# Patient Record
Sex: FEMALE | Employment: OTHER
[De-identification: names, ages, dates, MRNs, and addresses within clinical notes are randomized per-mention and may not be internally consistent; named-entity substitution may affect disease eponyms.]

---

## 2017-01-01 ENCOUNTER — HOME CARE VISIT (OUTPATIENT)
Dept: SCHEDULING | Facility: HOME HEALTH | Age: 77
End: 2017-01-01
Payer: MEDICARE

## 2017-01-01 ENCOUNTER — HOSPITAL ENCOUNTER (EMERGENCY)
Age: 77
Discharge: HOME | DRG: 584 | End: 2017-01-16
Attending: EMERGENCY MEDICINE
Payer: MEDICARE

## 2017-01-01 ENCOUNTER — APPOINTMENT (OUTPATIENT)
Dept: CT IMAGING | Age: 77
DRG: 584 | End: 2017-01-01
Attending: EMERGENCY MEDICINE
Payer: MEDICARE

## 2017-01-01 ENCOUNTER — HOME CARE VISIT (OUTPATIENT)
Dept: HOSPICE | Facility: HOSPICE | Age: 77
End: 2017-01-01
Payer: MEDICARE

## 2017-01-01 ENCOUNTER — APPOINTMENT (OUTPATIENT)
Dept: CT IMAGING | Age: 77
DRG: 584 | End: 2017-01-01
Attending: INTERNAL MEDICINE
Payer: MEDICARE

## 2017-01-01 ENCOUNTER — APPOINTMENT (OUTPATIENT)
Dept: CT IMAGING | Age: 77
DRG: 584 | End: 2017-01-01
Attending: ORTHOPAEDIC SURGERY
Payer: MEDICARE

## 2017-01-01 ENCOUNTER — HOSPICE ADMISSION (OUTPATIENT)
Dept: HOSPICE | Facility: HOSPICE | Age: 77
End: 2017-01-01
Payer: MEDICARE

## 2017-01-01 ENCOUNTER — HOSPITAL ENCOUNTER (INPATIENT)
Age: 77
LOS: 15 days | Discharge: HOSPICE | DRG: 584 | End: 2017-02-01
Attending: EMERGENCY MEDICINE | Admitting: INTERNAL MEDICINE
Payer: MEDICARE

## 2017-01-01 ENCOUNTER — APPOINTMENT (OUTPATIENT)
Dept: NUCLEAR MEDICINE | Age: 77
DRG: 584 | End: 2017-01-01
Attending: ORTHOPAEDIC SURGERY
Payer: MEDICARE

## 2017-01-01 ENCOUNTER — HOSPITAL ENCOUNTER (OUTPATIENT)
Dept: LAB | Age: 77
Discharge: HOME | End: 2017-02-02
Payer: MEDICARE

## 2017-01-01 ENCOUNTER — APPOINTMENT (OUTPATIENT)
Dept: GENERAL RADIOLOGY | Age: 77
DRG: 584 | End: 2017-01-01
Attending: FAMILY MEDICINE
Payer: MEDICARE

## 2017-01-01 ENCOUNTER — APPOINTMENT (OUTPATIENT)
Dept: GENERAL RADIOLOGY | Age: 77
DRG: 871 | End: 2017-01-01
Attending: EMERGENCY MEDICINE
Payer: MEDICARE

## 2017-01-01 ENCOUNTER — HOSPITAL ENCOUNTER (INPATIENT)
Age: 77
LOS: 1 days | DRG: 871 | End: 2017-05-29
Attending: EMERGENCY MEDICINE | Admitting: INTERNAL MEDICINE
Payer: MEDICARE

## 2017-01-01 ENCOUNTER — APPOINTMENT (OUTPATIENT)
Dept: MRI IMAGING | Age: 77
DRG: 584 | End: 2017-01-01
Attending: EMERGENCY MEDICINE
Payer: MEDICARE

## 2017-01-01 ENCOUNTER — HOSPITAL ENCOUNTER (OUTPATIENT)
Dept: LAB | Age: 77
Discharge: HOME | End: 2017-02-02

## 2017-01-01 VITALS
DIASTOLIC BLOOD PRESSURE: 81 MMHG | RESPIRATION RATE: 21 BRPM | TEMPERATURE: 99 F | HEART RATE: 103 BPM | OXYGEN SATURATION: 86 % | SYSTOLIC BLOOD PRESSURE: 148 MMHG

## 2017-01-01 VITALS
OXYGEN SATURATION: 94 % | DIASTOLIC BLOOD PRESSURE: 74 MMHG | RESPIRATION RATE: 27 BRPM | SYSTOLIC BLOOD PRESSURE: 142 MMHG | TEMPERATURE: 98.9 F | HEART RATE: 87 BPM

## 2017-01-01 VITALS
SYSTOLIC BLOOD PRESSURE: 131 MMHG | HEART RATE: 95 BPM | DIASTOLIC BLOOD PRESSURE: 81 MMHG | OXYGEN SATURATION: 96 % | TEMPERATURE: 98.1 F | RESPIRATION RATE: 18 BRPM

## 2017-01-01 VITALS
DIASTOLIC BLOOD PRESSURE: 80 MMHG | OXYGEN SATURATION: 97 % | RESPIRATION RATE: 20 BRPM | SYSTOLIC BLOOD PRESSURE: 145 MMHG | HEART RATE: 96 BPM | TEMPERATURE: 98.1 F

## 2017-01-01 VITALS
OXYGEN SATURATION: 94 % | BODY MASS INDEX: 30.23 KG/M2 | HEIGHT: 63 IN | RESPIRATION RATE: 16 BRPM | SYSTOLIC BLOOD PRESSURE: 90 MMHG | TEMPERATURE: 101.4 F | WEIGHT: 170.64 LBS | DIASTOLIC BLOOD PRESSURE: 42 MMHG | HEART RATE: 120 BPM

## 2017-01-01 VITALS
DIASTOLIC BLOOD PRESSURE: 92 MMHG | OXYGEN SATURATION: 97 % | RESPIRATION RATE: 20 BRPM | HEART RATE: 99 BPM | SYSTOLIC BLOOD PRESSURE: 134 MMHG | TEMPERATURE: 97.3 F

## 2017-01-01 VITALS
SYSTOLIC BLOOD PRESSURE: 118 MMHG | DIASTOLIC BLOOD PRESSURE: 70 MMHG | TEMPERATURE: 98.6 F | RESPIRATION RATE: 18 BRPM | OXYGEN SATURATION: 95 % | HEART RATE: 75 BPM

## 2017-01-01 VITALS
OXYGEN SATURATION: 96 % | DIASTOLIC BLOOD PRESSURE: 84 MMHG | HEART RATE: 98 BPM | RESPIRATION RATE: 28 BRPM | SYSTOLIC BLOOD PRESSURE: 115 MMHG | TEMPERATURE: 97 F

## 2017-01-01 VITALS
TEMPERATURE: 96.1 F | RESPIRATION RATE: 24 BRPM | HEART RATE: 82 BPM | DIASTOLIC BLOOD PRESSURE: 60 MMHG | SYSTOLIC BLOOD PRESSURE: 121 MMHG | OXYGEN SATURATION: 96 %

## 2017-01-01 VITALS
RESPIRATION RATE: 22 BRPM | OXYGEN SATURATION: 96 % | TEMPERATURE: 98.8 F | DIASTOLIC BLOOD PRESSURE: 83 MMHG | HEART RATE: 93 BPM | SYSTOLIC BLOOD PRESSURE: 146 MMHG

## 2017-01-01 VITALS
HEART RATE: 99 BPM | OXYGEN SATURATION: 94 % | TEMPERATURE: 99.3 F | DIASTOLIC BLOOD PRESSURE: 76 MMHG | RESPIRATION RATE: 27 BRPM | SYSTOLIC BLOOD PRESSURE: 146 MMHG

## 2017-01-01 VITALS
RESPIRATION RATE: 20 BRPM | DIASTOLIC BLOOD PRESSURE: 86 MMHG | OXYGEN SATURATION: 98 % | TEMPERATURE: 98 F | SYSTOLIC BLOOD PRESSURE: 139 MMHG | HEART RATE: 88 BPM

## 2017-01-01 VITALS
HEART RATE: 86 BPM | OXYGEN SATURATION: 96 % | DIASTOLIC BLOOD PRESSURE: 85 MMHG | TEMPERATURE: 98.2 F | SYSTOLIC BLOOD PRESSURE: 143 MMHG | RESPIRATION RATE: 20 BRPM

## 2017-01-01 VITALS
DIASTOLIC BLOOD PRESSURE: 83 MMHG | OXYGEN SATURATION: 97 % | TEMPERATURE: 97.8 F | SYSTOLIC BLOOD PRESSURE: 132 MMHG | HEART RATE: 77 BPM | RESPIRATION RATE: 20 BRPM

## 2017-01-01 VITALS
OXYGEN SATURATION: 97 % | TEMPERATURE: 98.3 F | RESPIRATION RATE: 21 BRPM | SYSTOLIC BLOOD PRESSURE: 129 MMHG | HEART RATE: 87 BPM | DIASTOLIC BLOOD PRESSURE: 74 MMHG

## 2017-01-01 VITALS
TEMPERATURE: 98.2 F | OXYGEN SATURATION: 97 % | HEART RATE: 100 BPM | DIASTOLIC BLOOD PRESSURE: 60 MMHG | SYSTOLIC BLOOD PRESSURE: 148 MMHG | RESPIRATION RATE: 24 BRPM

## 2017-01-01 VITALS
TEMPERATURE: 98.3 F | HEART RATE: 90 BPM | OXYGEN SATURATION: 98 % | RESPIRATION RATE: 20 BRPM | DIASTOLIC BLOOD PRESSURE: 77 MMHG | SYSTOLIC BLOOD PRESSURE: 140 MMHG

## 2017-01-01 VITALS
BODY MASS INDEX: 34.15 KG/M2 | SYSTOLIC BLOOD PRESSURE: 137 MMHG | TEMPERATURE: 98.1 F | WEIGHT: 200 LBS | HEART RATE: 102 BPM | RESPIRATION RATE: 20 BRPM | OXYGEN SATURATION: 96 % | DIASTOLIC BLOOD PRESSURE: 69 MMHG | HEIGHT: 64 IN

## 2017-01-01 VITALS
RESPIRATION RATE: 20 BRPM | TEMPERATURE: 98.3 F | DIASTOLIC BLOOD PRESSURE: 72 MMHG | SYSTOLIC BLOOD PRESSURE: 124 MMHG | OXYGEN SATURATION: 97 % | HEART RATE: 89 BPM

## 2017-01-01 VITALS
OXYGEN SATURATION: 97 % | HEART RATE: 93 BPM | SYSTOLIC BLOOD PRESSURE: 143 MMHG | TEMPERATURE: 98.2 F | DIASTOLIC BLOOD PRESSURE: 81 MMHG | RESPIRATION RATE: 23 BRPM

## 2017-01-01 VITALS
RESPIRATION RATE: 27 BRPM | DIASTOLIC BLOOD PRESSURE: 68 MMHG | TEMPERATURE: 98.6 F | HEART RATE: 97 BPM | OXYGEN SATURATION: 96 % | SYSTOLIC BLOOD PRESSURE: 140 MMHG

## 2017-01-01 VITALS
TEMPERATURE: 98 F | BODY MASS INDEX: 33.95 KG/M2 | HEIGHT: 63 IN | SYSTOLIC BLOOD PRESSURE: 146 MMHG | DIASTOLIC BLOOD PRESSURE: 67 MMHG | WEIGHT: 191.58 LBS | OXYGEN SATURATION: 97 % | HEART RATE: 82 BPM | RESPIRATION RATE: 16 BRPM

## 2017-01-01 VITALS
TEMPERATURE: 98.3 F | DIASTOLIC BLOOD PRESSURE: 78 MMHG | OXYGEN SATURATION: 96 % | HEART RATE: 90 BPM | RESPIRATION RATE: 23 BRPM | SYSTOLIC BLOOD PRESSURE: 125 MMHG

## 2017-01-01 VITALS
TEMPERATURE: 98.6 F | RESPIRATION RATE: 26 BRPM | HEART RATE: 89 BPM | SYSTOLIC BLOOD PRESSURE: 122 MMHG | OXYGEN SATURATION: 96 % | DIASTOLIC BLOOD PRESSURE: 78 MMHG

## 2017-01-01 VITALS
SYSTOLIC BLOOD PRESSURE: 143 MMHG | RESPIRATION RATE: 24 BRPM | HEART RATE: 98 BPM | OXYGEN SATURATION: 97 % | DIASTOLIC BLOOD PRESSURE: 87 MMHG | TEMPERATURE: 98.5 F

## 2017-01-01 VITALS
TEMPERATURE: 98.1 F | SYSTOLIC BLOOD PRESSURE: 132 MMHG | OXYGEN SATURATION: 98 % | DIASTOLIC BLOOD PRESSURE: 84 MMHG | RESPIRATION RATE: 20 BRPM | HEART RATE: 94 BPM

## 2017-01-01 VITALS
TEMPERATURE: 96.6 F | OXYGEN SATURATION: 96 % | RESPIRATION RATE: 20 BRPM | DIASTOLIC BLOOD PRESSURE: 83 MMHG | SYSTOLIC BLOOD PRESSURE: 153 MMHG | HEART RATE: 91 BPM

## 2017-01-01 VITALS
HEART RATE: 108 BPM | RESPIRATION RATE: 26 BRPM | SYSTOLIC BLOOD PRESSURE: 143 MMHG | DIASTOLIC BLOOD PRESSURE: 62 MMHG | TEMPERATURE: 98.5 F

## 2017-01-01 VITALS
DIASTOLIC BLOOD PRESSURE: 89 MMHG | TEMPERATURE: 98.8 F | OXYGEN SATURATION: 96 % | RESPIRATION RATE: 26 BRPM | HEART RATE: 83 BPM | SYSTOLIC BLOOD PRESSURE: 139 MMHG

## 2017-01-01 VITALS
DIASTOLIC BLOOD PRESSURE: 77 MMHG | SYSTOLIC BLOOD PRESSURE: 138 MMHG | OXYGEN SATURATION: 96 % | HEART RATE: 91 BPM | TEMPERATURE: 98.7 F | RESPIRATION RATE: 27 BRPM

## 2017-01-01 VITALS
SYSTOLIC BLOOD PRESSURE: 139 MMHG | DIASTOLIC BLOOD PRESSURE: 71 MMHG | OXYGEN SATURATION: 96 % | HEART RATE: 89 BPM | TEMPERATURE: 98.7 F | RESPIRATION RATE: 23 BRPM

## 2017-01-01 VITALS
RESPIRATION RATE: 23 BRPM | OXYGEN SATURATION: 97 % | DIASTOLIC BLOOD PRESSURE: 72 MMHG | TEMPERATURE: 98.5 F | SYSTOLIC BLOOD PRESSURE: 124 MMHG | HEART RATE: 103 BPM

## 2017-01-01 VITALS
OXYGEN SATURATION: 97 % | HEART RATE: 97 BPM | TEMPERATURE: 97.4 F | SYSTOLIC BLOOD PRESSURE: 150 MMHG | RESPIRATION RATE: 26 BRPM | DIASTOLIC BLOOD PRESSURE: 86 MMHG

## 2017-01-01 VITALS
RESPIRATION RATE: 23 BRPM | OXYGEN SATURATION: 97 % | TEMPERATURE: 97.9 F | HEART RATE: 79 BPM | DIASTOLIC BLOOD PRESSURE: 80 MMHG | SYSTOLIC BLOOD PRESSURE: 153 MMHG

## 2017-01-01 DIAGNOSIS — S16.1XXA NECK STRAIN, INITIAL ENCOUNTER: Primary | ICD-10-CM

## 2017-01-01 DIAGNOSIS — I26.99 OTHER ACUTE PULMONARY EMBOLISM WITHOUT ACUTE COR PULMONALE (HCC): ICD-10-CM

## 2017-01-01 DIAGNOSIS — R51.9 HEADACHE, UNSPECIFIED HEADACHE TYPE: ICD-10-CM

## 2017-01-01 DIAGNOSIS — R53.81 DEBILITY: ICD-10-CM

## 2017-01-01 DIAGNOSIS — E86.0 DEHYDRATION: ICD-10-CM

## 2017-01-01 DIAGNOSIS — C79.51 BONE METASTASIS (HCC): ICD-10-CM

## 2017-01-01 DIAGNOSIS — C50.412 MALIGNANT NEOPLASM OF UPPER-OUTER QUADRANT OF LEFT FEMALE BREAST (HCC): ICD-10-CM

## 2017-01-01 DIAGNOSIS — G95.29 SPINAL CORD COMPRESSION DUE TO MALIGNANT NEOPLASM METASTATIC TO SPINE (HCC): ICD-10-CM

## 2017-01-01 DIAGNOSIS — R22.1 NECK MASS: ICD-10-CM

## 2017-01-01 DIAGNOSIS — C78.00 BREAST CANCER METASTASIZED TO LUNG, RIGHT (HCC): ICD-10-CM

## 2017-01-01 DIAGNOSIS — C79.51 SPINAL CORD COMPRESSION DUE TO MALIGNANT NEOPLASM METASTATIC TO SPINE (HCC): ICD-10-CM

## 2017-01-01 DIAGNOSIS — A41.9 SEPSIS AFFECTING SKIN: Primary | ICD-10-CM

## 2017-01-01 DIAGNOSIS — M54.2 NECK PAIN: Primary | ICD-10-CM

## 2017-01-01 DIAGNOSIS — C50.911 BREAST CANCER METASTASIZED TO LUNG, RIGHT (HCC): ICD-10-CM

## 2017-01-01 LAB
ABO + RH BLD: NORMAL
ABO + RH BLD: NORMAL
ALBUMIN SERPL BCP-MCNC: 1.9 G/DL (ref 3.4–5)
ALBUMIN SERPL BCP-MCNC: 1.9 G/DL (ref 3.4–5)
ALBUMIN SERPL BCP-MCNC: 2.1 G/DL (ref 3.4–5)
ALBUMIN SERPL BCP-MCNC: 2.9 G/DL (ref 3.4–5)
ALBUMIN/GLOB SERPL: 0.4 {RATIO} (ref 0.8–1.7)
ALBUMIN/GLOB SERPL: 0.5 {RATIO} (ref 0.8–1.7)
ALP SERPL-CCNC: 173 U/L (ref 45–117)
ALP SERPL-CCNC: 47 U/L (ref 45–117)
ALP SERPL-CCNC: 53 U/L (ref 45–117)
ALP SERPL-CCNC: 66 U/L (ref 45–117)
ALT SERPL-CCNC: 22 U/L (ref 13–56)
ALT SERPL-CCNC: 36 U/L (ref 13–56)
ALT SERPL-CCNC: 38 U/L (ref 13–56)
ALT SERPL-CCNC: 73 U/L (ref 13–56)
ANION GAP BLD CALC-SCNC: 5 MMOL/L (ref 3–18)
ANION GAP BLD CALC-SCNC: 6 MMOL/L (ref 3–18)
ANION GAP BLD CALC-SCNC: 7 MMOL/L (ref 3–18)
ANION GAP BLD CALC-SCNC: 8 MMOL/L (ref 3–18)
ANION GAP BLD CALC-SCNC: 9 MMOL/L (ref 3–18)
APPEARANCE UR: ABNORMAL
APPEARANCE UR: CLEAR
APTT PPP: 110.6 SEC (ref 23–36.4)
APTT PPP: 150.4 SEC (ref 23–36.4)
APTT PPP: 25.3 SEC (ref 23–36.4)
APTT PPP: 26.2 SEC (ref 23–36.4)
APTT PPP: 26.4 SEC (ref 23–36.4)
APTT PPP: 26.4 SEC (ref 23–36.4)
APTT PPP: 26.6 SEC (ref 23–36.4)
APTT PPP: 31.1 SEC (ref 23–36.4)
APTT PPP: 32.3 SEC (ref 23–36.4)
APTT PPP: 35.9 SEC (ref 23–36.4)
APTT PPP: 42 SEC (ref 23–36.4)
APTT PPP: 48.1 SEC (ref 23–36.4)
APTT PPP: 48.8 SEC (ref 23–36.4)
APTT PPP: 72.2 SEC (ref 23–36.4)
APTT PPP: 73.9 SEC (ref 23–36.4)
APTT PPP: 74.5 SEC (ref 23–36.4)
APTT PPP: 75 SEC (ref 23–36.4)
APTT PPP: 76.2 SEC (ref 23–36.4)
APTT PPP: 95.2 SEC (ref 23–36.4)
APTT PPP: >180 SEC (ref 23–36.4)
ARTERIAL PATENCY WRIST A: ABNORMAL
ARTERIAL PATENCY WRIST A: YES
AST SERPL W P-5'-P-CCNC: 108 U/L (ref 15–37)
AST SERPL W P-5'-P-CCNC: 35 U/L (ref 15–37)
AST SERPL W P-5'-P-CCNC: 45 U/L (ref 15–37)
AST SERPL W P-5'-P-CCNC: 58 U/L (ref 15–37)
ATRIAL RATE: 103 BPM
ATRIAL RATE: 113 BPM
ATRIAL RATE: 131 BPM
BACTERIA SPEC CULT: NORMAL
BACTERIA URNS QL MICRO: ABNORMAL /HPF
BACTERIA URNS QL MICRO: ABNORMAL /HPF
BASE EXCESS BLD CALC-SCNC: 11 MMOL/L
BASE EXCESS BLD CALC-SCNC: 5 MMOL/L
BASOPHILS # BLD AUTO: 0 K/UL (ref 0–0.06)
BASOPHILS # BLD AUTO: 0 K/UL (ref 0–0.1)
BASOPHILS # BLD: 0 % (ref 0–2)
BASOPHILS # BLD: 0 % (ref 0–3)
BASOPHILS # BLD: 0 % (ref 0–3)
BDY SITE: ABNORMAL
BDY SITE: ABNORMAL
BILIRUB DIRECT SERPL-MCNC: <0.1 MG/DL (ref 0–0.2)
BILIRUB SERPL-MCNC: 0.1 MG/DL (ref 0.2–1)
BILIRUB SERPL-MCNC: 0.5 MG/DL (ref 0.2–1)
BILIRUB SERPL-MCNC: 0.6 MG/DL (ref 0.2–1)
BILIRUB SERPL-MCNC: 1.2 MG/DL (ref 0.2–1)
BILIRUB UR QL: ABNORMAL
BILIRUB UR QL: NEGATIVE
BLD PROD TYP BPU: NORMAL
BLOOD GROUP ANTIBODIES SERPL: NORMAL
BLOOD GROUP ANTIBODIES SERPL: NORMAL
BODY TEMPERATURE: 37
BPU ID: NORMAL
BUN SERPL-MCNC: 13 MG/DL (ref 7–18)
BUN SERPL-MCNC: 16 MG/DL (ref 7–18)
BUN SERPL-MCNC: 16 MG/DL (ref 7–18)
BUN SERPL-MCNC: 20 MG/DL (ref 7–18)
BUN SERPL-MCNC: 21 MG/DL (ref 7–18)
BUN SERPL-MCNC: 21 MG/DL (ref 7–18)
BUN SERPL-MCNC: 22 MG/DL (ref 7–18)
BUN SERPL-MCNC: 23 MG/DL (ref 7–18)
BUN SERPL-MCNC: 24 MG/DL (ref 7–18)
BUN SERPL-MCNC: 26 MG/DL (ref 7–18)
BUN SERPL-MCNC: 71 MG/DL (ref 7–18)
BUN/CREAT SERPL: 19 (ref 12–20)
BUN/CREAT SERPL: 20 (ref 12–20)
BUN/CREAT SERPL: 24 (ref 12–20)
BUN/CREAT SERPL: 26 (ref 12–20)
BUN/CREAT SERPL: 26 (ref 12–20)
BUN/CREAT SERPL: 27 (ref 12–20)
BUN/CREAT SERPL: 28 (ref 12–20)
BUN/CREAT SERPL: 31 (ref 12–20)
BUN/CREAT SERPL: 32 (ref 12–20)
BUN/CREAT SERPL: 32 (ref 12–20)
BUN/CREAT SERPL: 49 (ref 12–20)
CALCIUM SERPL-MCNC: 10.4 MG/DL (ref 8.5–10.1)
CALCIUM SERPL-MCNC: 10.4 MG/DL (ref 8.5–10.1)
CALCIUM SERPL-MCNC: 8.6 MG/DL (ref 8.5–10.1)
CALCIUM SERPL-MCNC: 8.8 MG/DL (ref 8.5–10.1)
CALCIUM SERPL-MCNC: 8.9 MG/DL (ref 8.5–10.1)
CALCIUM SERPL-MCNC: 9 MG/DL (ref 8.5–10.1)
CALCIUM SERPL-MCNC: 9 MG/DL (ref 8.5–10.1)
CALCIUM SERPL-MCNC: 9.2 MG/DL (ref 8.5–10.1)
CALCIUM SERPL-MCNC: 9.3 MG/DL (ref 8.5–10.1)
CALCIUM SERPL-MCNC: 9.4 MG/DL (ref 8.5–10.1)
CALCIUM SERPL-MCNC: 9.5 MG/DL (ref 8.5–10.1)
CALCULATED P AXIS, ECG09: 28 DEGREES
CALCULATED P AXIS, ECG09: 50 DEGREES
CALCULATED P AXIS, ECG09: 61 DEGREES
CALCULATED R AXIS, ECG10: -27 DEGREES
CALCULATED R AXIS, ECG10: -41 DEGREES
CALCULATED R AXIS, ECG10: -42 DEGREES
CALCULATED T AXIS, ECG11: 115 DEGREES
CALCULATED T AXIS, ECG11: 118 DEGREES
CALCULATED T AXIS, ECG11: 138 DEGREES
CALLED TO:,BCALL1: NORMAL
CANCER AG27-29 SERPL-ACNC: 49.4 U/ML (ref 0–38.6)
CEA SERPL-MCNC: 19.5 NG/ML
CHLORIDE SERPL-SCNC: 100 MMOL/L (ref 100–108)
CHLORIDE SERPL-SCNC: 100 MMOL/L (ref 100–108)
CHLORIDE SERPL-SCNC: 101 MMOL/L (ref 100–108)
CHLORIDE SERPL-SCNC: 102 MMOL/L (ref 100–108)
CHLORIDE SERPL-SCNC: 103 MMOL/L (ref 100–108)
CHLORIDE SERPL-SCNC: 104 MMOL/L (ref 100–108)
CHLORIDE SERPL-SCNC: 97 MMOL/L (ref 100–108)
CHLORIDE SERPL-SCNC: 99 MMOL/L (ref 100–108)
CHLORIDE SERPL-SCNC: 99 MMOL/L (ref 100–108)
CK MB CFR SERPL CALC: 2 % (ref 0–4)
CK MB CFR SERPL CALC: 3.2 % (ref 0–4)
CK MB CFR SERPL CALC: 3.4 % (ref 0–4)
CK MB CFR SERPL CALC: 3.9 % (ref 0–4)
CK MB CFR SERPL CALC: 4 % (ref 0–4)
CK MB SERPL-MCNC: 1 NG/ML (ref 0.5–3.6)
CK MB SERPL-MCNC: 1.1 NG/ML (ref 0.5–3.6)
CK MB SERPL-MCNC: 1.3 NG/ML (ref 0.5–3.6)
CK MB SERPL-MCNC: 1.9 NG/ML (ref 0.5–3.6)
CK MB SERPL-MCNC: 2.9 NG/ML (ref 0.5–3.6)
CK SERPL-CCNC: 143 U/L (ref 26–192)
CK SERPL-CCNC: 28 U/L (ref 26–192)
CK SERPL-CCNC: 29 U/L (ref 26–192)
CK SERPL-CCNC: 41 U/L (ref 26–192)
CK SERPL-CCNC: 48 U/L (ref 26–192)
CO2 SERPL-SCNC: 27 MMOL/L (ref 21–32)
CO2 SERPL-SCNC: 27 MMOL/L (ref 21–32)
CO2 SERPL-SCNC: 28 MMOL/L (ref 21–32)
CO2 SERPL-SCNC: 30 MMOL/L (ref 21–32)
CO2 SERPL-SCNC: 30 MMOL/L (ref 21–32)
CO2 SERPL-SCNC: 31 MMOL/L (ref 21–32)
CO2 SERPL-SCNC: 31 MMOL/L (ref 21–32)
CO2 SERPL-SCNC: 32 MMOL/L (ref 21–32)
CO2 SERPL-SCNC: 32 MMOL/L (ref 21–32)
COLOR UR: ABNORMAL
COLOR UR: YELLOW
CREAT SERPL-MCNC: 0.66 MG/DL (ref 0.6–1.3)
CREAT SERPL-MCNC: 0.67 MG/DL (ref 0.6–1.3)
CREAT SERPL-MCNC: 0.68 MG/DL (ref 0.6–1.3)
CREAT SERPL-MCNC: 0.72 MG/DL (ref 0.6–1.3)
CREAT SERPL-MCNC: 0.73 MG/DL (ref 0.6–1.3)
CREAT SERPL-MCNC: 0.74 MG/DL (ref 0.6–1.3)
CREAT SERPL-MCNC: 0.79 MG/DL (ref 0.6–1.3)
CREAT SERPL-MCNC: 0.8 MG/DL (ref 0.6–1.3)
CREAT SERPL-MCNC: 0.84 MG/DL (ref 0.6–1.3)
CREAT SERPL-MCNC: 0.95 MG/DL (ref 0.6–1.3)
CREAT SERPL-MCNC: 1.44 MG/DL (ref 0.6–1.3)
CROSSMATCH RESULT,%XM: NORMAL
CROSSMATCH RESULT,%XM: NORMAL
DATE LAST DOSE: ABNORMAL
DATE LAST DOSE: ABNORMAL
DIAGNOSIS, 93000: NORMAL
DIFFERENTIAL METHOD BLD: ABNORMAL
EOSINOPHIL # BLD: 0 K/UL (ref 0–0.4)
EOSINOPHIL NFR BLD: 0 % (ref 0–5)
EPITH CASTS URNS QL MICRO: ABNORMAL /LPF (ref 0–5)
EPITH CASTS URNS QL MICRO: NEGATIVE /LPF (ref 0–5)
ERYTHROCYTE [DISTWIDTH] IN BLOOD BY AUTOMATED COUNT: 13.4 % (ref 11.6–14.5)
ERYTHROCYTE [DISTWIDTH] IN BLOOD BY AUTOMATED COUNT: 13.4 % (ref 11.6–14.5)
ERYTHROCYTE [DISTWIDTH] IN BLOOD BY AUTOMATED COUNT: 13.5 % (ref 11.6–14.5)
ERYTHROCYTE [DISTWIDTH] IN BLOOD BY AUTOMATED COUNT: 13.6 % (ref 11.6–14.5)
ERYTHROCYTE [DISTWIDTH] IN BLOOD BY AUTOMATED COUNT: 13.7 % (ref 11.6–14.5)
ERYTHROCYTE [DISTWIDTH] IN BLOOD BY AUTOMATED COUNT: 13.7 % (ref 11.6–14.5)
ERYTHROCYTE [DISTWIDTH] IN BLOOD BY AUTOMATED COUNT: 13.8 % (ref 11.6–14.5)
ERYTHROCYTE [DISTWIDTH] IN BLOOD BY AUTOMATED COUNT: 15 % (ref 11.6–14.5)
ERYTHROCYTE [DISTWIDTH] IN BLOOD BY AUTOMATED COUNT: 15.7 % (ref 11.6–14.5)
ERYTHROCYTE [DISTWIDTH] IN BLOOD BY AUTOMATED COUNT: 16.2 % (ref 11.6–14.5)
ERYTHROCYTE [DISTWIDTH] IN BLOOD BY AUTOMATED COUNT: 17.6 % (ref 11.6–14.5)
GAS FLOW.O2 O2 DELIVERY SYS: ABNORMAL L/MIN
GAS FLOW.O2 O2 DELIVERY SYS: ABNORMAL L/MIN
GAS FLOW.O2 SETTING OXYMISER: 3 L/M
GLOBULIN SER CALC-MCNC: 3.5 G/DL (ref 2–4)
GLOBULIN SER CALC-MCNC: 3.9 G/DL (ref 2–4)
GLOBULIN SER CALC-MCNC: 4.4 G/DL (ref 2–4)
GLOBULIN SER CALC-MCNC: 5.8 G/DL (ref 2–4)
GLUCOSE BLD STRIP.AUTO-MCNC: 103 MG/DL (ref 70–110)
GLUCOSE BLD STRIP.AUTO-MCNC: 119 MG/DL (ref 70–110)
GLUCOSE BLD STRIP.AUTO-MCNC: 119 MG/DL (ref 70–110)
GLUCOSE BLD STRIP.AUTO-MCNC: 120 MG/DL (ref 70–110)
GLUCOSE BLD STRIP.AUTO-MCNC: 120 MG/DL (ref 70–110)
GLUCOSE BLD STRIP.AUTO-MCNC: 122 MG/DL (ref 70–110)
GLUCOSE BLD STRIP.AUTO-MCNC: 123 MG/DL (ref 70–110)
GLUCOSE BLD STRIP.AUTO-MCNC: 125 MG/DL (ref 70–110)
GLUCOSE BLD STRIP.AUTO-MCNC: 128 MG/DL (ref 70–110)
GLUCOSE BLD STRIP.AUTO-MCNC: 130 MG/DL (ref 70–110)
GLUCOSE BLD STRIP.AUTO-MCNC: 133 MG/DL (ref 70–110)
GLUCOSE BLD STRIP.AUTO-MCNC: 135 MG/DL (ref 70–110)
GLUCOSE BLD STRIP.AUTO-MCNC: 136 MG/DL (ref 70–110)
GLUCOSE BLD STRIP.AUTO-MCNC: 143 MG/DL (ref 70–110)
GLUCOSE BLD STRIP.AUTO-MCNC: 145 MG/DL (ref 70–110)
GLUCOSE BLD STRIP.AUTO-MCNC: 147 MG/DL (ref 70–110)
GLUCOSE BLD STRIP.AUTO-MCNC: 147 MG/DL (ref 70–110)
GLUCOSE BLD STRIP.AUTO-MCNC: 150 MG/DL (ref 70–110)
GLUCOSE BLD STRIP.AUTO-MCNC: 154 MG/DL (ref 70–110)
GLUCOSE BLD STRIP.AUTO-MCNC: 157 MG/DL (ref 70–110)
GLUCOSE BLD STRIP.AUTO-MCNC: 158 MG/DL (ref 70–110)
GLUCOSE BLD STRIP.AUTO-MCNC: 163 MG/DL (ref 70–110)
GLUCOSE BLD STRIP.AUTO-MCNC: 167 MG/DL (ref 70–110)
GLUCOSE BLD STRIP.AUTO-MCNC: 168 MG/DL (ref 70–110)
GLUCOSE BLD STRIP.AUTO-MCNC: 169 MG/DL (ref 70–110)
GLUCOSE BLD STRIP.AUTO-MCNC: 172 MG/DL (ref 70–110)
GLUCOSE BLD STRIP.AUTO-MCNC: 175 MG/DL (ref 70–110)
GLUCOSE BLD STRIP.AUTO-MCNC: 176 MG/DL (ref 70–110)
GLUCOSE BLD STRIP.AUTO-MCNC: 194 MG/DL (ref 70–110)
GLUCOSE BLD STRIP.AUTO-MCNC: 196 MG/DL (ref 70–110)
GLUCOSE BLD STRIP.AUTO-MCNC: 197 MG/DL (ref 70–110)
GLUCOSE BLD STRIP.AUTO-MCNC: 201 MG/DL (ref 70–110)
GLUCOSE BLD STRIP.AUTO-MCNC: 99 MG/DL (ref 70–110)
GLUCOSE SERPL-MCNC: 108 MG/DL (ref 74–99)
GLUCOSE SERPL-MCNC: 114 MG/DL (ref 74–99)
GLUCOSE SERPL-MCNC: 116 MG/DL (ref 74–99)
GLUCOSE SERPL-MCNC: 119 MG/DL (ref 74–99)
GLUCOSE SERPL-MCNC: 135 MG/DL (ref 74–99)
GLUCOSE SERPL-MCNC: 139 MG/DL (ref 74–99)
GLUCOSE SERPL-MCNC: 142 MG/DL (ref 74–99)
GLUCOSE SERPL-MCNC: 144 MG/DL (ref 74–99)
GLUCOSE SERPL-MCNC: 151 MG/DL (ref 74–99)
GLUCOSE SERPL-MCNC: 169 MG/DL (ref 74–99)
GLUCOSE SERPL-MCNC: 87 MG/DL (ref 74–99)
GLUCOSE UR STRIP.AUTO-MCNC: NEGATIVE MG/DL
GLUCOSE UR STRIP.AUTO-MCNC: NEGATIVE MG/DL
HCO3 BLD-SCNC: 30.3 MMOL/L (ref 22–26)
HCO3 BLD-SCNC: 34.1 MMOL/L (ref 22–26)
HCT VFR BLD AUTO: 26.2 % (ref 35–45)
HCT VFR BLD AUTO: 26.4 % (ref 35–45)
HCT VFR BLD AUTO: 26.9 % (ref 35–45)
HCT VFR BLD AUTO: 27.2 % (ref 35–45)
HCT VFR BLD AUTO: 27.3 % (ref 35–45)
HCT VFR BLD AUTO: 27.5 % (ref 35–45)
HCT VFR BLD AUTO: 28.3 % (ref 35–45)
HCT VFR BLD AUTO: 29.6 % (ref 35–45)
HCT VFR BLD AUTO: 29.9 % (ref 35–45)
HCT VFR BLD AUTO: 30 % (ref 35–45)
HCT VFR BLD AUTO: 30.2 % (ref 35–45)
HCT VFR BLD AUTO: 30.7 % (ref 35–45)
HCT VFR BLD AUTO: 31.1 % (ref 35–45)
HCT VFR BLD AUTO: 31.7 % (ref 35–45)
HCT VFR BLD AUTO: 31.9 % (ref 35–45)
HCT VFR BLD AUTO: 32.2 % (ref 35–45)
HCT VFR BLD AUTO: 32.3 % (ref 35–45)
HCT VFR BLD AUTO: 32.6 % (ref 35–45)
HCT VFR BLD AUTO: 32.6 % (ref 35–45)
HCT VFR BLD AUTO: 32.9 % (ref 35–45)
HCT VFR BLD AUTO: 33.4 % (ref 35–45)
HCT VFR BLD AUTO: 34.6 % (ref 35–45)
HCT VFR BLD AUTO: 35.8 % (ref 35–45)
HCT VFR BLD AUTO: 44.4 % (ref 35–45)
HGB BLD-MCNC: 10.2 G/DL (ref 12–16)
HGB BLD-MCNC: 10.3 G/DL (ref 12–16)
HGB BLD-MCNC: 10.4 G/DL (ref 12–16)
HGB BLD-MCNC: 10.6 G/DL (ref 12–16)
HGB BLD-MCNC: 10.7 G/DL (ref 12–16)
HGB BLD-MCNC: 10.7 G/DL (ref 12–16)
HGB BLD-MCNC: 10.8 G/DL (ref 12–16)
HGB BLD-MCNC: 10.8 G/DL (ref 12–16)
HGB BLD-MCNC: 11.1 G/DL (ref 12–16)
HGB BLD-MCNC: 11.1 G/DL (ref 12–16)
HGB BLD-MCNC: 11.6 G/DL (ref 12–16)
HGB BLD-MCNC: 11.9 G/DL (ref 12–16)
HGB BLD-MCNC: 14 G/DL (ref 12–16)
HGB BLD-MCNC: 8.6 G/DL (ref 12–16)
HGB BLD-MCNC: 8.7 G/DL (ref 12–16)
HGB BLD-MCNC: 8.8 G/DL (ref 12–16)
HGB BLD-MCNC: 8.9 G/DL (ref 12–16)
HGB BLD-MCNC: 8.9 G/DL (ref 12–16)
HGB BLD-MCNC: 9 G/DL (ref 12–16)
HGB BLD-MCNC: 9.3 G/DL (ref 12–16)
HGB BLD-MCNC: 9.7 G/DL (ref 12–16)
HGB BLD-MCNC: 9.8 G/DL (ref 12–16)
HGB BLD-MCNC: 9.9 G/DL (ref 12–16)
HGB BLD-MCNC: 9.9 G/DL (ref 12–16)
HGB UR QL STRIP: ABNORMAL
HGB UR QL STRIP: NEGATIVE
HYALINE CASTS URNS QL MICRO: ABNORMAL /LPF (ref 0–2)
INR PPP: 1 (ref 0.8–1.2)
INR PPP: 1.1 (ref 0.8–1.2)
INR PPP: 1.2 (ref 0.8–1.2)
INR PPP: 1.4 (ref 0.8–1.2)
INR PPP: 1.9 (ref 0.8–1.2)
INR PPP: 2.3 (ref 0.8–1.2)
INR PPP: 2.4 (ref 0.8–1.2)
INR PPP: 4.1 (ref 0.8–1.2)
KETONES UR QL STRIP.AUTO: NEGATIVE MG/DL
KETONES UR QL STRIP.AUTO: NEGATIVE MG/DL
LACTATE SERPL-SCNC: 2.3 MMOL/L (ref 0.4–2)
LACTATE SERPL-SCNC: 3.2 MMOL/L (ref 0.4–2)
LEUKOCYTE ESTERASE UR QL STRIP.AUTO: ABNORMAL
LEUKOCYTE ESTERASE UR QL STRIP.AUTO: NEGATIVE
LYMPHOCYTES # BLD AUTO: 10 % (ref 21–52)
LYMPHOCYTES # BLD AUTO: 14 % (ref 20–51)
LYMPHOCYTES # BLD AUTO: 14 % (ref 21–52)
LYMPHOCYTES # BLD AUTO: 3 % (ref 20–51)
LYMPHOCYTES # BLD AUTO: 6 % (ref 21–52)
LYMPHOCYTES # BLD AUTO: 8 % (ref 21–52)
LYMPHOCYTES # BLD AUTO: 9 % (ref 21–52)
LYMPHOCYTES # BLD: 0.5 K/UL (ref 0.8–3.5)
LYMPHOCYTES # BLD: 0.7 K/UL (ref 0.9–3.6)
LYMPHOCYTES # BLD: 0.8 K/UL (ref 0.9–3.6)
LYMPHOCYTES # BLD: 0.9 K/UL (ref 0.9–3.6)
LYMPHOCYTES # BLD: 1.1 K/UL (ref 0.9–3.6)
LYMPHOCYTES # BLD: 1.1 K/UL (ref 0.9–3.6)
LYMPHOCYTES # BLD: 1.6 K/UL (ref 0.9–3.6)
LYMPHOCYTES # BLD: 1.8 K/UL (ref 0.8–3.5)
MAGNESIUM SERPL-MCNC: 1.9 MG/DL (ref 1.8–2.4)
MAGNESIUM SERPL-MCNC: 1.9 MG/DL (ref 1.8–2.4)
MAGNESIUM SERPL-MCNC: 2 MG/DL (ref 1.8–2.4)
MAGNESIUM SERPL-MCNC: 2.1 MG/DL (ref 1.8–2.4)
MAGNESIUM SERPL-MCNC: 2.2 MG/DL (ref 1.8–2.4)
MCH RBC QN AUTO: 26.7 PG (ref 24–34)
MCH RBC QN AUTO: 26.8 PG (ref 24–34)
MCH RBC QN AUTO: 26.8 PG (ref 24–34)
MCH RBC QN AUTO: 26.9 PG (ref 24–34)
MCH RBC QN AUTO: 26.9 PG (ref 24–34)
MCH RBC QN AUTO: 27.1 PG (ref 24–34)
MCH RBC QN AUTO: 27.1 PG (ref 24–34)
MCH RBC QN AUTO: 27.5 PG (ref 24–34)
MCH RBC QN AUTO: 27.7 PG (ref 24–34)
MCH RBC QN AUTO: 27.9 PG (ref 24–34)
MCH RBC QN AUTO: 29.8 PG (ref 24–34)
MCHC RBC AUTO-ENTMCNC: 31.5 G/DL (ref 31–37)
MCHC RBC AUTO-ENTMCNC: 32.6 G/DL (ref 31–37)
MCHC RBC AUTO-ENTMCNC: 32.7 G/DL (ref 31–37)
MCHC RBC AUTO-ENTMCNC: 32.8 G/DL (ref 31–37)
MCHC RBC AUTO-ENTMCNC: 32.9 G/DL (ref 31–37)
MCHC RBC AUTO-ENTMCNC: 33 G/DL (ref 31–37)
MCHC RBC AUTO-ENTMCNC: 33.1 G/DL (ref 31–37)
MCHC RBC AUTO-ENTMCNC: 33.2 G/DL (ref 31–37)
MCHC RBC AUTO-ENTMCNC: 33.7 G/DL (ref 31–37)
MCV RBC AUTO: 81.4 FL (ref 74–97)
MCV RBC AUTO: 81.5 FL (ref 74–97)
MCV RBC AUTO: 81.8 FL (ref 74–97)
MCV RBC AUTO: 82 FL (ref 74–97)
MCV RBC AUTO: 82 FL (ref 74–97)
MCV RBC AUTO: 82.2 FL (ref 74–97)
MCV RBC AUTO: 82.5 FL (ref 74–97)
MCV RBC AUTO: 83 FL (ref 74–97)
MCV RBC AUTO: 83 FL (ref 74–97)
MCV RBC AUTO: 83.9 FL (ref 74–97)
MCV RBC AUTO: 94.5 FL (ref 74–97)
METAMYELOCYTES NFR BLD MANUAL: 5 %
MONOCYTES # BLD: 0.4 K/UL (ref 0–1)
MONOCYTES # BLD: 0.6 K/UL (ref 0.05–1.2)
MONOCYTES # BLD: 0.8 K/UL (ref 0.05–1.2)
MONOCYTES # BLD: 0.9 K/UL (ref 0.05–1.2)
MONOCYTES # BLD: 0.9 K/UL (ref 0.05–1.2)
MONOCYTES # BLD: 1.3 K/UL (ref 0.05–1.2)
MONOCYTES # BLD: 1.3 K/UL (ref 0.05–1.2)
MONOCYTES # BLD: 1.5 K/UL (ref 0.05–1.2)
MONOCYTES # BLD: 1.5 K/UL (ref 0.05–1.2)
MONOCYTES # BLD: 1.9 K/UL (ref 0–1)
MONOCYTES NFR BLD AUTO: 10 % (ref 3–10)
MONOCYTES NFR BLD AUTO: 11 % (ref 3–10)
MONOCYTES NFR BLD AUTO: 12 % (ref 3–10)
MONOCYTES NFR BLD AUTO: 12 % (ref 3–10)
MONOCYTES NFR BLD AUTO: 15 % (ref 2–9)
MONOCYTES NFR BLD AUTO: 2 % (ref 2–9)
MONOCYTES NFR BLD AUTO: 5 % (ref 3–10)
MONOCYTES NFR BLD AUTO: 6 % (ref 3–10)
MONOCYTES NFR BLD AUTO: 8 % (ref 3–10)
MONOCYTES NFR BLD AUTO: 8 % (ref 3–10)
MUCOUS THREADS URNS QL MICRO: ABNORMAL /LPF
MYELOCYTES NFR BLD MANUAL: 1 %
NEUTS BAND NFR BLD MANUAL: 2 % (ref 0–5)
NEUTS SEG # BLD: 10.4 K/UL (ref 1.8–8)
NEUTS SEG # BLD: 10.6 K/UL (ref 1.8–8)
NEUTS SEG # BLD: 11.6 K/UL (ref 1.8–8)
NEUTS SEG # BLD: 12.6 K/UL (ref 1.8–8)
NEUTS SEG # BLD: 15.8 K/UL (ref 1.8–8)
NEUTS SEG # BLD: 8.7 K/UL (ref 1.8–8)
NEUTS SEG # BLD: 9.1 K/UL (ref 1.8–8)
NEUTS SEG # BLD: 9.4 K/UL (ref 1.8–8)
NEUTS SEG # BLD: 9.6 K/UL (ref 1.8–8)
NEUTS SEG # BLD: 9.7 K/UL (ref 1.8–8)
NEUTS SEG NFR BLD AUTO: 71 % (ref 42–75)
NEUTS SEG NFR BLD AUTO: 74 % (ref 40–73)
NEUTS SEG NFR BLD AUTO: 80 % (ref 40–73)
NEUTS SEG NFR BLD AUTO: 82 % (ref 40–73)
NEUTS SEG NFR BLD AUTO: 82 % (ref 40–73)
NEUTS SEG NFR BLD AUTO: 84 % (ref 40–73)
NEUTS SEG NFR BLD AUTO: 86 % (ref 40–73)
NEUTS SEG NFR BLD AUTO: 86 % (ref 40–73)
NEUTS SEG NFR BLD AUTO: 87 % (ref 42–75)
NEUTS SEG NFR BLD AUTO: 89 % (ref 40–73)
NITRITE UR QL STRIP.AUTO: NEGATIVE
NITRITE UR QL STRIP.AUTO: NEGATIVE
NRBC BLD-RTO: 1 PER 100 WBC
O2/TOTAL GAS SETTING VFR VENT: 36 %
O2/TOTAL GAS SETTING VFR VENT: 45 %
P-R INTERVAL, ECG05: 150 MS
P-R INTERVAL, ECG05: 158 MS
P-R INTERVAL, ECG05: 160 MS
PCO2 BLD: 46 MMHG (ref 35–45)
PCO2 BLD: 49.2 MMHG (ref 35–45)
PEEP RESPIRATORY: 5 CMH2O
PH BLD: 7.4 [PH] (ref 7.35–7.45)
PH BLD: 7.48 [PH] (ref 7.35–7.45)
PH UR STRIP: 5 [PH] (ref 5–8)
PH UR STRIP: 5.5 [PH] (ref 5–8)
PIP ISTAT,IPIP: 10
PLATELET # BLD AUTO: 148 K/UL (ref 135–420)
PLATELET # BLD AUTO: 320 K/UL (ref 135–420)
PLATELET # BLD AUTO: 361 K/UL (ref 135–420)
PLATELET # BLD AUTO: 380 K/UL (ref 135–420)
PLATELET # BLD AUTO: 388 K/UL (ref 135–420)
PLATELET # BLD AUTO: 392 K/UL (ref 135–420)
PLATELET # BLD AUTO: 396 K/UL (ref 135–420)
PLATELET # BLD AUTO: 404 K/UL (ref 135–420)
PLATELET # BLD AUTO: 410 K/UL (ref 135–420)
PLATELET # BLD AUTO: 416 K/UL (ref 135–420)
PLATELET # BLD AUTO: 502 K/UL (ref 135–420)
PLATELET COMMENTS,PCOM: ABNORMAL
PLATELET COMMENTS,PCOM: ABNORMAL
PMV BLD AUTO: 10.2 FL (ref 9.2–11.8)
PMV BLD AUTO: 10.6 FL (ref 9.2–11.8)
PMV BLD AUTO: 8.8 FL (ref 9.2–11.8)
PMV BLD AUTO: 8.9 FL (ref 9.2–11.8)
PMV BLD AUTO: 8.9 FL (ref 9.2–11.8)
PMV BLD AUTO: 9 FL (ref 9.2–11.8)
PMV BLD AUTO: 9 FL (ref 9.2–11.8)
PMV BLD AUTO: 9.2 FL (ref 9.2–11.8)
PMV BLD AUTO: 9.3 FL (ref 9.2–11.8)
PMV BLD AUTO: 9.3 FL (ref 9.2–11.8)
PMV BLD AUTO: 9.9 FL (ref 9.2–11.8)
PO2 BLD: 163 MMHG (ref 80–100)
PO2 BLD: 80 MMHG (ref 80–100)
POTASSIUM SERPL-SCNC: 3.6 MMOL/L (ref 3.5–5.5)
POTASSIUM SERPL-SCNC: 3.6 MMOL/L (ref 3.5–5.5)
POTASSIUM SERPL-SCNC: 3.7 MMOL/L (ref 3.5–5.5)
POTASSIUM SERPL-SCNC: 3.8 MMOL/L (ref 3.5–5.5)
POTASSIUM SERPL-SCNC: 4 MMOL/L (ref 3.5–5.5)
POTASSIUM SERPL-SCNC: 4.1 MMOL/L (ref 3.5–5.5)
POTASSIUM SERPL-SCNC: 4.2 MMOL/L (ref 3.5–5.5)
POTASSIUM SERPL-SCNC: 4.3 MMOL/L (ref 3.5–5.5)
POTASSIUM SERPL-SCNC: 4.9 MMOL/L (ref 3.5–5.5)
PRESSURE SUPPORT SETTING VENT: 5 CMH2O
PROT SERPL-MCNC: 5.4 G/DL (ref 6.4–8.2)
PROT SERPL-MCNC: 6 G/DL (ref 6.4–8.2)
PROT SERPL-MCNC: 6.3 G/DL (ref 6.4–8.2)
PROT SERPL-MCNC: 8.7 G/DL (ref 6.4–8.2)
PROT UR STRIP-MCNC: 100 MG/DL
PROT UR STRIP-MCNC: NEGATIVE MG/DL
PROTHROMBIN TIME: 12.9 SEC (ref 11.5–15.2)
PROTHROMBIN TIME: 13.4 SEC (ref 11.5–15.2)
PROTHROMBIN TIME: 13.5 SEC (ref 11.5–15.2)
PROTHROMBIN TIME: 13.6 SEC (ref 11.5–15.2)
PROTHROMBIN TIME: 14 SEC (ref 11.5–15.2)
PROTHROMBIN TIME: 14.1 SEC (ref 11.5–15.2)
PROTHROMBIN TIME: 14.2 SEC (ref 11.5–15.2)
PROTHROMBIN TIME: 14.8 SEC (ref 11.5–15.2)
PROTHROMBIN TIME: 14.9 SEC (ref 11.5–15.2)
PROTHROMBIN TIME: 14.9 SEC (ref 11.5–15.2)
PROTHROMBIN TIME: 15.2 SEC (ref 11.5–15.2)
PROTHROMBIN TIME: 15.2 SEC (ref 11.5–15.2)
PROTHROMBIN TIME: 16.6 SEC (ref 11.5–15.2)
PROTHROMBIN TIME: 21.2 SEC (ref 11.5–15.2)
PROTHROMBIN TIME: 24.1 SEC (ref 11.5–15.2)
PROTHROMBIN TIME: 25.2 SEC (ref 11.5–15.2)
PROTHROMBIN TIME: 37.4 SEC (ref 11.5–15.2)
Q-T INTERVAL, ECG07: 288 MS
Q-T INTERVAL, ECG07: 320 MS
Q-T INTERVAL, ECG07: 348 MS
QRS DURATION, ECG06: 118 MS
QRS DURATION, ECG06: 124 MS
QRS DURATION, ECG06: 126 MS
QTC CALCULATION (BEZET), ECG08: 425 MS
QTC CALCULATION (BEZET), ECG08: 438 MS
QTC CALCULATION (BEZET), ECG08: 455 MS
RBC # BLD AUTO: 3.22 M/UL (ref 4.2–5.3)
RBC # BLD AUTO: 3.24 M/UL (ref 4.2–5.3)
RBC # BLD AUTO: 3.28 M/UL (ref 4.2–5.3)
RBC # BLD AUTO: 3.29 M/UL (ref 4.2–5.3)
RBC # BLD AUTO: 3.31 M/UL (ref 4.2–5.3)
RBC # BLD AUTO: 3.36 M/UL (ref 4.2–5.3)
RBC # BLD AUTO: 3.43 M/UL (ref 4.2–5.3)
RBC # BLD AUTO: 3.89 M/UL (ref 4.2–5.3)
RBC # BLD AUTO: 3.98 M/UL (ref 4.2–5.3)
RBC # BLD AUTO: 4.01 M/UL (ref 4.2–5.3)
RBC # BLD AUTO: 4.7 M/UL (ref 4.2–5.3)
RBC #/AREA URNS HPF: ABNORMAL /HPF (ref 0–5)
RBC #/AREA URNS HPF: NEGATIVE /HPF (ref 0–5)
RBC MORPH BLD: ABNORMAL
REPORTED DOSE,DOSE: ABNORMAL UNITS
REPORTED DOSE,DOSE: ABNORMAL UNITS
REPORTED DOSE/TIME,TMG: 1000
REPORTED DOSE/TIME,TMG: 1100
SAO2 % BLD: 100 % (ref 92–97)
SAO2 % BLD: 96 % (ref 92–97)
SERVICE CMNT-IMP: ABNORMAL
SERVICE CMNT-IMP: ABNORMAL
SERVICE CMNT-IMP: NORMAL
SODIUM SERPL-SCNC: 133 MMOL/L (ref 136–145)
SODIUM SERPL-SCNC: 134 MMOL/L (ref 136–145)
SODIUM SERPL-SCNC: 134 MMOL/L (ref 136–145)
SODIUM SERPL-SCNC: 136 MMOL/L (ref 136–145)
SODIUM SERPL-SCNC: 137 MMOL/L (ref 136–145)
SODIUM SERPL-SCNC: 137 MMOL/L (ref 136–145)
SODIUM SERPL-SCNC: 138 MMOL/L (ref 136–145)
SODIUM SERPL-SCNC: 138 MMOL/L (ref 136–145)
SODIUM SERPL-SCNC: 139 MMOL/L (ref 136–145)
SODIUM SERPL-SCNC: 139 MMOL/L (ref 136–145)
SODIUM SERPL-SCNC: 141 MMOL/L (ref 136–145)
SP GR UR REFRACTOMETRY: 1.03 (ref 1–1.03)
SP GR UR REFRACTOMETRY: >1.03 (ref 1–1.03)
SPECIMEN EXP DATE BLD: NORMAL
SPECIMEN EXP DATE BLD: NORMAL
SPECIMEN TYPE: ABNORMAL
SPECIMEN TYPE: ABNORMAL
SPONTANEOUS TIMED, IST: YES
STATUS OF UNIT,%ST: NORMAL
TOTAL RESP. RATE, ITRR: 14
TOTAL RESP. RATE, ITRR: 28
TROPONIN I SERPL-MCNC: 0.02 NG/ML (ref 0–0.04)
TROPONIN I SERPL-MCNC: 0.02 NG/ML (ref 0–0.04)
TROPONIN I SERPL-MCNC: 0.03 NG/ML (ref 0–0.04)
TROPONIN I SERPL-MCNC: 0.03 NG/ML (ref 0–0.04)
TROPONIN I SERPL-MCNC: 0.08 NG/ML (ref 0–0.04)
UNIT DIVISION, %UDIV: 0
URATE CRY URNS QL MICRO: ABNORMAL
UROBILINOGEN UR QL STRIP.AUTO: 1 EU/DL (ref 0.2–1)
UROBILINOGEN UR QL STRIP.AUTO: 1 EU/DL (ref 0.2–1)
VANCOMYCIN TROUGH SERPL-MCNC: 2.5 UG/ML (ref 10–20)
VANCOMYCIN TROUGH SERPL-MCNC: 4.7 UG/ML (ref 10–20)
VENTRICULAR RATE, ECG03: 103 BPM
VENTRICULAR RATE, ECG03: 113 BPM
VENTRICULAR RATE, ECG03: 131 BPM
WBC # BLD AUTO: 11.2 K/UL (ref 4.6–13.2)
WBC # BLD AUTO: 11.7 K/UL (ref 4.6–13.2)
WBC # BLD AUTO: 11.8 K/UL (ref 4.6–13.2)
WBC # BLD AUTO: 11.8 K/UL (ref 4.6–13.2)
WBC # BLD AUTO: 12.3 K/UL (ref 4.6–13.2)
WBC # BLD AUTO: 12.5 K/UL (ref 4.6–13.2)
WBC # BLD AUTO: 12.7 K/UL (ref 4.6–13.2)
WBC # BLD AUTO: 12.8 K/UL (ref 4.6–13.2)
WBC # BLD AUTO: 13.7 K/UL (ref 4.6–13.2)
WBC # BLD AUTO: 14 K/UL (ref 4.6–13.2)
WBC # BLD AUTO: 17.7 K/UL (ref 4.6–13.2)
WBC CASTS URNS QL MICRO: ABNORMAL /LPF
WBC URNS QL MICRO: ABNORMAL /HPF (ref 0–4)
WBC URNS QL MICRO: ABNORMAL /HPF (ref 0–4)

## 2017-01-01 PROCEDURE — G0156 HHCP-SVS OF AIDE,EA 15 MIN: HCPCS

## 2017-01-01 PROCEDURE — 85025 COMPLETE CBC W/AUTO DIFF WBC: CPT | Performed by: INTERNAL MEDICINE

## 2017-01-01 PROCEDURE — G0299 HHS/HOSPICE OF RN EA 15 MIN: HCPCS

## 2017-01-01 PROCEDURE — 74011250637 HC RX REV CODE- 250/637: Performed by: INTERNAL MEDICINE

## 2017-01-01 PROCEDURE — 94640 AIRWAY INHALATION TREATMENT: CPT

## 2017-01-01 PROCEDURE — 0651 HSPC ROUTINE HOME CARE

## 2017-01-01 PROCEDURE — 74011250637 HC RX REV CODE- 250/637: Performed by: NURSE PRACTITIONER

## 2017-01-01 PROCEDURE — 83735 ASSAY OF MAGNESIUM: CPT | Performed by: INTERNAL MEDICINE

## 2017-01-01 PROCEDURE — 74011250636 HC RX REV CODE- 250/636: Performed by: INTERNAL MEDICINE

## 2017-01-01 PROCEDURE — 36415 COLL VENOUS BLD VENIPUNCTURE: CPT | Performed by: ORTHOPAEDIC SURGERY

## 2017-01-01 PROCEDURE — 36415 COLL VENOUS BLD VENIPUNCTURE: CPT | Performed by: INTERNAL MEDICINE

## 2017-01-01 PROCEDURE — 74011636637 HC RX REV CODE- 636/637: Performed by: PHYSICIAN ASSISTANT

## 2017-01-01 PROCEDURE — 74011250636 HC RX REV CODE- 250/636: Performed by: EMERGENCY MEDICINE

## 2017-01-01 PROCEDURE — 80202 ASSAY OF VANCOMYCIN: CPT | Performed by: INTERNAL MEDICINE

## 2017-01-01 PROCEDURE — 85610 PROTHROMBIN TIME: CPT | Performed by: ORTHOPAEDIC SURGERY

## 2017-01-01 PROCEDURE — 85730 THROMBOPLASTIN TIME PARTIAL: CPT | Performed by: ORTHOPAEDIC SURGERY

## 2017-01-01 PROCEDURE — 85610 PROTHROMBIN TIME: CPT | Performed by: INTERNAL MEDICINE

## 2017-01-01 PROCEDURE — 77030011256 HC DRSG MEPILEX <16IN NO BORD MOLN -A

## 2017-01-01 PROCEDURE — 71010 XR CHEST PORT: CPT

## 2017-01-01 PROCEDURE — 82378 CARCINOEMBRYONIC ANTIGEN: CPT | Performed by: INTERNAL MEDICINE

## 2017-01-01 PROCEDURE — T4528 ADULT SIZE PULL-ON XL: HCPCS

## 2017-01-01 PROCEDURE — 93005 ELECTROCARDIOGRAM TRACING: CPT

## 2017-01-01 PROCEDURE — 74011000250 HC RX REV CODE- 250: Performed by: INTERNAL MEDICINE

## 2017-01-01 PROCEDURE — 72156 MRI NECK SPINE W/O & W/DYE: CPT

## 2017-01-01 PROCEDURE — 70450 CT HEAD/BRAIN W/O DYE: CPT

## 2017-01-01 PROCEDURE — 85018 HEMOGLOBIN: CPT | Performed by: INTERNAL MEDICINE

## 2017-01-01 PROCEDURE — 82962 GLUCOSE BLOOD TEST: CPT

## 2017-01-01 PROCEDURE — 82550 ASSAY OF CK (CPK): CPT | Performed by: INTERNAL MEDICINE

## 2017-01-01 PROCEDURE — 65270000029 HC RM PRIVATE

## 2017-01-01 PROCEDURE — 86900 BLOOD TYPING SEROLOGIC ABO: CPT | Performed by: SURGERY

## 2017-01-01 PROCEDURE — 74011000272 HC RX REV CODE- 272: Performed by: INTERNAL MEDICINE

## 2017-01-01 PROCEDURE — 97530 THERAPEUTIC ACTIVITIES: CPT

## 2017-01-01 PROCEDURE — 85730 THROMBOPLASTIN TIME PARTIAL: CPT | Performed by: INTERNAL MEDICINE

## 2017-01-01 PROCEDURE — 74011000258 HC RX REV CODE- 258: Performed by: INTERNAL MEDICINE

## 2017-01-01 PROCEDURE — 86300 IMMUNOASSAY TUMOR CA 15-3: CPT | Performed by: INTERNAL MEDICINE

## 2017-01-01 PROCEDURE — 74011636320 HC RX REV CODE- 636/320: Performed by: INTERNAL MEDICINE

## 2017-01-01 PROCEDURE — 77010033678 HC OXYGEN DAILY

## 2017-01-01 PROCEDURE — 36600 WITHDRAWAL OF ARTERIAL BLOOD: CPT

## 2017-01-01 PROCEDURE — 97161 PT EVAL LOW COMPLEX 20 MIN: CPT

## 2017-01-01 PROCEDURE — P9059 PLASMA, FRZ BETWEEN 8-24HOUR: HCPCS | Performed by: INTERNAL MEDICINE

## 2017-01-01 PROCEDURE — 85610 PROTHROMBIN TIME: CPT | Performed by: SURGERY

## 2017-01-01 PROCEDURE — 85025 COMPLETE CBC W/AUTO DIFF WBC: CPT | Performed by: ORTHOPAEDIC SURGERY

## 2017-01-01 PROCEDURE — 74011250637 HC RX REV CODE- 250/637: Performed by: FAMILY MEDICINE

## 2017-01-01 PROCEDURE — 87040 BLOOD CULTURE FOR BACTERIA: CPT | Performed by: EMERGENCY MEDICINE

## 2017-01-01 PROCEDURE — A9585 GADOBUTROL INJECTION: HCPCS | Performed by: EMERGENCY MEDICINE

## 2017-01-01 PROCEDURE — 94760 N-INVAS EAR/PLS OXIMETRY 1: CPT

## 2017-01-01 PROCEDURE — 74011250636 HC RX REV CODE- 250/636: Performed by: PHYSICIAN ASSISTANT

## 2017-01-01 PROCEDURE — 83735 ASSAY OF MAGNESIUM: CPT | Performed by: EMERGENCY MEDICINE

## 2017-01-01 PROCEDURE — HOSPICE MEDICATION HC HH HOSPICE MEDICATION

## 2017-01-01 PROCEDURE — G0155 HHCP-SVS OF CSW,EA 15 MIN: HCPCS

## 2017-01-01 PROCEDURE — 74011636320 HC RX REV CODE- 636/320: Performed by: ORTHOPAEDIC SURGERY

## 2017-01-01 PROCEDURE — 87641 MR-STAPH DNA AMP PROBE: CPT | Performed by: INTERNAL MEDICINE

## 2017-01-01 PROCEDURE — 97166 OT EVAL MOD COMPLEX 45 MIN: CPT

## 2017-01-01 PROCEDURE — 85018 HEMOGLOBIN: CPT | Performed by: ORTHOPAEDIC SURGERY

## 2017-01-01 PROCEDURE — 80053 COMPREHEN METABOLIC PANEL: CPT | Performed by: EMERGENCY MEDICINE

## 2017-01-01 PROCEDURE — 3336500001 HSPC ELECTION

## 2017-01-01 PROCEDURE — 65610000006 HC RM INTENSIVE CARE

## 2017-01-01 PROCEDURE — 87186 SC STD MICRODIL/AGAR DIL: CPT | Performed by: EMERGENCY MEDICINE

## 2017-01-01 PROCEDURE — A6252 ABSORPT DRG >16 <=48 W/O BDR: HCPCS

## 2017-01-01 PROCEDURE — 80048 BASIC METABOLIC PNL TOTAL CA: CPT | Performed by: ORTHOPAEDIC SURGERY

## 2017-01-01 PROCEDURE — 80048 BASIC METABOLIC PNL TOTAL CA: CPT | Performed by: INTERNAL MEDICINE

## 2017-01-01 PROCEDURE — 96365 THER/PROPH/DIAG IV INF INIT: CPT

## 2017-01-01 PROCEDURE — 85730 THROMBOPLASTIN TIME PARTIAL: CPT | Performed by: NURSE PRACTITIONER

## 2017-01-01 PROCEDURE — 80048 BASIC METABOLIC PNL TOTAL CA: CPT | Performed by: NURSE PRACTITIONER

## 2017-01-01 PROCEDURE — 81001 URINALYSIS AUTO W/SCOPE: CPT | Performed by: EMERGENCY MEDICINE

## 2017-01-01 PROCEDURE — 77030005538 HC CATH URETH FOL44 BARD -B

## 2017-01-01 PROCEDURE — 96367 TX/PROPH/DG ADDL SEQ IV INF: CPT

## 2017-01-01 PROCEDURE — 88360 TUMOR IMMUNOHISTOCHEM/MANUAL: CPT | Performed by: FAMILY MEDICINE

## 2017-01-01 PROCEDURE — 74011250637 HC RX REV CODE- 250/637: Performed by: HOSPITALIST

## 2017-01-01 PROCEDURE — A4216 STERILE WATER/SALINE, 10 ML: HCPCS

## 2017-01-01 PROCEDURE — 82550 ASSAY OF CK (CPK): CPT | Performed by: ORTHOPAEDIC SURGERY

## 2017-01-01 PROCEDURE — 74011250637 HC RX REV CODE- 250/637: Performed by: EMERGENCY MEDICINE

## 2017-01-01 PROCEDURE — 71275 CT ANGIOGRAPHY CHEST: CPT

## 2017-01-01 PROCEDURE — 83605 ASSAY OF LACTIC ACID: CPT | Performed by: EMERGENCY MEDICINE

## 2017-01-01 PROCEDURE — 80053 COMPREHEN METABOLIC PANEL: CPT | Performed by: INTERNAL MEDICINE

## 2017-01-01 PROCEDURE — 85730 THROMBOPLASTIN TIME PARTIAL: CPT | Performed by: EMERGENCY MEDICINE

## 2017-01-01 PROCEDURE — A6253 ABSORPT DRG > 48 SQ IN W/O B: HCPCS

## 2017-01-01 PROCEDURE — A6223 GAUZE >16<=48 NO W/SAL W/O B: HCPCS

## 2017-01-01 PROCEDURE — 94660 CPAP INITIATION&MGMT: CPT

## 2017-01-01 PROCEDURE — 0HBU0ZX EXCISION OF LEFT BREAST, OPEN APPROACH, DIAGNOSTIC: ICD-10-PCS | Performed by: SURGERY

## 2017-01-01 PROCEDURE — 82550 ASSAY OF CK (CPK): CPT | Performed by: EMERGENCY MEDICINE

## 2017-01-01 PROCEDURE — 72125 CT NECK SPINE W/O DYE: CPT

## 2017-01-01 PROCEDURE — A4452 WATERPROOF TAPE: HCPCS

## 2017-01-01 PROCEDURE — 30233K1 TRANSFUSION OF NONAUTOLOGOUS FROZEN PLASMA INTO PERIPHERAL VEIN, PERCUTANEOUS APPROACH: ICD-10-PCS | Performed by: INTERNAL MEDICINE

## 2017-01-01 PROCEDURE — 85610 PROTHROMBIN TIME: CPT | Performed by: EMERGENCY MEDICINE

## 2017-01-01 PROCEDURE — 77030036547 HC CLLR CERV FOAM S2SG -A

## 2017-01-01 PROCEDURE — 85025 COMPLETE CBC W/AUTO DIFF WBC: CPT | Performed by: EMERGENCY MEDICINE

## 2017-01-01 PROCEDURE — 88374 M/PHMTRC ALYS ISHQUANT/SEMIQ: CPT | Performed by: FAMILY MEDICINE

## 2017-01-01 PROCEDURE — 96374 THER/PROPH/DIAG INJ IV PUSH: CPT

## 2017-01-01 PROCEDURE — G0300 HHS/HOSPICE OF LPN EA 15 MIN: HCPCS

## 2017-01-01 PROCEDURE — T4543 ADULT DISP BRIEF/DIAP ABV XL: HCPCS

## 2017-01-01 PROCEDURE — 87077 CULTURE AEROBIC IDENTIFY: CPT | Performed by: EMERGENCY MEDICINE

## 2017-01-01 PROCEDURE — 80076 HEPATIC FUNCTION PANEL: CPT | Performed by: ORTHOPAEDIC SURGERY

## 2017-01-01 PROCEDURE — 99285 EMERGENCY DEPT VISIT HI MDM: CPT

## 2017-01-01 PROCEDURE — 93970 EXTREMITY STUDY: CPT

## 2017-01-01 PROCEDURE — 77030005563 HC CATH URETH INT MMGH -A

## 2017-01-01 PROCEDURE — 82803 BLOOD GASES ANY COMBINATION: CPT

## 2017-01-01 PROCEDURE — 96361 HYDRATE IV INFUSION ADD-ON: CPT

## 2017-01-01 PROCEDURE — 74011000258 HC RX REV CODE- 258: Performed by: EMERGENCY MEDICINE

## 2017-01-01 PROCEDURE — A6402 STERILE GAUZE <= 16 SQ IN: HCPCS

## 2017-01-01 PROCEDURE — 86920 COMPATIBILITY TEST SPIN: CPT | Performed by: PHYSICIAN ASSISTANT

## 2017-01-01 PROCEDURE — 77030027138 HC INCENT SPIROMETER -A

## 2017-01-01 PROCEDURE — 88305 TISSUE EXAM BY PATHOLOGIST: CPT | Performed by: FAMILY MEDICINE

## 2017-01-01 PROCEDURE — 30233N1 TRANSFUSION OF NONAUTOLOGOUS RED BLOOD CELLS INTO PERIPHERAL VEIN, PERCUTANEOUS APPROACH: ICD-10-PCS | Performed by: INTERNAL MEDICINE

## 2017-01-01 PROCEDURE — A9503 TC99M MEDRONATE: HCPCS

## 2017-01-01 PROCEDURE — 87040 BLOOD CULTURE FOR BACTERIA: CPT | Performed by: INTERNAL MEDICINE

## 2017-01-01 PROCEDURE — 87086 URINE CULTURE/COLONY COUNT: CPT | Performed by: EMERGENCY MEDICINE

## 2017-01-01 PROCEDURE — 77010033711 HC HIGH FLOW OXYGEN

## 2017-01-01 PROCEDURE — 86900 BLOOD TYPING SEROLOGIC ABO: CPT | Performed by: PHYSICIAN ASSISTANT

## 2017-01-01 PROCEDURE — 76450000000

## 2017-01-01 PROCEDURE — 71260 CT THORAX DX C+: CPT

## 2017-01-01 PROCEDURE — 85027 COMPLETE CBC AUTOMATED: CPT | Performed by: ORTHOPAEDIC SURGERY

## 2017-01-01 PROCEDURE — 36430 TRANSFUSION BLD/BLD COMPNT: CPT

## 2017-01-01 RX ORDER — SODIUM CHLORIDE 0.9 % (FLUSH) 0.9 %
5-10 SYRINGE (ML) INJECTION EVERY 8 HOURS
Status: DISCONTINUED | OUTPATIENT
Start: 2017-01-01 | End: 2017-01-01

## 2017-01-01 RX ORDER — SODIUM CHLORIDE 0.9 % (FLUSH) 0.9 %
5-10 SYRINGE (ML) INJECTION AS NEEDED
Status: DISCONTINUED | OUTPATIENT
Start: 2017-01-01 | End: 2017-01-01 | Stop reason: HOSPADM

## 2017-01-01 RX ORDER — LORAZEPAM 0.5 MG/1
0.5 TABLET ORAL
Status: DISCONTINUED | OUTPATIENT
Start: 2017-01-01 | End: 2017-01-01 | Stop reason: HOSPADM

## 2017-01-01 RX ORDER — IPRATROPIUM BROMIDE AND ALBUTEROL SULFATE 2.5; .5 MG/3ML; MG/3ML
3 SOLUTION RESPIRATORY (INHALATION)
Status: DISCONTINUED | OUTPATIENT
Start: 2017-01-01 | End: 2017-01-01 | Stop reason: HOSPADM

## 2017-01-01 RX ORDER — ENOXAPARIN SODIUM 150 MG/ML
130 INJECTION SUBCUTANEOUS EVERY 24 HOURS
Status: DISCONTINUED | OUTPATIENT
Start: 2017-01-01 | End: 2017-01-01 | Stop reason: HOSPADM

## 2017-01-01 RX ORDER — DEXAMETHASONE SODIUM PHOSPHATE 4 MG/ML
4 INJECTION, SOLUTION INTRA-ARTICULAR; INTRALESIONAL; INTRAMUSCULAR; INTRAVENOUS; SOFT TISSUE EVERY 6 HOURS
Status: DISCONTINUED | OUTPATIENT
Start: 2017-01-01 | End: 2017-01-01

## 2017-01-01 RX ORDER — MORPHINE SULFATE 2 MG/ML
2 INJECTION, SOLUTION INTRAMUSCULAR; INTRAVENOUS
Status: DISCONTINUED | OUTPATIENT
Start: 2017-01-01 | End: 2017-01-01

## 2017-01-01 RX ORDER — MAGNESIUM SULFATE 100 %
4 CRYSTALS MISCELLANEOUS AS NEEDED
Status: DISCONTINUED | OUTPATIENT
Start: 2017-01-01 | End: 2017-01-01 | Stop reason: HOSPADM

## 2017-01-01 RX ORDER — WARFARIN 7.5 MG/1
7.5 TABLET ORAL EVERY EVENING
Qty: 5 TAB | Refills: 0 | Status: SHIPPED | OUTPATIENT
Start: 2017-01-01 | End: 2017-01-01

## 2017-01-01 RX ORDER — SCOLOPAMINE TRANSDERMAL SYSTEM 1 MG/1
1.5 PATCH, EXTENDED RELEASE TRANSDERMAL
Status: DISCONTINUED | OUTPATIENT
Start: 2017-01-01 | End: 2017-01-01 | Stop reason: HOSPADM

## 2017-01-01 RX ORDER — IBUPROFEN 800 MG/1
800 TABLET ORAL EVERY 8 HOURS
Qty: 15 TAB | Refills: 0 | Status: SHIPPED | OUTPATIENT
Start: 2017-01-01 | End: 2017-01-01

## 2017-01-01 RX ORDER — ENOXAPARIN SODIUM 150 MG/ML
130 INJECTION SUBCUTANEOUS EVERY 24 HOURS
Qty: 10 SYRINGE | Refills: 0 | Status: SHIPPED
Start: 2017-01-01

## 2017-01-01 RX ORDER — IPRATROPIUM BROMIDE AND ALBUTEROL SULFATE 2.5; .5 MG/3ML; MG/3ML
3 SOLUTION RESPIRATORY (INHALATION)
Status: DISCONTINUED | OUTPATIENT
Start: 2017-01-01 | End: 2017-01-01

## 2017-01-01 RX ORDER — SODIUM CHLORIDE, SODIUM LACTATE, POTASSIUM CHLORIDE, CALCIUM CHLORIDE 600; 310; 30; 20 MG/100ML; MG/100ML; MG/100ML; MG/100ML
75 INJECTION, SOLUTION INTRAVENOUS CONTINUOUS
Status: DISPENSED | OUTPATIENT
Start: 2017-01-01 | End: 2017-01-01

## 2017-01-01 RX ORDER — DOCUSATE SODIUM 100 MG/1
100 CAPSULE, LIQUID FILLED ORAL 2 TIMES DAILY
Qty: 60 CAP | Refills: 2 | Status: SHIPPED
Start: 2017-01-01 | End: 2017-01-01

## 2017-01-01 RX ORDER — ONDANSETRON 2 MG/ML
4 INJECTION INTRAMUSCULAR; INTRAVENOUS
Status: DISCONTINUED | OUTPATIENT
Start: 2017-01-01 | End: 2017-01-01 | Stop reason: HOSPADM

## 2017-01-01 RX ORDER — FAMOTIDINE 20 MG/1
20 TABLET, FILM COATED ORAL 2 TIMES DAILY
Status: DISCONTINUED | OUTPATIENT
Start: 2017-01-01 | End: 2017-01-01 | Stop reason: HOSPADM

## 2017-01-01 RX ORDER — HYDROCODONE BITARTRATE AND ACETAMINOPHEN 7.5; 325 MG/1; MG/1
1 TABLET ORAL
COMMUNITY

## 2017-01-01 RX ORDER — FAMOTIDINE 20 MG/1
20 TABLET, FILM COATED ORAL
Status: DISCONTINUED | OUTPATIENT
Start: 2017-01-01 | End: 2017-01-01

## 2017-01-01 RX ORDER — SODIUM CHLORIDE 9 MG/ML
250 INJECTION, SOLUTION INTRAVENOUS AS NEEDED
Status: DISCONTINUED | OUTPATIENT
Start: 2017-01-01 | End: 2017-01-01 | Stop reason: SDUPTHER

## 2017-01-01 RX ORDER — CELECOXIB 100 MG/1
400 CAPSULE ORAL
Status: DISCONTINUED | OUTPATIENT
Start: 2017-01-01 | End: 2017-01-01

## 2017-01-01 RX ORDER — DEXAMETHASONE 4 MG/1
4 TABLET ORAL EVERY 12 HOURS
Status: DISCONTINUED | OUTPATIENT
Start: 2017-01-01 | End: 2017-01-01 | Stop reason: HOSPADM

## 2017-01-01 RX ORDER — OXYCODONE AND ACETAMINOPHEN 5; 325 MG/1; MG/1
1-2 TABLET ORAL
Status: DISCONTINUED | OUTPATIENT
Start: 2017-01-01 | End: 2017-01-01 | Stop reason: HOSPADM

## 2017-01-01 RX ORDER — SODIUM CHLORIDE 9 MG/ML
150 INJECTION, SOLUTION INTRAVENOUS CONTINUOUS
Status: DISCONTINUED | OUTPATIENT
Start: 2017-01-01 | End: 2017-01-01

## 2017-01-01 RX ORDER — LORAZEPAM 2 MG/ML
1 INJECTION INTRAMUSCULAR
Status: DISCONTINUED | OUTPATIENT
Start: 2017-01-01 | End: 2017-01-01 | Stop reason: HOSPADM

## 2017-01-01 RX ORDER — SODIUM CHLORIDE 9 MG/ML
75 INJECTION, SOLUTION INTRAVENOUS CONTINUOUS
Status: DISCONTINUED | OUTPATIENT
Start: 2017-01-01 | End: 2017-01-01

## 2017-01-01 RX ORDER — DEXTROSE 50 % IN WATER (D50W) INTRAVENOUS SYRINGE
25-50 AS NEEDED
Status: DISCONTINUED | OUTPATIENT
Start: 2017-01-01 | End: 2017-01-01 | Stop reason: HOSPADM

## 2017-01-01 RX ORDER — OXYCODONE AND ACETAMINOPHEN 5; 325 MG/1; MG/1
1 TABLET ORAL
Status: COMPLETED | OUTPATIENT
Start: 2017-01-01 | End: 2017-01-01

## 2017-01-01 RX ORDER — OXYCODONE AND ACETAMINOPHEN 5; 325 MG/1; MG/1
1-2 TABLET ORAL
Qty: 10 TAB | Refills: 0 | Status: SHIPPED | OUTPATIENT
Start: 2017-01-01

## 2017-01-01 RX ORDER — FENTANYL 50 UG/1
1 PATCH TRANSDERMAL
COMMUNITY

## 2017-01-01 RX ORDER — WARFARIN 7.5 MG/1
7.5 TABLET ORAL EVERY EVENING
Status: DISCONTINUED | OUTPATIENT
Start: 2017-01-01 | End: 2017-01-01 | Stop reason: HOSPADM

## 2017-01-01 RX ORDER — INSULIN LISPRO 100 [IU]/ML
INJECTION, SOLUTION INTRAVENOUS; SUBCUTANEOUS
Qty: 1 VIAL | Refills: 0 | Status: SHIPPED
Start: 2017-01-01

## 2017-01-01 RX ORDER — OXYCODONE AND ACETAMINOPHEN 5; 325 MG/1; MG/1
1 TABLET ORAL
Status: DISCONTINUED | OUTPATIENT
Start: 2017-01-01 | End: 2017-01-01

## 2017-01-01 RX ORDER — LORAZEPAM 1 MG/1
1 TABLET ORAL
Status: DISCONTINUED | OUTPATIENT
Start: 2017-01-01 | End: 2017-01-01

## 2017-01-01 RX ORDER — PREGABALIN 50 MG/1
50 CAPSULE ORAL
Status: DISCONTINUED | OUTPATIENT
Start: 2017-01-01 | End: 2017-01-01 | Stop reason: HOSPADM

## 2017-01-01 RX ORDER — MORPHINE SULFATE 20 MG/ML
10 SOLUTION ORAL
Status: DISCONTINUED | OUTPATIENT
Start: 2017-01-01 | End: 2017-01-01 | Stop reason: HOSPADM

## 2017-01-01 RX ORDER — LORAZEPAM 0.5 MG/1
0.5 TABLET ORAL
Qty: 15 TAB | Refills: 0 | Status: SHIPPED | OUTPATIENT
Start: 2017-01-01

## 2017-01-01 RX ORDER — PHYTONADIONE 5 MG/1
10 TABLET ORAL
Status: DISCONTINUED | OUTPATIENT
Start: 2017-01-01 | End: 2017-01-01

## 2017-01-01 RX ORDER — ACETAMINOPHEN 500 MG
500 TABLET ORAL
Status: DISCONTINUED | OUTPATIENT
Start: 2017-01-01 | End: 2017-01-01 | Stop reason: HOSPADM

## 2017-01-01 RX ORDER — LORAZEPAM 2 MG/ML
1 INJECTION INTRAMUSCULAR
Status: DISCONTINUED | OUTPATIENT
Start: 2017-01-01 | End: 2017-01-01

## 2017-01-01 RX ORDER — DOCUSATE SODIUM 100 MG/1
100 CAPSULE, LIQUID FILLED ORAL 2 TIMES DAILY
Status: DISCONTINUED | OUTPATIENT
Start: 2017-01-01 | End: 2017-01-01 | Stop reason: HOSPADM

## 2017-01-01 RX ORDER — BUPIVACAINE HYDROCHLORIDE 2.5 MG/ML
0.25 INJECTION, SOLUTION EPIDURAL; INFILTRATION; INTRACAUDAL ONCE
Status: DISPENSED | OUTPATIENT
Start: 2017-01-01 | End: 2017-01-01

## 2017-01-01 RX ORDER — HEPARIN SODIUM 10000 [USP'U]/100ML
18-36 INJECTION, SOLUTION INTRAVENOUS
Status: DISCONTINUED | OUTPATIENT
Start: 2017-01-01 | End: 2017-01-01

## 2017-01-01 RX ORDER — FAMOTIDINE 20 MG/1
20 TABLET, FILM COATED ORAL 2 TIMES DAILY
Qty: 60 TAB | Refills: 0 | Status: SHIPPED
Start: 2017-01-01

## 2017-01-01 RX ORDER — ENOXAPARIN SODIUM 100 MG/ML
80 INJECTION SUBCUTANEOUS
Status: COMPLETED | OUTPATIENT
Start: 2017-01-01 | End: 2017-01-01

## 2017-01-01 RX ORDER — ENOXAPARIN SODIUM 100 MG/ML
40 INJECTION SUBCUTANEOUS EVERY 24 HOURS
Status: DISPENSED | OUTPATIENT
Start: 2017-01-01 | End: 2017-01-01

## 2017-01-01 RX ORDER — HYDROMORPHONE HYDROCHLORIDE 1 MG/ML
0.5 INJECTION, SOLUTION INTRAMUSCULAR; INTRAVENOUS; SUBCUTANEOUS
Status: DISCONTINUED | OUTPATIENT
Start: 2017-01-01 | End: 2017-01-01

## 2017-01-01 RX ORDER — DEXAMETHASONE 4 MG/1
4 TABLET ORAL 2 TIMES DAILY WITH MEALS
Qty: 30 TAB | Refills: 0 | Status: SHIPPED
Start: 2017-01-01

## 2017-01-01 RX ORDER — SODIUM CHLORIDE 9 MG/ML
250 INJECTION, SOLUTION INTRAVENOUS AS NEEDED
Status: DISCONTINUED | OUTPATIENT
Start: 2017-01-01 | End: 2017-01-01 | Stop reason: HOSPADM

## 2017-01-01 RX ORDER — CYCLOBENZAPRINE HCL 5 MG
10 TABLET ORAL
Qty: 15 TAB | Refills: 0 | Status: SHIPPED | OUTPATIENT
Start: 2017-01-01

## 2017-01-01 RX ORDER — ACETAMINOPHEN 650 MG/1
650 SUPPOSITORY RECTAL
Status: DISCONTINUED | OUTPATIENT
Start: 2017-01-01 | End: 2017-01-01 | Stop reason: HOSPADM

## 2017-01-01 RX ORDER — INSULIN LISPRO 100 [IU]/ML
INJECTION, SOLUTION INTRAVENOUS; SUBCUTANEOUS
Status: DISCONTINUED | OUTPATIENT
Start: 2017-01-01 | End: 2017-01-01 | Stop reason: HOSPADM

## 2017-01-01 RX ORDER — DEXTROSE 50 % IN WATER (D50W) INTRAVENOUS SYRINGE
25-50 AS NEEDED
Status: DISCONTINUED | OUTPATIENT
Start: 2017-01-01 | End: 2017-01-01 | Stop reason: SDUPTHER

## 2017-01-01 RX ORDER — CYCLOBENZAPRINE HCL 5 MG
10 TABLET ORAL 3 TIMES DAILY
Qty: 9 TAB | Refills: 0 | Status: ON HOLD | OUTPATIENT
Start: 2017-01-01 | End: 2017-01-01

## 2017-01-01 RX ORDER — HYDROMORPHONE HYDROCHLORIDE 1 MG/ML
0.5 INJECTION, SOLUTION INTRAMUSCULAR; INTRAVENOUS; SUBCUTANEOUS ONCE
Status: COMPLETED | OUTPATIENT
Start: 2017-01-01 | End: 2017-01-01

## 2017-01-01 RX ADMIN — FAMOTIDINE 20 MG: 20 TABLET ORAL at 18:00

## 2017-01-01 RX ADMIN — LORAZEPAM 1 MG: 1 TABLET ORAL at 21:54

## 2017-01-01 RX ADMIN — DEXAMETHASONE SODIUM PHOSPHATE 4 MG: 4 INJECTION, SOLUTION INTRAMUSCULAR; INTRAVENOUS at 15:22

## 2017-01-01 RX ADMIN — ENOXAPARIN SODIUM 40 MG: 40 INJECTION SUBCUTANEOUS at 10:00

## 2017-01-01 RX ADMIN — VANCOMYCIN HYDROCHLORIDE 1000 MG: 1 INJECTION, POWDER, LYOPHILIZED, FOR SOLUTION INTRAVENOUS at 02:51

## 2017-01-01 RX ADMIN — PIPERACILLIN AND TAZOBACTAM 3.38 G: 3; .375 INJECTION, POWDER, FOR SOLUTION INTRAVENOUS at 21:13

## 2017-01-01 RX ADMIN — INSULIN LISPRO 2 UNITS: 100 INJECTION, SOLUTION INTRAVENOUS; SUBCUTANEOUS at 11:37

## 2017-01-01 RX ADMIN — Medication 10 ML: at 22:00

## 2017-01-01 RX ADMIN — IPRATROPIUM BROMIDE AND ALBUTEROL SULFATE 3 ML: .5; 3 SOLUTION RESPIRATORY (INHALATION) at 00:36

## 2017-01-01 RX ADMIN — SODIUM CHLORIDE 75 ML/HR: 900 INJECTION, SOLUTION INTRAVENOUS at 02:30

## 2017-01-01 RX ADMIN — DOCUSATE SODIUM 100 MG: 100 CAPSULE, LIQUID FILLED ORAL at 17:37

## 2017-01-01 RX ADMIN — INSULIN LISPRO 2 UNITS: 100 INJECTION, SOLUTION INTRAVENOUS; SUBCUTANEOUS at 17:28

## 2017-01-01 RX ADMIN — Medication: at 09:50

## 2017-01-01 RX ADMIN — DOCUSATE SODIUM 100 MG: 100 CAPSULE, LIQUID FILLED ORAL at 10:23

## 2017-01-01 RX ADMIN — FAMOTIDINE 20 MG: 20 TABLET ORAL at 09:00

## 2017-01-01 RX ADMIN — Medication: at 22:08

## 2017-01-01 RX ADMIN — DOCUSATE SODIUM 100 MG: 100 CAPSULE, LIQUID FILLED ORAL at 18:33

## 2017-01-01 RX ADMIN — LORAZEPAM 1 MG: 1 TABLET ORAL at 21:56

## 2017-01-01 RX ADMIN — MULTIPLE VITAMINS W/ MINERALS TAB 1 TABLET: TAB at 09:56

## 2017-01-01 RX ADMIN — DEXAMETHASONE SODIUM PHOSPHATE 4 MG: 4 INJECTION, SOLUTION INTRAMUSCULAR; INTRAVENOUS at 17:34

## 2017-01-01 RX ADMIN — DEXAMETHASONE SODIUM PHOSPHATE 4 MG: 4 INJECTION, SOLUTION INTRAMUSCULAR; INTRAVENOUS at 13:48

## 2017-01-01 RX ADMIN — OXYCODONE HYDROCHLORIDE AND ACETAMINOPHEN 1 TABLET: 5; 325 TABLET ORAL at 18:23

## 2017-01-01 RX ADMIN — SODIUM CHLORIDE 75 ML/HR: 900 INJECTION, SOLUTION INTRAVENOUS at 13:58

## 2017-01-01 RX ADMIN — DEXAMETHASONE SODIUM PHOSPHATE 4 MG: 4 INJECTION, SOLUTION INTRAMUSCULAR; INTRAVENOUS at 09:36

## 2017-01-01 RX ADMIN — PIPERACILLIN AND TAZOBACTAM 3.38 G: 3; .375 INJECTION, POWDER, FOR SOLUTION INTRAVENOUS at 09:56

## 2017-01-01 RX ADMIN — DOCUSATE SODIUM 100 MG: 100 CAPSULE, LIQUID FILLED ORAL at 08:53

## 2017-01-01 RX ADMIN — OXYCODONE HYDROCHLORIDE AND ACETAMINOPHEN 1 TABLET: 5; 325 TABLET ORAL at 07:08

## 2017-01-01 RX ADMIN — DEXAMETHASONE SODIUM PHOSPHATE 4 MG: 4 INJECTION, SOLUTION INTRAMUSCULAR; INTRAVENOUS at 06:48

## 2017-01-01 RX ADMIN — FAMOTIDINE 20 MG: 20 TABLET ORAL at 17:49

## 2017-01-01 RX ADMIN — MULTIPLE VITAMINS W/ MINERALS TAB 1 TABLET: TAB at 08:58

## 2017-01-01 RX ADMIN — PIPERACILLIN AND TAZOBACTAM 3.38 G: 3; .375 INJECTION, POWDER, FOR SOLUTION INTRAVENOUS at 06:43

## 2017-01-01 RX ADMIN — DEXAMETHASONE SODIUM PHOSPHATE 4 MG: 4 INJECTION, SOLUTION INTRAMUSCULAR; INTRAVENOUS at 23:22

## 2017-01-01 RX ADMIN — DEXAMETHASONE SODIUM PHOSPHATE 4 MG: 4 INJECTION, SOLUTION INTRAMUSCULAR; INTRAVENOUS at 11:27

## 2017-01-01 RX ADMIN — ENOXAPARIN SODIUM 130 MG: 150 INJECTION, SOLUTION INTRAVENOUS; SUBCUTANEOUS at 14:46

## 2017-01-01 RX ADMIN — DEXAMETHASONE 4 MG: 4 TABLET ORAL at 10:23

## 2017-01-01 RX ADMIN — ENOXAPARIN SODIUM 130 MG: 150 INJECTION, SOLUTION INTRAVENOUS; SUBCUTANEOUS at 13:54

## 2017-01-01 RX ADMIN — DEXAMETHASONE SODIUM PHOSPHATE 4 MG: 4 INJECTION, SOLUTION INTRAMUSCULAR; INTRAVENOUS at 18:32

## 2017-01-01 RX ADMIN — IPRATROPIUM BROMIDE AND ALBUTEROL SULFATE 3 ML: .5; 3 SOLUTION RESPIRATORY (INHALATION) at 19:15

## 2017-01-01 RX ADMIN — FAMOTIDINE 20 MG: 20 TABLET ORAL at 08:16

## 2017-01-01 RX ADMIN — MULTIPLE VITAMINS W/ MINERALS TAB 1 TABLET: TAB at 09:36

## 2017-01-01 RX ADMIN — OXYCODONE HYDROCHLORIDE AND ACETAMINOPHEN 1 TABLET: 5; 325 TABLET ORAL at 14:03

## 2017-01-01 RX ADMIN — IPRATROPIUM BROMIDE AND ALBUTEROL SULFATE 3 ML: .5; 3 SOLUTION RESPIRATORY (INHALATION) at 13:27

## 2017-01-01 RX ADMIN — DOCUSATE SODIUM 100 MG: 100 CAPSULE, LIQUID FILLED ORAL at 09:00

## 2017-01-01 RX ADMIN — FAMOTIDINE 20 MG: 20 TABLET ORAL at 17:58

## 2017-01-01 RX ADMIN — INSULIN LISPRO 2 UNITS: 100 INJECTION, SOLUTION INTRAVENOUS; SUBCUTANEOUS at 17:30

## 2017-01-01 RX ADMIN — SODIUM CHLORIDE 500 ML: 900 INJECTION, SOLUTION INTRAVENOUS at 21:54

## 2017-01-01 RX ADMIN — ENOXAPARIN SODIUM 40 MG: 40 INJECTION SUBCUTANEOUS at 09:02

## 2017-01-01 RX ADMIN — OXYCODONE HYDROCHLORIDE AND ACETAMINOPHEN 1 TABLET: 5; 325 TABLET ORAL at 23:09

## 2017-01-01 RX ADMIN — Medication 10 ML: at 06:28

## 2017-01-01 RX ADMIN — INSULIN LISPRO 2 UNITS: 100 INJECTION, SOLUTION INTRAVENOUS; SUBCUTANEOUS at 16:30

## 2017-01-01 RX ADMIN — IPRATROPIUM BROMIDE AND ALBUTEROL SULFATE 3 ML: .5; 3 SOLUTION RESPIRATORY (INHALATION) at 20:21

## 2017-01-01 RX ADMIN — OXYCODONE HYDROCHLORIDE AND ACETAMINOPHEN 1 TABLET: 5; 325 TABLET ORAL at 12:05

## 2017-01-01 RX ADMIN — FAMOTIDINE 20 MG: 20 TABLET ORAL at 09:57

## 2017-01-01 RX ADMIN — CEFTRIAXONE SODIUM 1 G: 1 INJECTION, POWDER, FOR SOLUTION INTRAMUSCULAR; INTRAVENOUS at 02:22

## 2017-01-01 RX ADMIN — FAMOTIDINE 20 MG: 20 TABLET ORAL at 10:23

## 2017-01-01 RX ADMIN — IPRATROPIUM BROMIDE AND ALBUTEROL SULFATE 3 ML: .5; 3 SOLUTION RESPIRATORY (INHALATION) at 09:15

## 2017-01-01 RX ADMIN — Medication: at 11:30

## 2017-01-01 RX ADMIN — PIPERACILLIN AND TAZOBACTAM 3.38 G: 3; .375 INJECTION, POWDER, FOR SOLUTION INTRAVENOUS at 00:46

## 2017-01-01 RX ADMIN — SODIUM CHLORIDE 1000 MG: 900 INJECTION, SOLUTION INTRAVENOUS at 06:44

## 2017-01-01 RX ADMIN — DEXAMETHASONE SODIUM PHOSPHATE 4 MG: 4 INJECTION, SOLUTION INTRAMUSCULAR; INTRAVENOUS at 14:05

## 2017-01-01 RX ADMIN — MULTIPLE VITAMINS W/ MINERALS TAB 1 TABLET: TAB at 08:16

## 2017-01-01 RX ADMIN — DOCUSATE SODIUM 100 MG: 100 CAPSULE, LIQUID FILLED ORAL at 17:30

## 2017-01-01 RX ADMIN — IPRATROPIUM BROMIDE AND ALBUTEROL SULFATE 3 ML: .5; 3 SOLUTION RESPIRATORY (INHALATION) at 20:20

## 2017-01-01 RX ADMIN — PIPERACILLIN AND TAZOBACTAM 3.38 G: 3; .375 INJECTION, POWDER, FOR SOLUTION INTRAVENOUS at 18:33

## 2017-01-01 RX ADMIN — Medication 10 ML: at 00:47

## 2017-01-01 RX ADMIN — HEPARIN SODIUM AND DEXTROSE 15 UNITS/KG/HR: 10000; 5 INJECTION INTRAVENOUS at 07:46

## 2017-01-01 RX ADMIN — DEXAMETHASONE SODIUM PHOSPHATE 4 MG: 4 INJECTION, SOLUTION INTRAMUSCULAR; INTRAVENOUS at 03:20

## 2017-01-01 RX ADMIN — OXYCODONE HYDROCHLORIDE AND ACETAMINOPHEN 1 TABLET: 5; 325 TABLET ORAL at 03:57

## 2017-01-01 RX ADMIN — PIPERACILLIN AND TAZOBACTAM 3.38 G: 3; .375 INJECTION, POWDER, FOR SOLUTION INTRAVENOUS at 09:57

## 2017-01-01 RX ADMIN — FAMOTIDINE 20 MG: 20 TABLET ORAL at 11:07

## 2017-01-01 RX ADMIN — MULTIPLE VITAMINS W/ MINERALS TAB 1 TABLET: TAB at 08:30

## 2017-01-01 RX ADMIN — OXYCODONE HYDROCHLORIDE AND ACETAMINOPHEN 2 TABLET: 5; 325 TABLET ORAL at 06:01

## 2017-01-01 RX ADMIN — SODIUM CHLORIDE 75 ML/HR: 900 INJECTION, SOLUTION INTRAVENOUS at 23:35

## 2017-01-01 RX ADMIN — DEXAMETHASONE SODIUM PHOSPHATE 4 MG: 4 INJECTION, SOLUTION INTRAMUSCULAR; INTRAVENOUS at 11:08

## 2017-01-01 RX ADMIN — ENOXAPARIN SODIUM 80 MG: 80 INJECTION SUBCUTANEOUS at 18:22

## 2017-01-01 RX ADMIN — PIPERACILLIN AND TAZOBACTAM 3.38 G: 3; .375 INJECTION, POWDER, FOR SOLUTION INTRAVENOUS at 23:22

## 2017-01-01 RX ADMIN — DEXAMETHASONE SODIUM PHOSPHATE 4 MG: 4 INJECTION, SOLUTION INTRAMUSCULAR; INTRAVENOUS at 00:14

## 2017-01-01 RX ADMIN — INSULIN LISPRO 2 UNITS: 100 INJECTION, SOLUTION INTRAVENOUS; SUBCUTANEOUS at 12:31

## 2017-01-01 RX ADMIN — DEXAMETHASONE SODIUM PHOSPHATE 4 MG: 4 INJECTION, SOLUTION INTRAMUSCULAR; INTRAVENOUS at 20:38

## 2017-01-01 RX ADMIN — PIPERACILLIN AND TAZOBACTAM 3.38 G: 3; .375 INJECTION, POWDER, FOR SOLUTION INTRAVENOUS at 10:00

## 2017-01-01 RX ADMIN — IPRATROPIUM BROMIDE AND ALBUTEROL SULFATE 3 ML: .5; 3 SOLUTION RESPIRATORY (INHALATION) at 08:22

## 2017-01-01 RX ADMIN — FAMOTIDINE 20 MG: 20 TABLET ORAL at 09:42

## 2017-01-01 RX ADMIN — ENOXAPARIN SODIUM 130 MG: 150 INJECTION, SOLUTION INTRAVENOUS; SUBCUTANEOUS at 15:00

## 2017-01-01 RX ADMIN — SODIUM CHLORIDE 3000 ML: 900 INJECTION, SOLUTION INTRAVENOUS at 01:03

## 2017-01-01 RX ADMIN — OXYCODONE HYDROCHLORIDE AND ACETAMINOPHEN 1 TABLET: 5; 325 TABLET ORAL at 08:03

## 2017-01-01 RX ADMIN — ACETAMINOPHEN 650 MG: 650 SUPPOSITORY RECTAL at 17:08

## 2017-01-01 RX ADMIN — Medication 10 ML: at 08:03

## 2017-01-01 RX ADMIN — Medication: at 09:00

## 2017-01-01 RX ADMIN — HEPARIN SODIUM AND DEXTROSE 20.95 UNITS/KG/HR: 10000; 5 INJECTION INTRAVENOUS at 17:12

## 2017-01-01 RX ADMIN — MORPHINE SULFATE 10 MG: 20 SOLUTION ORAL at 14:18

## 2017-01-01 RX ADMIN — DEXAMETHASONE 4 MG: 4 TABLET ORAL at 22:08

## 2017-01-01 RX ADMIN — INSULIN LISPRO 2 UNITS: 100 INJECTION, SOLUTION INTRAVENOUS; SUBCUTANEOUS at 22:17

## 2017-01-01 RX ADMIN — OXYCODONE HYDROCHLORIDE AND ACETAMINOPHEN 2 TABLET: 5; 325 TABLET ORAL at 20:02

## 2017-01-01 RX ADMIN — LORAZEPAM 1 MG: 1 TABLET ORAL at 20:56

## 2017-01-01 RX ADMIN — OXYCODONE HYDROCHLORIDE AND ACETAMINOPHEN 1 TABLET: 5; 325 TABLET ORAL at 12:48

## 2017-01-01 RX ADMIN — DEXAMETHASONE SODIUM PHOSPHATE 4 MG: 4 INJECTION, SOLUTION INTRAMUSCULAR; INTRAVENOUS at 08:03

## 2017-01-01 RX ADMIN — OXYCODONE HYDROCHLORIDE AND ACETAMINOPHEN 1 TABLET: 5; 325 TABLET ORAL at 23:24

## 2017-01-01 RX ADMIN — Medication: at 18:00

## 2017-01-01 RX ADMIN — MULTIPLE VITAMINS W/ MINERALS TAB 1 TABLET: TAB at 09:42

## 2017-01-01 RX ADMIN — DEXAMETHASONE SODIUM PHOSPHATE 4 MG: 4 INJECTION, SOLUTION INTRAMUSCULAR; INTRAVENOUS at 18:00

## 2017-01-01 RX ADMIN — DEXAMETHASONE SODIUM PHOSPHATE 4 MG: 4 INJECTION, SOLUTION INTRAMUSCULAR; INTRAVENOUS at 17:58

## 2017-01-01 RX ADMIN — Medication 10 ML: at 06:02

## 2017-01-01 RX ADMIN — DEXAMETHASONE SODIUM PHOSPHATE 4 MG: 4 INJECTION, SOLUTION INTRAMUSCULAR; INTRAVENOUS at 05:41

## 2017-01-01 RX ADMIN — DEXAMETHASONE SODIUM PHOSPHATE 4 MG: 4 INJECTION, SOLUTION INTRAMUSCULAR; INTRAVENOUS at 23:24

## 2017-01-01 RX ADMIN — Medication 10 ML: at 14:00

## 2017-01-01 RX ADMIN — Medication: at 10:30

## 2017-01-01 RX ADMIN — SODIUM CHLORIDE 1000 MG: 900 INJECTION, SOLUTION INTRAVENOUS at 11:00

## 2017-01-01 RX ADMIN — INSULIN LISPRO 2 UNITS: 100 INJECTION, SOLUTION INTRAVENOUS; SUBCUTANEOUS at 06:02

## 2017-01-01 RX ADMIN — IPRATROPIUM BROMIDE AND ALBUTEROL SULFATE 3 ML: .5; 3 SOLUTION RESPIRATORY (INHALATION) at 00:12

## 2017-01-01 RX ADMIN — IRON SUCROSE 300 MG: 20 INJECTION, SOLUTION INTRAVENOUS at 11:05

## 2017-01-01 RX ADMIN — PIPERACILLIN AND TAZOBACTAM 3.38 G: 3; .375 INJECTION, POWDER, FOR SOLUTION INTRAVENOUS at 03:46

## 2017-01-01 RX ADMIN — OXYCODONE HYDROCHLORIDE AND ACETAMINOPHEN 1 TABLET: 5; 325 TABLET ORAL at 08:53

## 2017-01-01 RX ADMIN — DEXAMETHASONE SODIUM PHOSPHATE 4 MG: 4 INJECTION, SOLUTION INTRAMUSCULAR; INTRAVENOUS at 06:45

## 2017-01-01 RX ADMIN — FAMOTIDINE 20 MG: 20 TABLET ORAL at 17:56

## 2017-01-01 RX ADMIN — DEXAMETHASONE SODIUM PHOSPHATE 4 MG: 4 INJECTION, SOLUTION INTRAMUSCULAR; INTRAVENOUS at 12:05

## 2017-01-01 RX ADMIN — DOCUSATE SODIUM 100 MG: 100 CAPSULE, LIQUID FILLED ORAL at 09:42

## 2017-01-01 RX ADMIN — OXYCODONE HYDROCHLORIDE AND ACETAMINOPHEN 1 TABLET: 5; 325 TABLET ORAL at 15:23

## 2017-01-01 RX ADMIN — HEPARIN SODIUM AND DEXTROSE 18 UNITS/KG/HR: 10000; 5 INJECTION INTRAVENOUS at 18:59

## 2017-01-01 RX ADMIN — PIPERACILLIN AND TAZOBACTAM 3.38 G: 3; .375 INJECTION, POWDER, FOR SOLUTION INTRAVENOUS at 23:00

## 2017-01-01 RX ADMIN — DOCUSATE SODIUM 100 MG: 100 CAPSULE, LIQUID FILLED ORAL at 17:34

## 2017-01-01 RX ADMIN — IPRATROPIUM BROMIDE AND ALBUTEROL SULFATE 3 ML: .5; 3 SOLUTION RESPIRATORY (INHALATION) at 19:26

## 2017-01-01 RX ADMIN — PIPERACILLIN AND TAZOBACTAM 3.38 G: 3; .375 INJECTION, POWDER, FOR SOLUTION INTRAVENOUS at 04:19

## 2017-01-01 RX ADMIN — FAMOTIDINE 20 MG: 20 TABLET ORAL at 17:30

## 2017-01-01 RX ADMIN — PIPERACILLIN AND TAZOBACTAM 3.38 G: 3; .375 INJECTION, POWDER, FOR SOLUTION INTRAVENOUS at 03:36

## 2017-01-01 RX ADMIN — Medication: at 23:07

## 2017-01-01 RX ADMIN — Medication 10 ML: at 05:21

## 2017-01-01 RX ADMIN — SODIUM CHLORIDE 150 ML/HR: 900 INJECTION, SOLUTION INTRAVENOUS at 04:56

## 2017-01-01 RX ADMIN — FAMOTIDINE 20 MG: 20 TABLET ORAL at 17:37

## 2017-01-01 RX ADMIN — LORAZEPAM 1 MG: 1 TABLET ORAL at 22:08

## 2017-01-01 RX ADMIN — DEXAMETHASONE SODIUM PHOSPHATE 4 MG: 4 INJECTION, SOLUTION INTRAMUSCULAR; INTRAVENOUS at 23:10

## 2017-01-01 RX ADMIN — INSULIN LISPRO 2 UNITS: 100 INJECTION, SOLUTION INTRAVENOUS; SUBCUTANEOUS at 17:55

## 2017-01-01 RX ADMIN — MULTIPLE VITAMINS W/ MINERALS TAB 1 TABLET: TAB at 10:23

## 2017-01-01 RX ADMIN — INSULIN LISPRO 2 UNITS: 100 INJECTION, SOLUTION INTRAVENOUS; SUBCUTANEOUS at 06:45

## 2017-01-01 RX ADMIN — DEXAMETHASONE SODIUM PHOSPHATE 4 MG: 4 INJECTION, SOLUTION INTRAMUSCULAR; INTRAVENOUS at 08:53

## 2017-01-01 RX ADMIN — FAMOTIDINE 20 MG: 20 TABLET ORAL at 09:35

## 2017-01-01 RX ADMIN — Medication 10 ML: at 06:00

## 2017-01-01 RX ADMIN — OXYCODONE HYDROCHLORIDE AND ACETAMINOPHEN 1 TABLET: 5; 325 TABLET ORAL at 18:25

## 2017-01-01 RX ADMIN — INSULIN LISPRO 2 UNITS: 100 INJECTION, SOLUTION INTRAVENOUS; SUBCUTANEOUS at 08:16

## 2017-01-01 RX ADMIN — OXYCODONE HYDROCHLORIDE AND ACETAMINOPHEN 1 TABLET: 5; 325 TABLET ORAL at 09:41

## 2017-01-01 RX ADMIN — PIPERACILLIN AND TAZOBACTAM 3.38 G: 3; .375 INJECTION, POWDER, FOR SOLUTION INTRAVENOUS at 16:00

## 2017-01-01 RX ADMIN — HYDROMORPHONE HYDROCHLORIDE 0.5 MG: 1 INJECTION, SOLUTION INTRAMUSCULAR; INTRAVENOUS; SUBCUTANEOUS at 21:54

## 2017-01-01 RX ADMIN — OXYCODONE HYDROCHLORIDE AND ACETAMINOPHEN 1 TABLET: 5; 325 TABLET ORAL at 10:22

## 2017-01-01 RX ADMIN — DEXAMETHASONE SODIUM PHOSPHATE 4 MG: 4 INJECTION, SOLUTION INTRAMUSCULAR; INTRAVENOUS at 06:00

## 2017-01-01 RX ADMIN — OXYCODONE HYDROCHLORIDE AND ACETAMINOPHEN 1 TABLET: 5; 325 TABLET ORAL at 06:48

## 2017-01-01 RX ADMIN — OXYCODONE HYDROCHLORIDE AND ACETAMINOPHEN 1 TABLET: 5; 325 TABLET ORAL at 09:29

## 2017-01-01 RX ADMIN — IOPAMIDOL 100 ML: 755 INJECTION, SOLUTION INTRAVENOUS at 17:00

## 2017-01-01 RX ADMIN — OXYCODONE HYDROCHLORIDE AND ACETAMINOPHEN 1 TABLET: 5; 325 TABLET ORAL at 09:56

## 2017-01-01 RX ADMIN — OXYCODONE HYDROCHLORIDE AND ACETAMINOPHEN 1 TABLET: 5; 325 TABLET ORAL at 12:28

## 2017-01-01 RX ADMIN — INSULIN LISPRO 2 UNITS: 100 INJECTION, SOLUTION INTRAVENOUS; SUBCUTANEOUS at 22:09

## 2017-01-01 RX ADMIN — OXYCODONE HYDROCHLORIDE AND ACETAMINOPHEN 1 TABLET: 5; 325 TABLET ORAL at 00:32

## 2017-01-01 RX ADMIN — Medication 10 ML: at 21:59

## 2017-01-01 RX ADMIN — OXYCODONE HYDROCHLORIDE AND ACETAMINOPHEN 1 TABLET: 5; 325 TABLET ORAL at 09:17

## 2017-01-01 RX ADMIN — SODIUM CHLORIDE 75 ML/HR: 900 INJECTION, SOLUTION INTRAVENOUS at 10:06

## 2017-01-01 RX ADMIN — PIPERACILLIN AND TAZOBACTAM 3.38 G: 3; .375 INJECTION, POWDER, FOR SOLUTION INTRAVENOUS at 22:17

## 2017-01-01 RX ADMIN — OXYCODONE HYDROCHLORIDE AND ACETAMINOPHEN 1 TABLET: 5; 325 TABLET ORAL at 01:47

## 2017-01-01 RX ADMIN — FAMOTIDINE 20 MG: 20 TABLET ORAL at 08:30

## 2017-01-01 RX ADMIN — Medication 10 ML: at 17:34

## 2017-01-01 RX ADMIN — Medication 10 ML: at 23:24

## 2017-01-01 RX ADMIN — DOCUSATE SODIUM 100 MG: 100 CAPSULE, LIQUID FILLED ORAL at 18:28

## 2017-01-01 RX ADMIN — DOCUSATE SODIUM 100 MG: 100 CAPSULE, LIQUID FILLED ORAL at 08:30

## 2017-01-01 RX ADMIN — Medication 10 ML: at 06:49

## 2017-01-01 RX ADMIN — SODIUM CHLORIDE 1000 MG: 900 INJECTION, SOLUTION INTRAVENOUS at 21:48

## 2017-01-01 RX ADMIN — ENOXAPARIN SODIUM 130 MG: 150 INJECTION, SOLUTION INTRAVENOUS; SUBCUTANEOUS at 13:31

## 2017-01-01 RX ADMIN — DEXAMETHASONE 4 MG: 4 TABLET ORAL at 09:42

## 2017-01-01 RX ADMIN — ENOXAPARIN SODIUM 130 MG: 150 INJECTION, SOLUTION INTRAVENOUS; SUBCUTANEOUS at 15:22

## 2017-01-01 RX ADMIN — PIPERACILLIN AND TAZOBACTAM 3.38 G: 3; .375 INJECTION, POWDER, FOR SOLUTION INTRAVENOUS at 03:53

## 2017-01-01 RX ADMIN — FAMOTIDINE 20 MG: 20 TABLET ORAL at 09:29

## 2017-01-01 RX ADMIN — IPRATROPIUM BROMIDE AND ALBUTEROL SULFATE 3 ML: .5; 3 SOLUTION RESPIRATORY (INHALATION) at 15:33

## 2017-01-01 RX ADMIN — OXYCODONE HYDROCHLORIDE AND ACETAMINOPHEN 1 TABLET: 5; 325 TABLET ORAL at 23:32

## 2017-01-01 RX ADMIN — MULTIPLE VITAMINS W/ MINERALS TAB 1 TABLET: TAB at 09:00

## 2017-01-01 RX ADMIN — ENOXAPARIN SODIUM 130 MG: 150 INJECTION, SOLUTION INTRAVENOUS; SUBCUTANEOUS at 13:55

## 2017-01-01 RX ADMIN — FAMOTIDINE 20 MG: 20 TABLET ORAL at 18:29

## 2017-01-01 RX ADMIN — DEXAMETHASONE SODIUM PHOSPHATE 4 MG: 4 INJECTION, SOLUTION INTRAMUSCULAR; INTRAVENOUS at 12:00

## 2017-01-01 RX ADMIN — SODIUM CHLORIDE 75 ML/HR: 900 INJECTION, SOLUTION INTRAVENOUS at 07:08

## 2017-01-01 RX ADMIN — DEXAMETHASONE 4 MG: 4 TABLET ORAL at 20:55

## 2017-01-01 RX ADMIN — DEXAMETHASONE SODIUM PHOSPHATE 4 MG: 4 INJECTION, SOLUTION INTRAMUSCULAR; INTRAVENOUS at 13:55

## 2017-01-01 RX ADMIN — PIPERACILLIN AND TAZOBACTAM 3.38 G: 3; .375 INJECTION, POWDER, FOR SOLUTION INTRAVENOUS at 10:15

## 2017-01-01 RX ADMIN — DOCUSATE SODIUM 100 MG: 100 CAPSULE, LIQUID FILLED ORAL at 17:48

## 2017-01-01 RX ADMIN — DOCUSATE SODIUM 100 MG: 100 CAPSULE, LIQUID FILLED ORAL at 09:35

## 2017-01-01 RX ADMIN — LORAZEPAM 1 MG: 1 TABLET ORAL at 21:39

## 2017-01-01 RX ADMIN — SODIUM CHLORIDE 75 ML/HR: 900 INJECTION, SOLUTION INTRAVENOUS at 00:21

## 2017-01-01 RX ADMIN — SODIUM CHLORIDE 75 ML/HR: 900 INJECTION, SOLUTION INTRAVENOUS at 19:02

## 2017-01-01 RX ADMIN — FAMOTIDINE 20 MG: 20 TABLET ORAL at 17:34

## 2017-01-01 RX ADMIN — Medication 10 ML: at 15:23

## 2017-01-01 RX ADMIN — DOCUSATE SODIUM 100 MG: 100 CAPSULE, LIQUID FILLED ORAL at 09:29

## 2017-01-01 RX ADMIN — DOCUSATE SODIUM 100 MG: 100 CAPSULE, LIQUID FILLED ORAL at 18:32

## 2017-01-01 RX ADMIN — IPRATROPIUM BROMIDE AND ALBUTEROL SULFATE 3 ML: .5; 3 SOLUTION RESPIRATORY (INHALATION) at 18:34

## 2017-01-01 RX ADMIN — VANCOMYCIN HYDROCHLORIDE 1750 MG: 10 INJECTION, POWDER, LYOPHILIZED, FOR SOLUTION INTRAVENOUS at 17:08

## 2017-01-01 RX ADMIN — Medication: at 22:17

## 2017-01-01 RX ADMIN — DEXAMETHASONE 4 MG: 4 TABLET ORAL at 09:29

## 2017-01-01 RX ADMIN — Medication 10 ML: at 13:12

## 2017-01-01 RX ADMIN — OXYCODONE HYDROCHLORIDE AND ACETAMINOPHEN 1 TABLET: 5; 325 TABLET ORAL at 13:30

## 2017-01-01 RX ADMIN — DOCUSATE SODIUM 100 MG: 100 CAPSULE, LIQUID FILLED ORAL at 08:58

## 2017-01-01 RX ADMIN — DOCUSATE SODIUM 100 MG: 100 CAPSULE, LIQUID FILLED ORAL at 09:03

## 2017-01-01 RX ADMIN — Medication: at 23:26

## 2017-01-01 RX ADMIN — PIPERACILLIN AND TAZOBACTAM 3.38 G: 3; .375 INJECTION, POWDER, FOR SOLUTION INTRAVENOUS at 15:54

## 2017-01-01 RX ADMIN — PIPERACILLIN AND TAZOBACTAM 3.38 G: 3; .375 INJECTION, POWDER, FOR SOLUTION INTRAVENOUS at 17:57

## 2017-01-01 RX ADMIN — WARFARIN SODIUM 7.5 MG: 7.5 TABLET ORAL at 17:34

## 2017-01-01 RX ADMIN — DOCUSATE SODIUM 100 MG: 100 CAPSULE, LIQUID FILLED ORAL at 08:16

## 2017-01-01 RX ADMIN — HEPARIN SODIUM AND DEXTROSE 15 UNITS/KG/HR: 10000; 5 INJECTION INTRAVENOUS at 13:24

## 2017-01-01 RX ADMIN — OXYCODONE HYDROCHLORIDE AND ACETAMINOPHEN 1 TABLET: 5; 325 TABLET ORAL at 03:46

## 2017-01-01 RX ADMIN — Medication: at 22:12

## 2017-01-01 RX ADMIN — DEXAMETHASONE SODIUM PHOSPHATE 4 MG: 4 INJECTION, SOLUTION INTRAMUSCULAR; INTRAVENOUS at 00:47

## 2017-01-01 RX ADMIN — DOCUSATE SODIUM 100 MG: 100 CAPSULE, LIQUID FILLED ORAL at 18:00

## 2017-01-01 RX ADMIN — DOCUSATE SODIUM 100 MG: 100 CAPSULE, LIQUID FILLED ORAL at 18:01

## 2017-01-01 RX ADMIN — Medication: at 22:00

## 2017-01-01 RX ADMIN — OXYCODONE HYDROCHLORIDE AND ACETAMINOPHEN 1 TABLET: 5; 325 TABLET ORAL at 17:34

## 2017-01-01 RX ADMIN — OXYCODONE HYDROCHLORIDE AND ACETAMINOPHEN 1 TABLET: 5; 325 TABLET ORAL at 02:16

## 2017-01-01 RX ADMIN — DEXAMETHASONE SODIUM PHOSPHATE 4 MG: 4 INJECTION, SOLUTION INTRAMUSCULAR; INTRAVENOUS at 13:30

## 2017-01-01 RX ADMIN — DOCUSATE SODIUM 100 MG: 100 CAPSULE, LIQUID FILLED ORAL at 17:56

## 2017-01-01 RX ADMIN — DEXAMETHASONE SODIUM PHOSPHATE 4 MG: 4 INJECTION, SOLUTION INTRAMUSCULAR; INTRAVENOUS at 13:54

## 2017-01-01 RX ADMIN — MULTIPLE VITAMINS W/ MINERALS TAB 1 TABLET: TAB at 11:07

## 2017-01-01 RX ADMIN — DEXAMETHASONE SODIUM PHOSPHATE 4 MG: 4 INJECTION, SOLUTION INTRAMUSCULAR; INTRAVENOUS at 23:55

## 2017-01-01 RX ADMIN — MULTIPLE VITAMINS W/ MINERALS TAB 1 TABLET: TAB at 09:03

## 2017-01-01 RX ADMIN — Medication 10 ML: at 21:57

## 2017-01-01 RX ADMIN — INSULIN LISPRO 2 UNITS: 100 INJECTION, SOLUTION INTRAVENOUS; SUBCUTANEOUS at 11:41

## 2017-01-01 RX ADMIN — IPRATROPIUM BROMIDE AND ALBUTEROL SULFATE 3 ML: .5; 3 SOLUTION RESPIRATORY (INHALATION) at 12:44

## 2017-01-01 RX ADMIN — OXYCODONE HYDROCHLORIDE AND ACETAMINOPHEN 1 TABLET: 5; 325 TABLET ORAL at 06:55

## 2017-01-01 RX ADMIN — FAMOTIDINE 20 MG: 20 TABLET ORAL at 09:43

## 2017-01-01 RX ADMIN — IOPAMIDOL 80 ML: 612 INJECTION, SOLUTION INTRAVENOUS at 13:00

## 2017-01-01 RX ADMIN — IPRATROPIUM BROMIDE AND ALBUTEROL SULFATE 3 ML: .5; 3 SOLUTION RESPIRATORY (INHALATION) at 12:29

## 2017-01-01 RX ADMIN — IPRATROPIUM BROMIDE AND ALBUTEROL SULFATE 3 ML: .5; 3 SOLUTION RESPIRATORY (INHALATION) at 17:45

## 2017-01-01 RX ADMIN — SODIUM CHLORIDE 1000 MG: 900 INJECTION, SOLUTION INTRAVENOUS at 12:00

## 2017-01-01 RX ADMIN — PHYTONADIONE 10 MG: 10 INJECTION, EMULSION INTRAMUSCULAR; INTRAVENOUS; SUBCUTANEOUS at 09:55

## 2017-01-01 RX ADMIN — OXYCODONE HYDROCHLORIDE AND ACETAMINOPHEN 1 TABLET: 5; 325 TABLET ORAL at 11:07

## 2017-01-01 RX ADMIN — FAMOTIDINE 20 MG: 20 TABLET ORAL at 08:53

## 2017-01-01 RX ADMIN — FAMOTIDINE 20 MG: 20 TABLET ORAL at 09:03

## 2017-01-01 RX ADMIN — DEXAMETHASONE SODIUM PHOSPHATE 4 MG: 4 INJECTION, SOLUTION INTRAMUSCULAR; INTRAVENOUS at 21:54

## 2017-01-01 RX ADMIN — FAMOTIDINE 20 MG: 20 TABLET ORAL at 08:58

## 2017-01-01 RX ADMIN — LORAZEPAM 1 MG: 2 INJECTION INTRAMUSCULAR; INTRAVENOUS at 11:58

## 2017-01-01 RX ADMIN — DEXAMETHASONE SODIUM PHOSPHATE 4 MG: 4 INJECTION, SOLUTION INTRAMUSCULAR; INTRAVENOUS at 05:16

## 2017-01-01 RX ADMIN — FAMOTIDINE 20 MG: 20 TABLET ORAL at 18:34

## 2017-01-01 RX ADMIN — DOCUSATE SODIUM 100 MG: 100 CAPSULE, LIQUID FILLED ORAL at 17:58

## 2017-01-01 RX ADMIN — DEXAMETHASONE 4 MG: 4 TABLET ORAL at 20:02

## 2017-01-01 RX ADMIN — DEXAMETHASONE SODIUM PHOSPHATE 4 MG: 4 INJECTION, SOLUTION INTRAMUSCULAR; INTRAVENOUS at 17:48

## 2017-01-01 RX ADMIN — DEXAMETHASONE SODIUM PHOSPHATE 4 MG: 4 INJECTION, SOLUTION INTRAMUSCULAR; INTRAVENOUS at 03:33

## 2017-01-01 RX ADMIN — DEXAMETHASONE SODIUM PHOSPHATE 4 MG: 4 INJECTION, SOLUTION INTRAMUSCULAR; INTRAVENOUS at 01:45

## 2017-01-01 RX ADMIN — OXYCODONE HYDROCHLORIDE AND ACETAMINOPHEN 1 TABLET: 5; 325 TABLET ORAL at 13:11

## 2017-01-01 RX ADMIN — DOCUSATE SODIUM 100 MG: 100 CAPSULE, LIQUID FILLED ORAL at 09:56

## 2017-01-01 RX ADMIN — DEXAMETHASONE SODIUM PHOSPHATE 4 MG: 4 INJECTION, SOLUTION INTRAMUSCULAR; INTRAVENOUS at 18:33

## 2017-01-01 RX ADMIN — FAMOTIDINE 20 MG: 20 TABLET ORAL at 18:33

## 2017-01-01 RX ADMIN — ENOXAPARIN SODIUM 130 MG: 150 INJECTION, SOLUTION INTRAVENOUS; SUBCUTANEOUS at 13:30

## 2017-01-01 RX ADMIN — DIATRIZOATE MEGLUMINE AND DIATRIZOATE SODIUM 30 ML: 600; 100 SOLUTION ORAL; RECTAL at 09:00

## 2017-01-01 RX ADMIN — DEXAMETHASONE SODIUM PHOSPHATE 4 MG: 4 INJECTION, SOLUTION INTRAMUSCULAR; INTRAVENOUS at 21:56

## 2017-01-01 RX ADMIN — OXYCODONE HYDROCHLORIDE AND ACETAMINOPHEN 1 TABLET: 5; 325 TABLET ORAL at 21:12

## 2017-01-01 RX ADMIN — DEXAMETHASONE SODIUM PHOSPHATE 4 MG: 4 INJECTION, SOLUTION INTRAMUSCULAR; INTRAVENOUS at 21:57

## 2017-01-01 RX ADMIN — DOCUSATE SODIUM 100 MG: 100 CAPSULE, LIQUID FILLED ORAL at 18:07

## 2017-01-01 RX ADMIN — DEXAMETHASONE SODIUM PHOSPHATE 4 MG: 4 INJECTION, SOLUTION INTRAMUSCULAR; INTRAVENOUS at 13:50

## 2017-01-01 RX ADMIN — OXYCODONE HYDROCHLORIDE AND ACETAMINOPHEN 1 TABLET: 5; 325 TABLET ORAL at 21:00

## 2017-01-01 RX ADMIN — INSULIN LISPRO 4 UNITS: 100 INJECTION, SOLUTION INTRAVENOUS; SUBCUTANEOUS at 20:54

## 2017-01-01 RX ADMIN — INSULIN LISPRO 2 UNITS: 100 INJECTION, SOLUTION INTRAVENOUS; SUBCUTANEOUS at 21:39

## 2017-01-01 RX ADMIN — Medication 10 ML: at 13:31

## 2017-01-01 RX ADMIN — MULTIPLE VITAMINS W/ MINERALS TAB 1 TABLET: TAB at 08:53

## 2017-01-01 RX ADMIN — DEXAMETHASONE SODIUM PHOSPHATE 4 MG: 4 INJECTION, SOLUTION INTRAMUSCULAR; INTRAVENOUS at 06:27

## 2017-01-01 RX ADMIN — DOCUSATE SODIUM 100 MG: 100 CAPSULE, LIQUID FILLED ORAL at 11:07

## 2017-01-01 RX ADMIN — FAMOTIDINE 20 MG: 20 TABLET ORAL at 18:01

## 2017-01-01 RX ADMIN — FAMOTIDINE 20 MG: 20 TABLET ORAL at 18:07

## 2017-01-01 RX ADMIN — IRON SUCROSE 300 MG: 20 INJECTION, SOLUTION INTRAVENOUS at 10:00

## 2017-01-01 RX ADMIN — OXYCODONE HYDROCHLORIDE AND ACETAMINOPHEN 1 TABLET: 5; 325 TABLET ORAL at 18:06

## 2017-01-01 RX ADMIN — ENOXAPARIN SODIUM 40 MG: 40 INJECTION SUBCUTANEOUS at 14:05

## 2017-01-01 RX ADMIN — DOCUSATE SODIUM 100 MG: 100 CAPSULE, LIQUID FILLED ORAL at 09:43

## 2017-01-01 RX ADMIN — MULTIPLE VITAMINS W/ MINERALS TAB 1 TABLET: TAB at 09:29

## 2017-01-01 RX ADMIN — IPRATROPIUM BROMIDE AND ALBUTEROL SULFATE 3 ML: .5; 3 SOLUTION RESPIRATORY (INHALATION) at 04:26

## 2017-01-01 RX ADMIN — GADOBUTROL 9 ML: 604.72 INJECTION INTRAVENOUS at 23:22

## 2017-01-01 RX ADMIN — Medication: at 10:38

## 2017-01-01 RX ADMIN — OXYCODONE HYDROCHLORIDE AND ACETAMINOPHEN 1 TABLET: 5; 325 TABLET ORAL at 20:38

## 2017-01-01 RX ADMIN — DEXAMETHASONE SODIUM PHOSPHATE 4 MG: 4 INJECTION, SOLUTION INTRAMUSCULAR; INTRAVENOUS at 13:17

## 2017-01-01 RX ADMIN — HEPARIN SODIUM AND DEXTROSE 17 UNITS/KG/HR: 10000; 5 INJECTION INTRAVENOUS at 03:34

## 2017-01-01 RX ADMIN — Medication: at 11:08

## 2017-01-16 NOTE — DISCHARGE INSTRUCTIONS
SPECIFIC PATIENT INSTRUCTIONS FROM THE PHYSICIAN WHO TREATED YOU IN THE ER TODAY:  1. Ibuprofen and flexeril as prescribed until finished. 2. Return if any concerns or worsening condition(s). Return here for increasing pain, change in pain, numbness or tingling in arms and legs. 3. FOLLOW UP APPOINTMENT:  Your primary doctor in 4-5 days for reevaluation. Neck Strain: Care Instructions  Your Care Instructions  You have strained the muscles and ligaments in your neck. A sudden, awkward movement can strain the neck. This often occurs with falls or car accidents or during certain sports. Everyday activities like working on a computer or sleeping can also cause neck strain if they force you to hold your neck in an awkward position for a long time. It is common for neck pain to get worse for a day or two after an injury, but it should start to feel better after that. You may have more pain and stiffness for several days before it gets better. This is expected. It may take a few weeks or longer for it to heal completely. Good home treatment can help you get better faster and avoid future neck problems. Follow-up care is a key part of your treatment and safety. Be sure to make and go to all appointments, and call your doctor if you are having problems. It's also a good idea to know your test results and keep a list of the medicines you take. How can you care for yourself at home? · If you were given a neck brace (cervical collar) to limit neck motion, wear it as instructed for as many days as your doctor tells you to. Do not wear it longer than you were told to. Wearing a brace for too long can make neck stiffness worse and weaken the neck muscles. · You can try using heat or ice to see if it helps. ¨ Try using a heating pad on a low or medium setting for 15 to 20 minutes every 2 to 3 hours. Try a warm shower in place of one session with the heating pad.  You can also buy single-use heat wraps that last up to 8 hours. ¨ You can also try an ice pack for 10 to 15 minutes every 2 to 3 hours. · Take pain medicines exactly as directed. ¨ If the doctor gave you a prescription medicine for pain, take it as prescribed. ¨ If you are not taking a prescription pain medicine, ask your doctor if you can take an over-the-counter medicine. · Gently rub the area to relieve pain and help with blood flow. Do not massage the area if it hurts to do so. · Do not do anything that makes the pain worse. Take it easy for a couple of days. You can do your usual activities if they do not hurt your neck or put it at risk for more stress or injury. · Try sleeping on a special neck pillow. Place it under your neck, not under your head. Placing a tightly rolled-up towel under your neck while you sleep will also work. If you use a neck pillow or rolled towel, do not use your regular pillow at the same time. · To prevent future neck pain, do exercises to stretch and strengthen your neck and back. Learn how to use good posture, safe lifting techniques, and proper body mechanics. When should you call for help? Call 911 anytime you think you may need emergency care. For example, call if:  · You are unable to move an arm or a leg at all. Call your doctor now or seek immediate medical care if:  · You have new or worse symptoms in your arms, legs, chest, belly, or buttocks. Symptoms may include:  ¨ Numbness or tingling. ¨ Weakness. ¨ Pain. · You lose bladder or bowel control. Watch closely for changes in your health, and be sure to contact your doctor if:  · You are not getting better as expected. Where can you learn more? Go to http://denny-marielena.info/. Enter M253 in the search box to learn more about \"Neck Strain: Care Instructions. \"  Current as of: May 23, 2016  Content Version: 11.1  © 7799-5557 [employer].  Care instructions adapted under license by [employer] (which disclaims liability or warranty for this information). If you have questions about a medical condition or this instruction, always ask your healthcare professional. [address] any warranty or liability for your use of this information. "[employer] Activation    Thank you for requesting access to "[employer]. Please follow the instructions below to securely access and download your online medical record. "[employer] allows you to send messages to your doctor, view your test results, renew your prescriptions, schedule appointments, and more. How Do I Sign Up? 1. In your internet browser, go to [email or web address]. 2. Click on the First Time User? Click Here link in the Sign In box. You will see the New Member Sign Up page. 3. Enter your "[employer] Access Code exactly as it appears below. You will not need to use this code after youve completed the sign-up process. If you do not sign up before the expiration date, you must request a new code. "[employer] Access Code: [email or web address]  Expires: 2017  4:02 PM (This is the date your "[employer] access code will )    4. Enter the last four digits of your Social Security Number (xxxx) and Date of Birth (mm/dd/yyyy) as indicated and click Submit. You will be taken to the next sign-up page. 5. Create a "[employer] ID. This will be your "[employer] login ID and cannot be changed, so think of one that is secure and easy to remember. 6. Create a "[employer] password. You can change your password at any time. 7. Enter your Password Reset Question and Answer. This can be used at a later time if you forget your password. 8. Enter your e-mail address. You will receive e-mail notification when new information is available in [address] 9. Click Sign Up. You can now view and download portions of your medical record. 10. Click the Download Summary menu link to download a portable copy of your medical information.     Additional Information    If you have questions, please visit the Frequently Asked Questions section of the [employer] website at [email or web address] Remember, [employer] is NOT to be used for urgent needs. For medical emergencies, dial 911.

## 2017-01-16 NOTE — ED PROVIDER NOTES
[facility] Lat[facility] EMERGENCY DEPT      68 y.o. female with noted past medical history who presents to the emergency department c/o neck pain. Pt states that the pain started 4 days ago and has progressively gotten worse. Pt has taken Tylenol w/ no relief. No other complaints. No current facility-administered medications for this encounter. Current Outpatient Prescriptions   Medication Sig    ibuprofen (MOTRIN) 800 mg tablet Take 1 Tab by mouth every eight (8) hours for 5 days.  cyclobenzaprine (FLEXERIL) 5 mg tablet Take 2 Tabs by mouth three (3) times daily. No past medical history on file. No past surgical history on file. No family history on file. Social History     Social History    Marital status: UNKNOWN     Spouse name: N/A    Number of children: N/A    Years of education: N/A     Occupational History    Not on file. Social History Main Topics    Smoking status: Not on file    Smokeless tobacco: Not on file    Alcohol use Not on file    Drug use: Not on file    Sexual activity: Not on file     Other Topics Concern    Not on file     Social History Narrative       Allergies   Allergen Reactions    Morphine Nausea and Vomiting       Patient's primary care provider (as noted in EPIC):  Elis Shukla MD    REVIEW OF SYSTEMS:    Constitutional:  Negative for diaphoresis. HENT:  Negative for congestion. Respiratory:  Negative for cough and shortness of breath. Cardiovascular:  Negative for chest pain and palpitations. Gastrointestinal:  Negative for diarrhea. Genitourinary:  Negative for flank pain. Musculoskeletal:  Negative for back pain. Skin:  Negative for pallor. Neurological:  Negative for weakness.      Visit Vitals    /69 (BP 1 Location: Left arm, BP Patient Position: At rest)    Pulse (!) 102    Temp 98.1 °F (36.7 °C)    Resp 20    Ht 5' 4\" (1.626 m)    Wt 90.7 kg (200 lb)    SpO2 96%    BMI 34.33 kg/m2       PHYSICAL EXAM:    CONSTITUTIONAL:  Alert, in no apparent distress;  well developed;  well nourished. HEAD:  Normocephalic, atraumatic. EYES:  EOMI. Non-icteric sclera. Normal conjunctiva. ENTM:  Nose:  no rhinorrhea. Throat:  no erythema or exudate, mucous membranes moist.    NECK:   Bilateral lower paracervical neck focal mild reproducible tenderness to palpation with noted muscle spasm. .  No rash, lesions, bruising. No JVD. Supple. RESPIRATORY:  Chest clear, equal breath sounds, good air movement. CARDIOVASCULAR:  Regular rate and rhythm. No murmurs, rubs, or gallops. GI:  Normal bowel sounds, abdomen soft and non-tender. No rebound or guarding. BACK:  Grossly normal exam.   UPPER EXT:  Normal inspection. LOWER EXT:  No edema, no calf tenderness. Distal pulses intact. NEURO:  Moves all four extremities, and grossly normal motor exam.  SKIN:  No rashes;  Normal for age. PSYCH:  Alert and normal affect. DIFFERENTIAL DIAGNOSES/ MEDICAL DECISION MAKING:  Neck myofascial strain, spasm, neuropathy pain to include nerve impingement, cervical spine pathology such as spinal stenosis and/or a combination of the aforementioned. Abnormal lab results from this emergency department encounter:  Labs Reviewed - No data to display    Lab values for this patient within approximately the last 12 hours:  No results found for this or any previous visit (from the past 12 hour(s)). Radiologist and cardiologist interpretations if available at time of this note:  No orders to display       Medication(s) ordered for patient during this emergency visit encounter:  Medications - No data to display    ED COURSE:      IMPRESSION AND MEDICAL DECISION MAKING:  Based upon the patients presentation with noted HPI and PE, along with the work up done in the emergency department, I believe that the patient is having noted myofascial strain. DIAGNOSIS:  1.  Neck myofascial strain    SPECIFIC PATIENT INSTRUCTIONS FROM THE PHYSICIAN WHO TREATED YOU IN THE ER TODAY:  1. Ibuprofen and flexeril as prescribed until finished. 2. Return if any concerns or worsening condition(s). Return here for increasing pain, change in pain, numbness or tingling in arms and legs. 3. FOLLOW UP APPOINTMENT:  Your primary doctor in 4-5 days for reevaluation. RONN Styles Board Certified Emergency Physician    Provider Attestation:  If a scribe was utilized in generation of this patient record, I personally performed the services described in the documentation, reviewed the documentation, as recorded by the scribe in my presence, and it accurately records the patient's history of presenting illness, review of systems, patient physical examination, and procedures performed by me as the attending physician. RONN Styles Board Certified Emergency Physician  1/16/2017.  4:01 PM      Scribe Attestation:   Marcela Aguirre am scribing for and in the presence of Iram Gonsalves MD on this day 01/16/17 at 4:06 PM   christiano Pond    Provider Attestation:  I personally performed the services described in the documentation, reviewed the documentation, as recorded by the scribe in my presence, and it accurately and completely records my words and actions.   Iram Gonsalves MD. 4:06 PM      Signed by: Christiano Pond, 4:06 PM

## 2017-01-16 NOTE — ED NOTES
Patient refused to sign discharge paperwork stating \"I want to be admitted to the hospital\". Patient explained her discharge summary, prescriptions, and follow up plan and refused all. Patient offered assistance with finding a ride but refused same. Patient asking for narcotic pain relievers and explained medications. Patient escorted to waiting area to call for ride and told to ask for nurse if assistance was needed.

## 2017-01-17 PROBLEM — R22.1 NECK MASS: Status: ACTIVE | Noted: 2017-01-01

## 2017-01-17 NOTE — ED TRIAGE NOTES
Headache x 1 week that's getting worse. Was seen here and treated but is unable to fill prescription due to her pain.

## 2017-01-17 NOTE — ED PROVIDER NOTES
HPI Comments: 6:08 PM Lauren Seay is a 68 y.o. female who presents to the ED c/o neck pain onset one week ago that is progressively worsening. Denies injury or trauma, fever, chills, visual changes, N/V, and any other pertinent sx. Denies leg or arm weakness or numbness. Patient was seen here yesterday for the same sx, and at that time she was prescribed pain medications. States that she was unable to fill her prescriptions due to being in too much pain. No further complaints at this time. The history is provided by the patient. No past medical history on file. No past surgical history on file. No family history on file. Social History     Social History    Marital status: UNKNOWN     Spouse name: N/A    Number of children: N/A    Years of education: N/A     Occupational History    Not on file. Social History Main Topics    Smoking status: Not on file    Smokeless tobacco: Not on file    Alcohol use Not on file    Drug use: Not on file    Sexual activity: Not on file     Other Topics Concern    Not on file     Social History Narrative         ALLERGIES: Morphine    Review of Systems   Constitutional: Negative for chills and fever. HENT: Negative for congestion and rhinorrhea. Eyes: Negative for visual disturbance. Respiratory: Negative for cough and shortness of breath. Cardiovascular: Negative for chest pain and leg swelling. Gastrointestinal: Negative for abdominal pain, nausea and vomiting. Genitourinary: Negative for dysuria and hematuria. Musculoskeletal: Positive for neck pain. Negative for arthralgias, back pain and myalgias. Skin: Negative for rash. Neurological: Positive for headaches. Negative for light-headedness. Psychiatric/Behavioral: Negative for confusion and hallucinations. All other systems reviewed and are negative.       Vitals:    01/17/17 1713   BP: 132/74   Pulse: (!) 104   Resp: 16   Temp: 97.6 °F (36.4 °C)   SpO2: 95%   Weight: 90.7 kg (200 lb)   Height: 5' 3\" (1.6 m)            Physical Exam   Constitutional: She is oriented to person, place, and time. She appears well-developed and well-nourished. No distress. Patient is sitting in the wheelchair leaning forward   Uncooperative with exam    HENT:   Head: Normocephalic and atraumatic. Mouth/Throat: Oropharynx is clear and moist.   No tenderness over the temples   Eyes: Conjunctivae and EOM are normal. Pupils are equal, round, and reactive to light. No scleral icterus. Neck: Trachea normal. Neck supple. No JVD present. No thyromegaly present. Limited ROM of the neck, will not actively range neck. Holds neck in a flexed position. Cardiovascular: Normal rate, regular rhythm, S1 normal and S2 normal.  Exam reveals no gallop and no friction rub. No murmur heard. Pulmonary/Chest: Effort normal and breath sounds normal. No accessory muscle usage. No respiratory distress. Abdominal: Soft. Normal appearance. She exhibits no distension. There is no tenderness. There is no rigidity, no rebound and no guarding. Musculoskeletal: She exhibits no edema. Tenderness to the R paraspinal muscles   No midline tenderness, step off, or deformity      Neurological: She is alert and oriented to person, place, and time. She has normal strength. No cranial nerve deficit or sensory deficit. Coordination normal.   Strength and sensation equal in all 4 extremities   Skin: Skin is warm and intact. No rash noted. Psychiatric: She has a normal mood and affect. Her speech is normal and behavior is normal.   Vitals reviewed. MDM  Number of Diagnoses or Management Options  Headache, unspecified headache type:   Neck pain:   Diagnosis management comments: Paula Rajput is a 68 y.o. Female coming in with right lateral neck pain. Will not range her neck hardly at all on eval. CT ordered do to difficult exam and shows destructive bone lesions of C2-C4.  When discussing this and potential etiologies with the patient she mentioned that she has a large breast mass that she first noticed in the spring and did not tell anyone previously out of the fear that this is cancer. 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 Breast was examined which showed a large left fungating breast mass. Labs and further imaging were ordered and patient care was signed out to Dr. Epifanio Cho to follow up on these results. He will discuss with spine here, but patient may need transfer if spine is not comfortable consulting on this case. ED Course       Procedures    Vitals:  Patient Vitals for the past 12 hrs:   Temp Pulse Resp BP SpO2   01/17/17 1713 97.6 °F (36.4 °C) (!) 104 16 132/74 95 %     Medications ordered:   Medications   oxyCODONE-acetaminophen (PERCOCET) 5-325 mg per tablet 1 Tab (1 Tab Oral Given 1/17/17 1825)   HYDROmorphone (PF) (DILAUDID) injection 0.5 mg (0.5 mg IntraVENous Given 1/17/17 2154)   sodium chloride 0.9 % bolus infusion 500 mL (500 mL IntraVENous New Bag 1/17/17 2154)   gadobutrol (Gadavist) contrast solution 9 mL (9 mL IntraVENous Given 1/17/17 2322)         Disposition:  Diagnosis:   1. Neck pain    2. Headache, unspecified headache type        Disposition: Pending    Follow-up Information     None           Patient's Medications   Start Taking    No medications on file   Continue Taking    CYCLOBENZAPRINE (FLEXERIL) 5 MG TABLET    Take 2 Tabs by mouth three (3) times daily. IBUPROFEN (MOTRIN) 800 MG TABLET    Take 1 Tab by mouth every eight (8) hours for 5 days.    These Medications have changed    No medications on file   Stop Taking    No medications on file       SCRIBE ATTESTATION STATEMENT  Documented by: Ellis noble for, and in the presence of, Kristin Peacock MD 6:14 PM     Signed by: Julian Barnett, 1/17/17, 6:14 PM    PROVIDER ATTESTATION STATEMENT  I personally performed the services described in the documentation, reviewed the documentation, as recorded by the scribe in my presence, and it accurately and completely records my words and actions. Wanda Mclaughlin MD    Note:  Assuming care of patient   from leaving provider    9:21 PM  Fan Ivan MD, assumed care of patient from another provider who is ending their shift in the emergency department . I introduced myself to the patient, explained that I was the physician who would be followed the remaining emergency department course. I subsequently reaffirmed the history by the preceding provider, Dr. Jamarcus Bhatia, and reexamined the patient. No current facility-administered medications for this encounter. Current Outpatient Prescriptions   Medication Sig    ibuprofen (MOTRIN) 800 mg tablet Take 1 Tab by mouth every eight (8) hours for 5 days.  cyclobenzaprine (FLEXERIL) 5 mg tablet Take 2 Tabs by mouth three (3) times daily. No past medical history on file. No past surgical history on file. No family history on file. Social History     Social History    Marital status: UNKNOWN     Spouse name: N/A    Number of children: N/A    Years of education: N/A     Occupational History    Not on file. Social History Main Topics    Smoking status: Not on file    Smokeless tobacco: Not on file    Alcohol use Not on file    Drug use: Not on file    Sexual activity: Not on file     Other Topics Concern    Not on file     Social History Narrative       Allergies   Allergen Reactions    Morphine Nausea and Vomiting       Patient's primary care provider (as noted in EPIC):  Katelynn Chapa MD    Abnormal lab results from this emergency department encounter:  Labs Reviewed   CBC WITH AUTOMATED DIFF - Abnormal; Notable for the following:        Result Value    RBC 4.01 (*)     HGB 11.1 (*)     HCT 32.9 (*)     PLATELET 777 (*)     MPV 8.9 (*)     LYMPHOCYTES 14 (*)     MONOCYTES 15 (*)     ABS. NEUTROPHILS 9.1 (*)     ABS.  MONOCYTES 1.9 (*)     All other components within normal limits   METABOLIC PANEL, COMPREHENSIVE - Abnormal; Notable for the following:     Sodium 133 (*)     Chloride 97 (*)     Glucose 108 (*)     BUN 26 (*)     BUN/Creatinine ratio 27 (*)     GFR est non-AA 57 (*)     Calcium 10.4 (*)      (*)     Protein, total 8.7 (*)     Albumin 2.9 (*)     Globulin 5.8 (*)     A-G Ratio 0.5 (*)     All other components within normal limits   PROTHROMBIN TIME + INR - Abnormal; Notable for the following:     Prothrombin time 21.2 (*)     INR 1.9 (*)     All other components within normal limits   MAGNESIUM   PTT   CARDIAC PANEL,(CK, CKMB & TROPONIN)   URINALYSIS W/ RFLX MICROSCOPIC       Lab values for this patient within approximately the last 12 hours:  Recent Results (from the past 12 hour(s))   CBC WITH AUTOMATED DIFF    Collection Time: 01/17/17  9:46 PM   Result Value Ref Range    WBC 12.8 4.6 - 13.2 K/uL    RBC 4.01 (L) 4.20 - 5.30 M/uL    HGB 11.1 (L) 12.0 - 16.0 g/dL    HCT 32.9 (L) 35.0 - 45.0 %    MCV 82.0 74.0 - 97.0 FL    MCH 27.7 24.0 - 34.0 PG    MCHC 33.7 31.0 - 37.0 g/dL    RDW 13.4 11.6 - 14.5 %    PLATELET 030 (H) 625 - 420 K/uL    MPV 8.9 (L) 9.2 - 11.8 FL    NEUTROPHILS 71 42 - 75 %    LYMPHOCYTES 14 (L) 20 - 51 %    MONOCYTES 15 (H) 2 - 9 %    EOSINOPHILS 0 0 - 5 %    BASOPHILS 0 0 - 3 %    ABS. NEUTROPHILS 9.1 (H) 1.8 - 8.0 K/UL    ABS. LYMPHOCYTES 1.8 0.8 - 3.5 K/UL    ABS. MONOCYTES 1.9 (H) 0 - 1.0 K/UL    ABS. EOSINOPHILS 0.0 0.0 - 0.4 K/UL    ABS.  BASOPHILS 0.0 0.0 - 0.06 K/UL    DF MANUAL      PLATELET COMMENTS INCREASED PLATELETS      RBC COMMENTS HYPOCHROMIA  1+       METABOLIC PANEL, COMPREHENSIVE    Collection Time: 01/17/17  9:46 PM   Result Value Ref Range    Sodium 133 (L) 136 - 145 mmol/L    Potassium 3.6 3.5 - 5.5 mmol/L    Chloride 97 (L) 100 - 108 mmol/L    CO2 30 21 - 32 mmol/L    Anion gap 6 3.0 - 18 mmol/L    Glucose 108 (H) 74 - 99 mg/dL    BUN 26 (H) 7.0 - 18 MG/DL    Creatinine 0.95 0.6 - 1.3 MG/DL    BUN/Creatinine ratio 27 (H) 12 - 20      GFR est AA >60 >60 ml/min/1.73m2    GFR est non-AA 57 (L) >60 ml/min/1.73m2    Calcium 10.4 (H) 8.5 - 10.1 MG/DL    Bilirubin, total 0.6 0.2 - 1.0 MG/DL    ALT 38 13 - 56 U/L     (H) 15 - 37 U/L    Alk. phosphatase 66 45 - 117 U/L    Protein, total 8.7 (H) 6.4 - 8.2 g/dL    Albumin 2.9 (L) 3.4 - 5.0 g/dL    Globulin 5.8 (H) 2.0 - 4.0 g/dL    A-G Ratio 0.5 (L) 0.8 - 1.7     MAGNESIUM    Collection Time: 01/17/17  9:46 PM   Result Value Ref Range    Magnesium 2.1 1.8 - 2.4 mg/dL   PROTHROMBIN TIME + INR    Collection Time: 01/17/17  9:46 PM   Result Value Ref Range    Prothrombin time 21.2 (H) 11.5 - 15.2 sec    INR 1.9 (H) 0.8 - 1.2     PTT    Collection Time: 01/17/17  9:46 PM   Result Value Ref Range    aPTT 35.9 23.0 - 36.4 SEC   CARDIAC PANEL,(CK, CKMB & TROPONIN)    Collection Time: 01/17/17  9:46 PM   Result Value Ref Range     26 - 192 U/L    CK - MB 2.9 0.5 - 3.6 ng/ml    CK-MB Index 2.0 0.0 - 4.0 %    Troponin-I, Qt. 0.02 0.0 - 0.045 NG/ML   EKG, 12 LEAD, INITIAL    Collection Time: 01/17/17  9:50 PM   Result Value Ref Range    Ventricular Rate 103 BPM    Atrial Rate 103 BPM    P-R Interval 158 ms    QRS Duration 126 ms    Q-T Interval 348 ms    QTC Calculation (Bezet) 455 ms    Calculated P Axis 28 degrees    Calculated R Axis -41 degrees    Calculated T Axis 118 degrees    Diagnosis       Sinus tachycardia  Left axis deviation  Left bundle branch block  Abnormal ECG  When compared with ECG of 30-JUN-2012 07:01,  ST elevation has replaced ST depression in Anterior leads  T wave inversion now evident in Lateral leads         Radiologist and cardiologist interpretations if available at time of this note:  CT SPINE CERV WO CONT   Final Result      MRI CERV SPINE W WO CONT    (Results Pending)     CT C-spine:  Osteolytic destructive process C2-C4 as above with marked compression deformity  at C3 as above. Possible fracture deformity at the body of C2.  However poorly  evaluated given marked osseous destruction. Differential considerations include  metastasis or myeloma. Infection could have a similar appearance. Suggest  further evaluation with MRI C-spine with and without contrast.      Multiple bilateral pulmonary nodules in the visualized lung apices. Recommend CT  chest.  Medication(s) ordered for patient during this emergency visit encounter:  Medications   oxyCODONE-acetaminophen (PERCOCET) 5-325 mg per tablet 1 Tab (1 Tab Oral Given 1/17/17 1825)   HYDROmorphone (PF) (DILAUDID) injection 0.5 mg (0.5 mg IntraVENous Given 1/17/17 2154)   sodium chloride 0.9 % bolus infusion 500 mL (500 mL IntraVENous New Bag 1/17/17 2154)   gadobutrol (Gadavist) contrast solution 9 mL (9 mL IntraVENous Given 1/17/17 2322)     MRI C-spine for further evaluation of cervical process is pending at time of assuming care. Will also order CT chest/abd/pelvis to assess for possible cancer as a source of metastatic process in neck. Admit to Hospitalist    The patient was presented to the accepting hospitalist, Dr. Frank Saha. The patient's primary doctor is Rei Myers MD, and admissions for this physician are with the hospitalist.  If the patient has no primary doctor, then admission is to the hospitalist as well. As the emergency physician, I wrote courtesy admission orders for the hospitalist physician. The courtesy orders included explicit instructions for the floor nursing staff to call the admitting attending physician upon patient arrival on the floor. Consult Orthopedist on Call    The on call orthopedist group/individual was called and the patient was presented for orthopedic consult. I personally spoke with Dr. Tana Rivera, in the orthopedist group, about the patient's presentation and management. I subsequently placed the noted orthopedist on the treatment team.      Kameron Ramos. Cary Crowley M.D.   PETER Board Certified Emergency Physician

## 2017-01-18 NOTE — PROGRESS NOTES
Care Management Interventions  PCP Verified by CM: Yes (DR. Lord Barnett)  Palliative Care Consult (Criteria: CHF and RRAT>21): No  Reason for No Palliative Care Consult: Other (see comment) (NO ORDER)  Mode of Transport at Discharge: Other (see comment) (FRIEND)  Transition of Care Consult (CM Consult): Discharge Planning  Current Support Network: Family Lives Nearby, Lives Alone, Own Home  Confirm Follow Up Transport: Friends  Plan discussed with Pt/Family/Caregiver: Yes (PT [facility])  [place] of Choice Offered: Yes (PT IS TRYING TO DECIDE AT THIS TIME)   This writer received a call from [facility] that they received a call regarding this pt needing HHC, and did not receive it prior to this admission, this writer asked this pt why she did not receive HHC, pt states she did not know how to. Pt was informed that her PCP can always assist with this service, after pt is discharged. Pt states she has not decided who she wants to receive hhc from at this time.

## 2017-01-18 NOTE — WOUND CARE
Physical Exam   Room 472: pt off the unit to CT Scan testing at this time.   Jasmin AYALAN, RN, [facility]'

## 2017-01-18 NOTE — PROGRESS NOTES
Bedside and Verbal report received from Alma Rodriguez (offgoing nurse) to Ishan Maxwell LPN (oncoming nurse). Report included information from SBAR, Kardex, [facility], and results review.

## 2017-01-18 NOTE — ROUTINE PROCESS
TRANSFER - IN REPORT:    Verbal report received from Dacia Mccormick RN(name) on India Bower  being received from ER(unit) for routine progression of care      Report consisted of patients Situation, Background, Assessment and   Recommendations(SBAR). Information from the following report(s) SBAR, Kardex, Intake/Output and Recent Results was reviewed with the receiving nurse. Opportunity for questions and clarification was provided. Assessment completed upon patients arrival to unit and care assumed.

## 2017-01-18 NOTE — PROCEDURES
[facility]  *** FINAL REPORT ***    Name: Cyrus Santos  MRN: [id number]    Inpatient  : 31 Mar 1940  HIS Order #: [id number]  [address] [facility] Visit #: [id number]  Date: 2017    TYPE OF TEST: Peripheral Venous Testing    REASON FOR TEST  Pain in limb, Limb swelling    Right Leg:-  Deep venous thrombosis:           No  Superficial venous thrombosis:    No  Deep venous insufficiency:        Not examined  Superficial venous insufficiency: Not examined    Left Leg:-  Deep venous thrombosis:           No  Superficial venous thrombosis:    No  Deep venous insufficiency:        Not examined  Superficial venous insufficiency: Not examined      INTERPRETATION/FINDINGS  Duplex images were obtained using 2-D gray scale, color flow, and  spectral Doppler analysis. Right le. No evidence of deep venous thrombosis detected in the veins  visualized. 2. Deep veins visualized include the common femoral, femoral,  popliteal, posterior tibial and peroneal veins. 3. No evidence of superficial thrombosis detected. 4. Superficial veins visualized include the proximal great saphenous  vein. Left le. No evidence of deep venous thrombosis detected in the veins  visualized. 2. Deep veins visualized include the common femoral, femoral,  popliteal, posterior tibial and peroneal veins. 3. No evidence of superficial thrombosis detected. 4. Superficial veins visualized include the proximal great saphenous  vein. ADDITIONAL COMMENTS    I have personally reviewed the data relevant to the interpretation of  this  study. TECHNOLOGIST: Oh Vazquez RVT  Signed: 2017 03:35 PM    PHYSICIAN: Cate Jimenez MD  Signed: 2017 04:37 PM

## 2017-01-18 NOTE — CONSULTS
[address]    Name:  [facility]  MR#:  [id number]  :  1940  Account #:  [de-identified]  Date of Adm:  2017  Date of Consultation:  2017      SURGICAL CONSULTATION    REASON FOR CONSULTATION: Left breast mass. HISTORY OF PRESENT ILLNESS: This 27-year-old patient was  admitted with complaints of neck pain, which on workup documented  to have a cervical spine destructive lesion consistent with metastatic  disease with cord compression. The patient was noted to have a large  breast mass. The exact duration of this is some time in spring of last  year according to the patient and the drainage, bleeding is also present  in the history. The patient denies any pain on the left breast. A foul  odor is noticeable because of necrosis of the tumor on the left breast.    A consult was sought also to establish a new diagnosis and has an  excision biopsy of a portion of the mass in the left breast under local  anesthesia by bedside was planned and discussed with the patient,  however, would like the INR to be at least closer to 1.4. PHYSICAL EXAMINATION  GENERAL: The patient has a neck collar and feels tired of talking, but  no shortness of breath noted. CHEST: Clear anteriorly. HEART: Regular rate and rhythm. Faint heart sounds. BREASTS: The right breast is atrophic. Left breast is humongous with  necrotizing mass growing and involving the entire left breast with  necrosis of the nipple area with foul-smelling discharge. The left axilla  has a big, large mass occupying the whole axillary area. Difficult to feel  for any supraclavicular adenopathy because of the neck issue, but not  on the right side. No axillary adenopathy on the right. 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     PLAN: INR check stat and a bedside excision biopsy for tissue  diagnosis under local anesthesia.         Santa Duncan MD    RP / DC  D:  01/18/2017   17:33  T:  01/18/2017   18:38  Job #:  [id number]

## 2017-01-18 NOTE — DISCHARGE INSTRUCTIONS
Patient armband removed and shredded    DISCHARGE SUMMARY from Nurse    The following personal items are in your possession at time of discharge:    Dental Appliances: None  Visual Aid: None     Home Medications: None  Jewelry: None  Clothing: None  Other Valuables: Purse             PATIENT INSTRUCTIONS:    After general anesthesia or intravenous sedation, for 24 hours or while taking prescription Narcotics:  · Limit your activities  · Do not drive and operate hazardous machinery  · Do not make important personal or business decisions  · Do  not drink alcoholic beverages  · If you have not urinated within 8 hours after discharge, please contact your surgeon on call. Report the following to your surgeon:  · Excessive pain, swelling, redness or odor of or around the surgical area  · Temperature over 100.5  · Nausea and vomiting lasting longer than 4 hours or if unable to take medications  · Any signs of decreased circulation or nerve impairment to extremity: change in color, persistent  numbness, tingling, coldness or increase pain  · Any questions        What to do at Home:  Recommended activity: Activity as tolerated. *  Please give a list of your current medications to your Primary Care Provider. *  Please update this list whenever your medications are discontinued, doses are      changed, or new medications (including over-the-counter products) are added. *  Please carry medication information at all times in case of emergency situations. These are general instructions for a healthy lifestyle:    No smoking/ No tobacco products/ Avoid exposure to second hand smoke    Surgeon General's Warning:  Quitting smoking now greatly reduces serious risk to your health.     Obesity, smoking, and sedentary lifestyle greatly increases your risk for illness    A healthy diet, regular physical exercise & weight monitoring are important for maintaining a healthy lifestyle    You may be retaining fluid if you have a history of heart failure or if you experience any of the following symptoms:  Weight gain of 3 pounds or more overnight or 5 pounds in a week, increased swelling in our hands or feet or shortness of breath while lying flat in bed. Please call your doctor as soon as you notice any of these symptoms; do not wait until your next office visit. Recognize signs and symptoms of STROKE:    F-face looks uneven    A-arms unable to move or move unevenly    S-speech slurred or non-existent    T-time-call 911 as soon as signs and symptoms begin-DO NOT go       Back to bed or wait to see if you get better-TIME IS BRAIN. Warning Signs of HEART ATTACK     Call 911 if you have these symptoms:   Chest discomfort. Most heart attacks involve discomfort in the center of the chest that lasts more than a few minutes, or that goes away and comes back. It can feel like uncomfortable pressure, squeezing, fullness, or pain.  Discomfort in other areas of the upper body. Symptoms can include pain or discomfort in one or both arms, the back, neck, jaw, or stomach.  Shortness of breath with or without chest discomfort.  Other signs may include breaking out in a cold sweat, nausea, or lightheadedness. Don't wait more than five minutes to call 911 - MINUTES MATTER! Fast action can save your life. Calling 911 is almost always the fastest way to get lifesaving treatment. Emergency Medical Services staff can begin treatment when they arrive -- up to an hour sooner than if someone gets to the hospital by car. The discharge information has been reviewed with the patient. The patient verbalized understanding. Discharge medications reviewed with the patient and appropriate educational materials and side effects teaching were provided. Apprats Activation    Thank you for requesting access to App[employer]ats. Please follow the instructions below to securely access and download your online medical record.  App[employer]ats allows you to send messages to your doctor, view your test results, renew your prescriptions, schedule appointments, and more. How Do I Sign Up? 1. In your internet browser, go to [email or web address]  2. Click on the First Time User? Click Here link in the Sign In box. You will be redirect to the New Member Sign Up page. 3. Enter your Reeher Access Code exactly as it appears below. You will not need to use this code after youve completed the sign-up process. If you do not sign up before the expiration date, you must request a new code. Reeher Access Code: [email or web address]  Expires: 2017  4:02 PM (This is the date your Reeher access code will )    4. Enter the last four digits of your Social Security Number (xxxx) and Date of Birth (mm/dd/yyyy) as indicated and click Submit. You will be taken to the next sign-up page. 5. Create a Reeher ID. This will be your Reeher login ID and cannot be changed, so think of one that is secure and easy to remember. 6. Create a Reeher password. You can change your password at any time. 7. Enter your Password Reset Question and Answer. This can be used at a later time if you forget your password. 8. Enter your e-mail address. You will receive e-mail notification when new information is available in [address] 9. Click Sign Up. You can now view and download portions of your medical record. 10. Click the Download Summary menu link to download a portable copy of your medical information. Additional Information    If you have questions, please visit the Frequently Asked Questions section of the Reeher website at [email or web address] Remember, Reeher is NOT to be used for urgent needs. For medical emergencies, dial 911.

## 2017-01-18 NOTE — ROUTINE PROCESS
Bedside and Verbal shift change report given to [address] [place] [address],[address] (oncoming nurse) by Alyce Davila RN (offgoing nurse). Report included the following information SBAR, Kardex, MAR and Recent Results. SITUATION:    Code Status: DNR   Reason for Admission: Neck mass    [facility] day: 1   Problem List:       Hospital Problems  Never Reviewed          Codes Class Noted POA    Neck mass ICD-10-CM: R22.1  ICD-9-CM: 784.2  1/17/2017 Unknown              BACKGROUND:    Past Medical History: No past medical history on file.       Patient taking anticoagulants no     ASSESSMENT:    Changes in Assessment Throughout Shift: none     Patient has Central Line: no Reasons if yes:    Patient has Negro Cath: no Reasons if yes:       Last Vitals:     Vitals:    01/17/17 1713 01/18/17 0400 01/18/17 0642   BP: 132/74 140/60 137/60   Pulse: (!) 104 97 94   Resp: 16 18 18   Temp: 97.6 °F (36.4 °C) 99.1 °F (37.3 °C) 99.1 °F (37.3 °C)   SpO2: 95% 99% 97%   Weight: 90.7 kg (200 lb)     Height: 5' 3\" (1.6 m)          IV and DRAINS (will only show if present)   Peripheral IV 01/17/17 Left Antecubital-Site Assessment: Clean, dry, & intact     WOUND (if present)   Wound Type:  Left breast   Dressing present Dressing Present : No   Wound Concerns/Notes:  CT of chest to be done, wound care consult, oncology consult     PAIN    Pain Assessment    Pain Intensity 1: 0 (01/18/17 0800)    Pain Location 1: Head         Patient Stated Pain Goal: 0  o Interventions for Pain:  none  o Intervention effective: no  o Time of last intervention: none givne   o Reassessment Completed: yes      Last 3 Weights:  Last 3 Recorded Weights in this Encounter    01/17/17 1713   Weight: 90.7 kg (200 lb)     Weight change:      INTAKE/OUPUT    Current Shift:      Last three shifts:       LAB RESULTS     Recent Labs      01/17/17 2146   WBC  12.8   HGB  11.1*   HCT  32.9*   PLT  502*        Recent Labs      01/17/17 2146   NA  133*   K  3.6   GLU  108*   BUN 26*   CREA  0.95   CA  10.4*   MG  2.1   INR  1.9*       RECOMMENDATIONS AND DISCHARGE PLANNING     1. Pending tests/procedures/ Plan of Care or Other Needs: CT of chest and abd, ortho consult, wound care consult     2. Discharge plan for patient and Needs/Barriers: wound care    3. Estimated Discharge Date: 01/21/17 Posted on Whiteboard in [facility] Room: yes      4. The patient's care plan was reviewed with the oncoming nurse. \"HEALS\" SAFETY CHECK      Fall Risk    Total Score: 1    Safety Measures: Safety Measures: Bed/Chair-Wheels locked, Bed in low position, Call light within reach    A safety check occurred in the patient's room between off going nurse and oncoming nurse listed above. The safety check included the below items  Area Items   H  High Alert Medications - Verify all high alert medication drips (heparin, PCA, etc.)   E  Equipment - Suction is set up for ALL patients (with madhav)  - Red plugs utilized for all equipment (IV pumps, etc.)  - WOWs wiped down at end of shift.  - Room stocked with oxygen, suction, and other unit-specific supplies   A  Alarms - Bed alarm is set for fall risk patients  - Ensure chair alarm is in place and activated if patient is up in a chair   L  Lines - Check IV for any infiltration  - Negro bag is empty if patient has a Negro   - Tubing and IV bags are labeled   S  Safety   - Room is clean, patient is clean, and equipment is clean. - Hallways are clear from equipment besides carts. - Fall bracelet on for fall risk patients  - Ensure room is clear and free of clutter  - Suction is set up for ALL patients (with madhav)  - Hallways are clear from equipment besides carts.    - Isolation precautions followed, supplies available outside room, sign posted     Cristy Howard RN

## 2017-01-18 NOTE — INTERDISCIPLINARY ROUNDS
IDR/SLIDR Summary          Patient: Gabby Last MRN: [id number]    Age: 68 y.o. YOB: 1940 Room/Bed: [facility]   Admit Diagnosis: Neck mass  Principal Diagnosis: <principal problem not specified>   Goals: ortho consult with Dr. Melani Hill, wound consult, wound care  Readmission: NO  Quality Measure: Not applicable  VTE Prophylaxis: Not needed  Influenza Vaccine screening completed? YES  Pneumococcal Vaccine screening completed? YES  Mobility needs: Yes   Nutrition plan:Yes  Consults: P. T and O.T. Financial concerns:Yes  Escalated to CM? YES  RRAT Score: 9   Interventions:Home Health  Testing due for pt today?  NO  LOS: 1 days Expected length of stay 3 days  Discharge plan: home or rehab   PCP: Kirit Barrios MD  Transportation needs: Yes    Days before discharge:two or more days before discharge   Discharge disposition: Home or SNF    Signed:     Talon Bell RN  1/18/2017  4:47 AM

## 2017-01-18 NOTE — ED NOTES
Pt sitting on toilet. Pt reports she feels as though she cannot completely empty her bladder and can only go a little bit at a time so she does not want to get up yet.

## 2017-01-18 NOTE — PROGRESS NOTES
SUBJECTIVE:    Lives alone. States she want to be comfortable. Neck pain present. No chest or abdominal pain. No N/V/D. Off and on leg pain. OBJECTIVE:    Visit Vitals    /67 (BP 1 Location: Left arm, BP Patient Position: Head of bed elevated (Comment degrees))    Pulse 99    Temp (!) 100.7 °F (38.2 °C)    Resp 18    Ht 5' 3\" (1.6 m)    Wt 90.7 kg (200 lb)    SpO2 91%    Breastfeeding No    BMI 35.43 kg/m2     RRR  CTA bilaterally  Palpable breast mass in left breast.  Did not let me examine her right breast  NT, BS +  No pedal edema  NAD    ASSESSMENT:    1. 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  2. Breast mass. 3. Hx of PE on warfarin. 4. Leg pain r/o DVT  5. Elevated AST. 6. Low grade fever  7. Moderate protein malnutrition     PLAN:    Blood c/s x 2 now  Add antibiotic  penidng CT report  Talked to dr. Karishma Esteves for biopsy.   Hold coumadin  Oncology consulted  palliative care    --> total time greater than 35 minutes    CMP:   Lab Results   Component Value Date/Time     (L) 01/18/2017 09:12 AM    K 3.7 01/18/2017 09:12 AM    CL 99 (L) 01/18/2017 09:12 AM    CO2 28 01/18/2017 09:12 AM    AGAP 7 01/18/2017 09:12 AM    GLU 87 01/18/2017 09:12 AM    BUN 21 (H) 01/18/2017 09:12 AM    CREA 0.68 01/18/2017 09:12 AM    GFRAA >60 01/18/2017 09:12 AM    GFRNA >60 01/18/2017 09:12 AM    CA 9.5 01/18/2017 09:12 AM    MG 1.9 01/18/2017 09:12 AM    ALB 2.9 (L) 01/17/2017 09:46 PM    TP 8.7 (H) 01/17/2017 09:46 PM    GLOB 5.8 (H) 01/17/2017 09:46 PM    AGRAT 0.5 (L) 01/17/2017 09:46 PM    SGOT 108 (H) 01/17/2017 09:46 PM    ALT 38 01/17/2017 09:46 PM     CBC:   Lab Results   Component Value Date/Time    WBC 12.8 01/17/2017 09:46 PM    HGB 11.1 (L) 01/17/2017 09:46 PM    HCT 32.9 (L) 01/17/2017 09:46 PM     (H) 01/17/2017 09:46 PM

## 2017-01-18 NOTE — ED NOTES
TRANSFER - OUT REPORT:    Verbal report given to Huang Wu RN (name) on Paula Mobile  being transferred to [address] [place] [address] (unit) for routine progression of care       Report consisted of patients Situation, Background, Assessment and   Recommendations(SBAR). Information from the following report(s) SBAR, ED Summary and MAR was reviewed with the receiving nurse. Lines:   Peripheral IV 01/17/17 Left Antecubital (Active)   Site Assessment Clean, dry, & intact 1/17/2017  9:47 PM   Phlebitis Assessment 0 1/17/2017  9:47 PM   Infiltration Assessment 0 1/17/2017  9:47 PM   Dressing Status Clean, dry, & intact 1/17/2017  9:47 PM   Dressing Type Transparent 1/17/2017  9:47 PM   Hub Color/Line Status Pink 1/17/2017  9:47 PM        Opportunity for questions and clarification was provided.       Patient transported with:   Registered Nurse

## 2017-01-18 NOTE — H&P
History and Physical        Patient: Cameron Waterman               Sex: female          DOA: 1/17/2017         YOB: 1940      Age:  68 y.o.        LOS:  LOS: 1 day        HPI:       Cameron Waterman is a 68 y.o. female who presents to the ED c/o neck pain onset one week ago that is progressively worsening. Denies injury or trauma, fever, chills, visual changes, N/V, and any other pertinent sx. Denies leg or arm weakness or numbness. Patient was seen here yesterday for the same sx, and at that time she was prescribed pain medications. States that she was unable to fill her prescriptions due to being in too much pain. Patient has large breast mass on left breast that she has not followed up on over the past year. No further complaints at this time. PAST MEDICAL HISTORY  1. Chronic fatigue syndrome. 2. Ovarian cyst.  3. Hx of PE diagnosed 2012 on chronic coumadin  4. HLP     SURGICAL HISTORY INCLUDES: Cholecystectomy and cataract surgery. SOCIAL HISTORY: She has a remote tobacco use history. She reports she  drinks wine about 2-3 times a week. She used to work part-time at a [employer] and retired recently. She lives alone. FAMILY HISTORY: Significant for presence of diabetes and hypertension. MEDICATIONS:  Pravachol 20mg daily  Warfarin 20mg daily? Social History     Social History    Marital status: UNKNOWN     Spouse name: N/A    Number of children: N/A    Years of education: N/A     Social History Main Topics    Smoking status: Not on file    Smokeless tobacco: Not on file    Alcohol use Not on file    Drug use: Not on file    Sexual activity: Not on file     Other Topics Concern    Not on file     Social History Narrative       Prior to Admission medications    Medication Sig Start Date End Date Taking? Authorizing Provider   ibuprofen (MOTRIN) 800 mg tablet Take 1 Tab by mouth every eight (8) hours for 5 days.  1/16/17 1/21/17  Kwasi Unger MD cyclobenzaprine (FLEXERIL) 5 mg tablet Take 2 Tabs by mouth three (3) times daily.  1/16/17   Chadd Cornelius MD       Allergies   Allergen Reactions    Morphine Nausea and Vomiting       Review of Systems  CONSTITUTIONAL: No Fever, No chills, No weight loss, No Night sweats  HEENT: No nasal discharge, No PN drainage, No epistaxis, No diff in swallowing  CVS: No chest pain, No palpitations, No syncope, No peripheral edema, No PND, No orthopnea  RS: No shortness of breath, No cough, No hemoptysis, No pleuritic chest pain  GI: No abd pain, No vomitting, No diarrhea, No hematemesis, No rectal bleeding, No acid reflux or heartburn  NEURO: No focal weakness, No headaches, No seizures  PSYCH: No anxiety, No depression  MUSCULOSKLETAL: No joint pain or swelling  : No hematuria or dysuria  SKIN: No rash        Physical Exam:      Visit Vitals    /74 (BP 1 Location: Left arm, BP Patient Position: At rest)    Pulse (!) 104    Temp 97.6 °F (36.4 °C)    Resp 16    Ht 5' 3\" (1.6 m)    Wt 90.7 kg (200 lb)    SpO2 95%    BMI 35.43 kg/m2     No intake or output data in the 24 hours ending 01/18/17 0452    Physical Exam:  General: comfortable  Neck: No adenopathy or thyroid swelling  CVS: No significant JVD (Jugular Venous Distention) observed, PMI (Point of Maximum Impulse) not displaced, regular rate and rhythm, S1S2 normal,  no murmurs  RS: Symmetrical chest rise, clear to auscultation bilaterally  Abd: No distention observed, bowel sounds normal, palpation is soft, non tender, no hepatosplenomegaly, no distension or guarding or rigidity  Neuro: non focal, awake, alert  Extrm: no leg edema   Skin: no rash    Labs Reviewed:    Chemistry Recent Labs      01/17/17   2146   GLU  108*   NA  133*   K  3.6   CL  97*   CO2  30   BUN  26*   CREA  0.95   CA  10.4*   MG  2.1   AGAP  6   BUCR  27*   AP  66   TP  8.7*   ALB  2.9*   GLOB  5.8*   AGRAT  0.5*        Lactic Acid No results found for: LAC  No results for input(s): LAC in the last 72 hours. Liver Enzymes Protein, total   Date Value Ref Range Status   01/17/2017 8.7 (H) 6.4 - 8.2 g/dL Final     Albumin   Date Value Ref Range Status   01/17/2017 2.9 (L) 3.4 - 5.0 g/dL Final     Globulin   Date Value Ref Range Status   01/17/2017 5.8 (H) 2.0 - 4.0 g/dL Final     A-G Ratio   Date Value Ref Range Status   01/17/2017 0.5 (L) 0.8 - 1.7   Final     AST   Date Value Ref Range Status   01/17/2017 108 (H) 15 - 37 U/L Final     Alk. phosphatase   Date Value Ref Range Status   01/17/2017 66 45 - 117 U/L Final     Recent Labs      01/17/17   2146   TP  8.7*   ALB  2.9*   GLOB  5.8*   AGRAT  0.5*   SGOT  108*   AP  66        CBC w/Diff Recent Labs      01/17/17   2146   WBC  12.8   RBC  4.01*   HGB  11.1*   HCT  32.9*   PLT  502*   GRANS  71   LYMPH  14*   EOS  0        Cardiac Enzymes Lab Results   Component Value Date/Time     01/17/2017 09:46 PM    CKMB 2.9 01/17/2017 09:46 PM    CKND1 2.0 01/17/2017 09:46 PM    TROIQ 0.02 01/17/2017 09:46 PM        BNP No results found for: BNP, BNPP, XBNPT     Coagulation Recent Labs      01/17/17   2146   PTP  21.2*   INR  1.9*   APTT  35.9         Thyroid  No results found for: T4, T3U, TSH, TSHEXT         ABG No results for input(s): PHI, PHI, PCO2I, PO2, PO2I, HCO3, HCO3I, FIO2, FIO2I in the last 72 hours. No lab exists for component: POC2     Urinalysis No results found for: COLOR, APPRN, SPGRU, REFSG, JOELLE, PROTU, GLUCU, KETU, BILU, UROU, ROSY, LEUKU, GLUKE, EPSU, BACTU, WBCU, RBCU, CASTS, UCRY     Micro  No results for input(s): SDES, CULT in the last 72 hours. No results for input(s): CULT in the last 72 hours. XR (Most Recent).  CXR reviewed by me and compared with previous CXR   Results from Hospital Encounter encounter on 06/28/12   XR CHEST PA LAT   Narrative Ordering MD: Makeda Lewis MD  Signed By: Ankit Rivers MD  ** FINAL **  ---------------------------------------------------------------------  Procedure Date:  06/28/2012   Accession Number:  [id number]          Order No:   [id number]        Procedure:   RDV - CHEST  2 VIEWS       CPT Code:   38269     Admit Diag:   PLUMONARY EDEMA             Reason:   SHORTNESS OF BREATH  INTERPRETATION:  PA And Lateral Chest 2 Views  CPT CODE: 28971  HISTORY: As above. COMPARISON: April 27, 2007. FINDINGS:  The lungs are clear. The heart and mediastinum are unremarkable. No evidence of pleural effusion. Mild thoracic spondylosis. IMPRESSION:  No evidence of acute cardiopulmonary process. .         CT (Most Recent)   Results from [place] encounter on 01/17/17   CT SPINE CERV WO CONT   Narrative CPT CODE: 79454, [id number]    HISTORY: Trauma. COMPARISON: CT chest 6/28/2016 images not available but the report is available. TECHNIQUE:  Axial CT scan cervical spine with sagittal and coronal reformations. Sagittal reconstructions were obtained to better evaluate alignment, disk space  height, interfacet relations, and vertebral integrity. FINDINGS:  Multiple 5 mm or less bilateral upper lobe pulmonary nodule seen in the  visualized portions of the apices. There is osteochondral lytic destruction  involving C2-C4 with destruction involving the body of C2-C4 and the posterior  elements of C2-C3. There is marked compression deformity at C3 with  approximately 2 mm to 3 mm extension of fragments posteriorly. Possible  compression deformity of the body of C2. There is reversal of the cervical  lordosis at this level. .  Moderate cervical spondylosis. Impression IMPRESSION:    Osteolytic destructive process C2-C4 as above with marked compression deformity  at C3 as above. Possible fracture deformity at the body of C2. However poorly  evaluated given marked osseous destruction. Differential considerations include  metastasis or myeloma.  Infection could have a similar appearance. Suggest  further evaluation with MRI C-spine with and without contrast.     Multiple bilateral pulmonary nodules in the visualized lung apices. Recommend CT  chest.  Findings discussed with Dr. Dariela Mcgrath. EKG No results found for this or any previous visit. ECHO No results found for this or any previous visit. PFT No flowsheet data found. 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 Janet Gordon to see. ED placed patient in C-spine collar. Suspect mets from breast cancer. Will need biopsy for path. Keep NPO for possible procedure. Start IVF hydration. 2) Breast mass: Will need biopsy. Wound care consult. 3) Hx of PE on warfarin:  PE diagnosed in 2012. Will hold warfarin for possible procedure. Would consider consult with hematology for anticoagulation recommendations if patient is to be off Coumadin for extended period of time. 4) Elevated AST: unsure cause unless from muscle involvement of breast cancer as other LFTs are normal.  Patient denies any ETOH use. Hold Statin. 5) DVT prophy: none indicated as patient is appropriately anticoagulated. 6) Code status: DNR per patient. Paperwork signed and in the chart.

## 2017-01-18 NOTE — CONSULTS
69 yo F adm last night with neck pain. Has history of some neck pain since November, worse for past week. Pain whenever she stands she gets pain. Has had loss of energy, poor appetite, no weight loss. Denies weakness or numbness, weightloss. Has had breast mass in left breast since last summer but has not told anyone. PE  Mass left breast  Painless ROM of neck except ot extremes. Normal motor delt/b/tr int  ehl ta h q  Normal reflex  Poorly fitted /worthless collar  Painless when reclining    Radiographs  Destructive lesion C2/3/4/5 cw metastatic disease causing cord compression. 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 Likely by presentation from breast CA. Patient requires:  tissue diagnosis with biopsy,   further staging with ct abd/ pelvis, brain, bone scan, cta c spine  Evaluation by oncology  From a spine standpoint dom require a occipital cervical fusion - a very extensive intervention for decompression and stabilization- an intervention I would not want to undertake unless appropriate given prognosis and patient understanding.     Had initial basic discussion with patient who is still somewhat in denial .

## 2017-01-18 NOTE — PROGRESS NOTES
Alessandra [address]  [phone]    Date Consult Received: 01/18/17 @ 12:35pm  Consult placed by:  Dr. Ramakrishna Rodriguez  Reason for consult: Care Decisions    Patient summary:  Estephania Ramirez is a 68y.o. year old with a past history of Chronic fatigue syndrome, ovarian cyst, hx of PE diagnosed in 2012 on chronic coumadin, HLP who was admitted on 1/17/2017 from home with a diagnosis of neck/back pain, breast mass on left breast. They are currently in this location: 2/01.     Is this Primary Palliative Care?  __ Basic pain management   __ Initial resuscitation discussion  __ Routine advance care planning (advance directive, healthcare power of )   __ Basic questions about hospice benefit/services  __ Entering and managing comfort care orders for patients who are comfort care only  __ Following through with details of post-discharge disposition once goals are clear    IF YES,  ACTION:     Is this consultation out of scope of Palliative Medicine?   __ Chronic nonmalignant pain with no serious/life-limiting illness   __ Assessment and management of a substance-abuse disorder    IF YES, ACTION:    Is this a specialty Palliative Medicine Consultation?  __ Advanced pain management  in patients with advanced illness  _X_ Communication about prognosis in advanced illness including care options  __ Complex resuscitation discussions  __ ED consultations that by addressing care goals may impact location of admission  __ Family meeting in ICU patients with limited prognosis or advanced illness   _X_ Psychosocial and spiritual support for patients and families with distress from advanced illness    Is this consultation:  __ Emergent*  PM Team notified to see by close of business day*  *Patient experiencing intolerable escalating symptoms due to advanced illness    __ Urgent**  PM Team notified to see within 24 hours   **Emergent or Urgent is NOT based on time of discharge    _X_ Routine  PM Team reviews medical information, introduces service, and schedules meeting time within 72 hours    ACTION/Notes:  _X_ Advance Care Planning Flowsheet assessed and updated: No Advanced medical directive on file. Alternative family member listed, legal next of kin unknown. Patient made wish for DNR code status and signed Durable DNR in ER. She would benefit from completing AMD.   _X_ Referring team notified of actions      Palliative Medicine provider will see patient tomorrow. Thank you for the opportunity to assist in the care of Ms. Piedad Hunter.    Phil Roberts, MSW  Palliative Medicine

## 2017-01-18 NOTE — ROUTINE PROCESS
Bedside and Verbal shift change report given to [address] [place] [address],[address] (oncoming nurse) by Felix Tolbert RN (offgoing nurse). Report included the following information SBAR, Kardex, MAR and Recent Results. SITUATION:    Code Status: DNR   Reason for Admission: Neck mass    [facility] day: 1   Problem List:       Hospital Problems  Never Reviewed          Codes Class Noted POA    Neck mass ICD-10-CM: R22.1  ICD-9-CM: 784.2  1/17/2017 Unknown              BACKGROUND:    Past Medical History: No past medical history on file.       Patient taking anticoagulants yes     ASSESSMENT:    Changes in Assessment Throughout Shift: none     Patient has Central Line: no Reasons if yes:    Patient has Negro Cath: no Reasons if yes:       Last Vitals:     Patient Vitals for the past 12 hrs:   Temp Pulse Resp BP SpO2   01/18/17 0642 99.1 °F (37.3 °C) 94 18 137/60 97 %   01/18/17 0400 99.1 °F (37.3 °C) 97 18 140/60 99 %     Vitals:    01/17/17 1713   BP: 132/74   Pulse: (!) 104   Resp: 16   Temp: 97.6 °F (36.4 °C)   SpO2: 95%   Weight: 90.7 kg (200 lb)   Height: 5' 3\" (1.6 m)        IV and DRAINS (will only show if present)   Peripheral IV 01/17/17 Left Antecubital-Site Assessment: Clean, dry, & intact  Wound Breast (Active)   DRESSING STATUS Removed 1/18/2017  8:00 AM   DRESSING TYPE ABD pad;Special tape (comment) 1/18/2017  4:37 AM   Wound Length (cm) 11 cm 1/18/2017  8:00 AM   Wound Width (cm) 10 cm 1/18/2017  8:00 AM   Condition of Edges Closed 1/18/2017  8:00 AM   Tissue Type Maroon/purple;Pink;Red 1/18/2017  8:00 AM   Tissue Type Percent Black 0 % 1/18/2017  8:00 AM   Tissue Type Percent Maroon/Purple 25 % 1/18/2017  8:00 AM   Tissue Type Percent Pink 25 1/18/2017  8:00 AM   Tissue Type Percent Red 25 1/18/2017  8:00 AM   Tissue Type Percent Yellow 20 1/18/2017  8:00 AM   Tissue Type Percent Other (comment) 5 1/18/2017  8:00 AM   Drainage Amount  Small  1/18/2017  8:00 AM   Wound Odor Foul 1/18/2017  8:00 AM   Cleansing and Cleansing Agents  Dermal wound cleanser 1/18/2017  8:00 AM   Dressing Type Applied 4 x 4;ABD pad 1/18/2017  8:00 AM   Procedure Tolerated Well 1/18/2017  8:00 AM   Number of days:0           WOUND (if present)   Wound Type:  ABD's to left breast, leaking   Dressing present Dressing Present : No   Wound Concerns/Notes:  none     PAIN    Pain Assessment    Pain Intensity 1: 0 (01/18/17 0210)    Pain Location 1: Head         Patient Stated Pain Goal: 0  o Interventions for Pain:  none  o Intervention effective: no and none givne  o Time of last intervention: none givne   o Reassessment Completed: no      Last 3 Weights:  Last 3 Recorded Weights in this Encounter    01/17/17 1713   Weight: 90.7 kg (200 lb)     Weight change:      INTAKE/OUPUT    Current Shift:      Last three shifts:       LAB RESULTS     Recent Results (from the past 12 hour(s))   CBC WITH AUTOMATED DIFF    Collection Time: 01/17/17  9:46 PM   Result Value Ref Range    WBC 12.8 4.6 - 13.2 K/uL    RBC 4.01 (L) 4.20 - 5.30 M/uL    HGB 11.1 (L) 12.0 - 16.0 g/dL    HCT 32.9 (L) 35.0 - 45.0 %    MCV 82.0 74.0 - 97.0 FL    MCH 27.7 24.0 - 34.0 PG    MCHC 33.7 31.0 - 37.0 g/dL    RDW 13.4 11.6 - 14.5 %    PLATELET 303 (H) 963 - 420 K/uL    MPV 8.9 (L) 9.2 - 11.8 FL    NEUTROPHILS 71 42 - 75 %    LYMPHOCYTES 14 (L) 20 - 51 %    MONOCYTES 15 (H) 2 - 9 %    EOSINOPHILS 0 0 - 5 %    BASOPHILS 0 0 - 3 %    ABS. NEUTROPHILS 9.1 (H) 1.8 - 8.0 K/UL    ABS. LYMPHOCYTES 1.8 0.8 - 3.5 K/UL    ABS. MONOCYTES 1.9 (H) 0 - 1.0 K/UL    ABS. EOSINOPHILS 0.0 0.0 - 0.4 K/UL    ABS.  BASOPHILS 0.0 0.0 - 0.06 K/UL    DF MANUAL      PLATELET COMMENTS INCREASED PLATELETS      RBC COMMENTS HYPOCHROMIA  1+       METABOLIC PANEL, COMPREHENSIVE    Collection Time: 01/17/17  9:46 PM   Result Value Ref Range    Sodium 133 (L) 136 - 145 mmol/L    Potassium 3.6 3.5 - 5.5 mmol/L    Chloride 97 (L) 100 - 108 mmol/L    CO2 30 21 - 32 mmol/L    Anion gap 6 3.0 - 18 mmol/L    Glucose 108 (H) 74 - 99 mg/dL    BUN 26 (H) 7.0 - 18 MG/DL    Creatinine 0.95 0.6 - 1.3 MG/DL    BUN/Creatinine ratio 27 (H) 12 - 20      GFR est AA >60 >60 ml/min/1.73m2    GFR est non-AA 57 (L) >60 ml/min/1.73m2    Calcium 10.4 (H) 8.5 - 10.1 MG/DL    Bilirubin, total 0.6 0.2 - 1.0 MG/DL    ALT 38 13 - 56 U/L     (H) 15 - 37 U/L    Alk.  phosphatase 66 45 - 117 U/L    Protein, total 8.7 (H) 6.4 - 8.2 g/dL    Albumin 2.9 (L) 3.4 - 5.0 g/dL    Globulin 5.8 (H) 2.0 - 4.0 g/dL    A-G Ratio 0.5 (L) 0.8 - 1.7     MAGNESIUM    Collection Time: 01/17/17  9:46 PM   Result Value Ref Range    Magnesium 2.1 1.8 - 2.4 mg/dL   PROTHROMBIN TIME + INR    Collection Time: 01/17/17  9:46 PM   Result Value Ref Range    Prothrombin time 21.2 (H) 11.5 - 15.2 sec    INR 1.9 (H) 0.8 - 1.2     PTT    Collection Time: 01/17/17  9:46 PM   Result Value Ref Range    aPTT 35.9 23.0 - 36.4 SEC   CARDIAC PANEL,(CK, CKMB & TROPONIN)    Collection Time: 01/17/17  9:46 PM   Result Value Ref Range     26 - 192 U/L    CK - MB 2.9 0.5 - 3.6 ng/ml    CK-MB Index 2.0 0.0 - 4.0 %    Troponin-I, Qt. 0.02 0.0 - 0.045 NG/ML   EKG, 12 LEAD, INITIAL    Collection Time: 01/17/17  9:50 PM   Result Value Ref Range    Ventricular Rate 103 BPM    Atrial Rate 103 BPM    P-R Interval 158 ms    QRS Duration 126 ms    Q-T Interval 348 ms    QTC Calculation (Bezet) 455 ms    Calculated P Axis 28 degrees    Calculated R Axis -41 degrees    Calculated T Axis 118 degrees    Diagnosis       Sinus tachycardia  Left axis deviation  Left bundle branch block  Abnormal ECG  When compared with ECG of 30-JUN-2012 07:01,  ST elevation has replaced ST depression in Anterior leads  T wave inversion now evident in Lateral leads       Recent Labs      01/17/17 2146   WBC  12.8   HGB  11.1*   HCT  32.9*   PLT  502*        Recent Labs      01/17/17 2146   NA  133*   K  3.6   GLU  108*   BUN  26*   CREA  0.95   CA  10.4*   MG  2.1   INR  1.9*       RECOMMENDATIONS AND DISCHARGE PLANNING     1. Pending tests/procedures/ Plan of Care or Other Needs: possible neck surgery today, Dr Guy Osorio     2. Discharge plan for patient and Needs/Barriers:home with home health, or rehab and wound care    3. Estimated Discharge Date: 01/25/17 Posted on Whiteboard in cht Room: yes      4. The patient's care plan was reviewed with the oncoming nurse. \"HEALS\" SAFETY CHECK      Fall Risk    Total Score: 1    Safety Measures: Safety Measures: Bed/Chair-Wheels locked, Bed in low position, Call light within reach    A safety check occurred in the patient's room between off going nurse and oncoming nurse listed above. The safety check included the below items  Area Items   H  High Alert Medications - Verify all high alert medication drips (heparin, PCA, etc.)   E  Equipment - Suction is set up for ALL patients (with madhav)  - Red plugs utilized for all equipment (IV pumps, etc.)  - WOWs wiped down at end of shift.  - Room stocked with oxygen, suction, and other unit-specific supplies   A  Alarms - Bed alarm is set for fall risk patients  - Ensure chair alarm is in place and activated if patient is up in a chair   L  Lines - Check IV for any infiltration  - Negro bag is empty if patient has a Negro   - Tubing and IV bags are labeled   S  Safety   - Room is clean, patient is clean, and equipment is clean. - Hallways are clear from equipment besides carts. - Fall bracelet on for fall risk patients  - Ensure room is clear and free of clutter  - Suction is set up for ALL patients (with madhav)  - Hallways are clear from equipment besides carts.    - Isolation precautions followed, supplies available outside room, sign posted     Denise Bowie RN

## 2017-01-19 PROBLEM — C79.51 BONE METASTASIS (HCC): Status: ACTIVE | Noted: 2017-01-01

## 2017-01-19 PROBLEM — R29.898 WEAKNESS OF BOTH ARMS: Status: ACTIVE | Noted: 2017-01-01

## 2017-01-19 PROBLEM — C50.412 MALIGNANT NEOPLASM OF UPPER-OUTER QUADRANT OF LEFT FEMALE BREAST (HCC): Status: ACTIVE | Noted: 2017-01-01

## 2017-01-19 PROBLEM — G95.29 SPINAL CORD COMPRESSION DUE TO MALIGNANT NEOPLASM METASTATIC TO SPINE (HCC): Status: ACTIVE | Noted: 2017-01-01

## 2017-01-19 PROBLEM — C78.01 MALIGNANT NEOPLASM METASTATIC TO RIGHT LUNG (HCC): Status: ACTIVE | Noted: 2017-01-01

## 2017-01-19 PROBLEM — C79.51 SPINAL CORD COMPRESSION DUE TO MALIGNANT NEOPLASM METASTATIC TO SPINE (HCC): Status: ACTIVE | Noted: 2017-01-01

## 2017-01-19 NOTE — PROGRESS NOTES
NUTRITION    Nutrition Consult: General Nutrition Management & Supplements,      RECOMMENDATIONS / PLAN:     - Add nutrition supplement: Ensure Enlive once daily  - Add food preference/specify gluten free in diet order  - Continue RD inpatient monitoring and evaluation     NUTRITION INTERVENTIONS & DIAGNOSIS:     [x] Meals/Snacks: modified diet  [x] Medical food supplementation: add   [x] Vitamin and mineral supplementation: thera-m with iron     Nutrition Diagnosis:  Inadequate energy intake related to decreased appetite as evidenced by poor po intake x 1 week PTA    ASSESSMENT:     Subjective:  Patient reported poor appetite and po intake x 1 week PTA. Fair appetite and po intake since admission. Stated finished 80% of breakfast today. Discussed adding nutrition supplement; pt agreed with plan.  Pt reported having intolerance when eating food with gluten and typically follows a gluten free diet  Current Appetite:   [] Good     [x] Fair     [] Poor     [] Other:  Appetite/meal intake prior to admission:   [] Good     [] Fair     [x] Poor (x 1 week PTA)     [] Other:    Diet: DIET REGULAR     Average po intake adequate to meet patients estimated nutritional needs:   [x] Yes     [] No   [] Unable to determine at this time  Current Meal Intake: Patient Vitals for the past 100 hrs:   % Diet Eaten   01/19/17 0923 100 %      Food Allergies:  None known (possible intolerance to gluten)  Feeding Limitations:  [] Swallowing difficulty    [] Chewing difficulty    [x] Other: pt reported sometimes has soreness on side of neck while eating, but stated is tolerating meals    BM:  1/16  Skin Integrity: abscess on left breast  Edema:  None   Pertinent Medications: Reviewed     Labs:  Recent Labs      01/19/17   0353  01/18/17   0912  01/17/17   2146   NA  134*  134*  133*   K  3.6  3.7  3.6   CL  101  99*  97*   CO2  27  28  30   GLU  119*  87  108*   BUN  16  21*  26*   CREA  0.79  0.68  0.95   CA  8.9  9.5  10.4*   MG  1.9  1.9 2. 1   ALB  1.9*   --   2.9*   SGOT  58*   --   108*   ALT  22   --   38       Intake/Output Summary (Last 24 hours) at 01/19/17 1823  Last data filed at 01/19/17 1279   Gross per 24 hour   Intake              210 ml   Output             1150 ml   Net             -940 ml       Anthropometrics:  Ht Readings from Last 1 Encounters:   01/17/17 5' 3\" (1.6 m)     Last 3 Recorded Weights in this Encounter    01/17/17 1713   Weight: 90.7 kg (200 lb)     Body mass index is 35.43 kg/(m^2). Weight History:  Patient stated usual body weight is 190 lbs    Weight Metrics 1/17/2017 1/16/2017   Weight 200 lb 200 lb   BMI 35.43 kg/m2 34.33 kg/m2        Admitting Diagnosis: Neck mass  No past medical history on file. Education Needs:        [x] None identified  [] Identified - Not appropriate at this time  []  Identified and addressed - refer to education log  Learning Limitations:   [x] None identified  [] Identified    Cultural, Oriental orthodox & ethnic food preferences:  [x] None identified    [] Identified and addressed     ESTIMATED NUTRITION NEEDS:     Calories: 2533-7698 kcal ([id number]) based on  [x] Actual BW:  9 kg    [] IBW:   Protein: 73-91 gm (0.8-1 gm/kg) based on  [x] Actual BW:  91 kg    [] IBW:   Fluid: 1 mL/kcal     MONITORING & EVALUATION:     Nutrition Goal(s):  1. Po intake of meals will meet >75% of patient estimated nutritional needs within the next 5-7 days.   Outcome:  [] Met/Ongoing    []  Not Met    [x] New/Initial Goal     Monitoring:   [x] Monitor meal intake    [] Monitor diet tolerance     [x] Monitor supplement intake    Previous Recommendations (for follow-up assessments only):     []   Implemented       []   Not Implemented (RD to address)     [] No Recommendation Made     Discharge Planning:  Regular diet   [x] Participated in care planning, discharge planning, & interdisciplinary rounds as appropriate      Kilo Maza, [address] [place] [address]   Pager: [phone]

## 2017-01-19 NOTE — CONSULTS
[facility] HEMATOLOGY ONCOLOGY       Assessment/ Plan:     Patient Active Problem List   Diagnosis Code    Neck mass R22.1    Malignant neoplasm of upper-outer quadrant of left female breast ([facility] [address] C50.412    Bone metastasis ([address] C79.51    Spinal cord compression due to malignant neoplasm metastatic to spine (HCC) G95.20, C79.51    Malignant neoplasm metastatic to right lung (HCC) C78.01    Weakness of both arms M62.81     -- Patient wishes to explore treatment if available, states she is not ready to die or go on hospice  -- reverse anticoagulation  -- Lovenox 40 mg subcut daily  -- IV steroids for cord compression  -- Radiation Oncology consult  -- Biopsy of breast mass  -- IV Venofer for iron deficiency anemia   -- check tumor markers  -- palliative care input          Thank you for the opportunity to participate in the care, please do not hesitate to call for any questions or concerns. I have discussed the diagnosis with the patient and the intended plan. The patient verbalized understanding and agrees with the plan.       History:   Darwin Mackey is a 68 y.o. female presenting today for Headache  She is found to have spinal cord compression with metastatic breast cancer    Current Facility-Administered Medications   Medication Dose Route Frequency Provider Last Rate Last Dose    enoxaparin (LOVENOX) injection 40 mg  40 mg SubCUTAneous Q24H Buddy Joseph MD        dexamethasone (DECADRON) 4 mg/mL injection 4 mg  4 mg IntraVENous Q6H Buddy Joseph MD        phytonadione (vitamin K1) (AQUA-MEPHYTON) 10 mg in 0.9% sodium chloride 50 mL IVPB  10 mg IntraVENous ONCE Buddy Joseph MD        iron sucrose (VENOFER) 300 mg in 0.9% sodium chloride 250 mL IVPB  300 mg IntraVENous Q24H Buddy Joseph MD        ondansetron [facility]) injection 4 mg  4 mg IntraVENous Q4H PRN Ramana Amador MD        0.9% sodium chloride infusion  75 mL/hr IntraVENous CONTINUOUS Deb Santos MD 75 mL/hr at 01/19/17 0708 75 mL/hr at 01/19/17 0708    influenza vaccine 2016-17 (36mos+)(PF) (FLUZONE/FLUARIX/FLULAVAL QUAD) injection 0.5 mL  0.5 mL IntraMUSCular PRIOR TO DISCHARGE Deb Santos MD        HYDROmorphone (DILAUDID) syringe 0.5 mg  0.5 mg IntraVENous Q4H PRN Nguyễn Rodriguez MD        oxyCODONE-acetaminophen (PERCOCET) 5-325 mg per tablet 1 Tab  1 Tab Oral Q4H PRN Nguyễn Rodriguez MD   1 Tab at 01/19/17 0708    acetaminophen (TYLENOL) tablet 500 mg  500 mg Oral Q6H PRN Nguyễn Rodriguez MD        famotidine (PEPCID) tablet 20 mg  20 mg Oral BID Nguyễn Rodriguez MD   20 mg at 01/18/17 1807    docusate sodium (COLACE) capsule 100 mg  100 mg Oral BID Nguyễn Rodriguez MD   100 mg at 01/18/17 1807    multivitamin, tx-iron-ca-min (THERA-M w/ IRON) tablet 1 Tab  1 Tab Oral DAILY Nguyễn Rodriguez MD        piperacillin-tazobactam (ZOSYN) 3.375 g in 0.9% sodium chloride (MBP/ADV) 100 mL MBP  3.375 g IntraVENous Q6H Nguyễn Rodriguez  mL/hr at 01/19/17 0353 3.375 g at 01/19/17 0353    albuterol-ipratropium (DUO-NEB) 2.5 MG-0.5 MG/3 ML  3 mL Nebulization Q4H PRN Berto Santillan MD        vancomycin (VANCOCIN) 1,000 mg in 0.9% sodium chloride (MBP/ADV) 250 mL adv  1,000 mg IntraVENous Q18H MD Deb Cruz ON 1/20/2017] VANCOMYCIN TROUGH DUE   Other Daily Nguyễn Rodriguez MD           Objective:   VS:    Visit Vitals    /59 (BP 1 Location: Left arm, BP Patient Position: At rest)    Pulse 89    Temp 99.2 °F (37.3 °C)    Resp 18    Ht 5' 3\" (1.6 m)    Wt 90.7 kg (200 lb)    SpO2 95%    Breastfeeding No    BMI 35.43 kg/m2        Physical Exam:     Constitutional:  Stated age, no acute distress and alert and oriented x 3    HENT:  Oral mucosa pink and moist, no cyanosis observed and no pallor observed.    Eyes:  conjunctiva normal, upper and lower eyelid normal bilaterally.    Neck:  No jugular venous distension present.    Cardiovascular:  heart sounds normal, normal rate and regular rhythm, no murmurs or rubs, no thrills, no S3 or S4 palpable,and no palpable opening snaps. Point of maximal impulse normal. Carotid arteries normal. BP in 2+ extremities not indicated.    Pulmonary/Chest Wall:  breath sounds are coarse bilaterally and no use of accessory muscles in breathing.    Abdominal:  Non tender, no palpable masses, no hepatosplenomegaly, and no abdominal bruits.    Genitourinary/Anorectal:  Occult blood testing is not indicated for this patient.    Musculoskeletal:  No digital clubbing or cyanosis. Normal muscle strength and tone.    Skin:  Normal and no rashes or lesions    Peripheral Vascular:  No edema present in extremities. Femoral pulse normal bilaterally. Dorsalis pedis pulse normal bilaterally.        Review of Systems  Constitutional:  Fever: Negative, Chills: Negative, Weight Loss: ye, Malaise/Fatigue: y  Diaphoresis: Negative, Weakness: y  Skin:  Rash: Negative, Itching: Negative  HENT:  Headache:Negative, Hearing Loss: Negative, Tinnitus: Negative, Ear Pain: Negative  Nosebleeds: Negative, Congestion: Negative, Stridor: Negative,  Sore Throat: Negative  Eyes:   Blurred Vision: Negative, Double Vision: Negative, Photophobia: Negative  Eye Pain: Negative, Eye Discharge: Negative, Eye Redness: Negative  Cardiovascular:   Chest Pain: Negative, Palpitations: Negative, Orthopnea: Negative  Claudication: Negative, Leg Swelling: Negative PND: Negative  Respiratory:  Cough: Negative, Hemoptysis: Negative, Sputum Production: Negative  Shortness of Breath: Negative, Wheezing: Negative  Gastrointestinal:  Heartburn: Negative, Nausea: Negative, Vomiting: Negative  Abdominal Pain: y, Diarrhea: Negative, Constipation: Negative  Blood in Stool: Negative, Melena: Negative   Genitourinary:   Dysuria: Negative, Urgency: Negative, Frequency: Negative  Hematuria: Negative, Flank Pain: Negative  Musculoskeletal:   Myalgias: y, Neck Pain: y, Back Pain: y  Joint Pain: Negative, Falls: Negative  Endo/Heme/Allergies:   Easy Bruise/Blood: Negative, Env. Allergies: Negative, Polydipsia: Negative       Recent Results (from the past 168 hour(s))   CBC WITH AUTOMATED DIFF    Collection Time: 01/17/17  9:46 PM   Result Value Ref Range    WBC 12.8 4.6 - 13.2 K/uL    RBC 4.01 (L) 4.20 - 5.30 M/uL    HGB 11.1 (L) 12.0 - 16.0 g/dL    HCT 32.9 (L) 35.0 - 45.0 %    MCV 82.0 74.0 - 97.0 FL    MCH 27.7 24.0 - 34.0 PG    MCHC 33.7 31.0 - 37.0 g/dL    RDW 13.4 11.6 - 14.5 %    PLATELET 675 (H) 763 - 420 K/uL    MPV 8.9 (L) 9.2 - 11.8 FL    NEUTROPHILS 71 42 - 75 %    LYMPHOCYTES 14 (L) 20 - 51 %    MONOCYTES 15 (H) 2 - 9 %    EOSINOPHILS 0 0 - 5 %    BASOPHILS 0 0 - 3 %    ABS. NEUTROPHILS 9.1 (H) 1.8 - 8.0 K/UL    ABS. LYMPHOCYTES 1.8 0.8 - 3.5 K/UL    ABS. MONOCYTES 1.9 (H) 0 - 1.0 K/UL    ABS. EOSINOPHILS 0.0 0.0 - 0.4 K/UL    ABS. BASOPHILS 0.0 0.0 - 0.06 K/UL    DF MANUAL      PLATELET COMMENTS INCREASED PLATELETS      RBC COMMENTS HYPOCHROMIA  1+       METABOLIC PANEL, COMPREHENSIVE    Collection Time: 01/17/17  9:46 PM   Result Value Ref Range    Sodium 133 (L) 136 - 145 mmol/L    Potassium 3.6 3.5 - 5.5 mmol/L    Chloride 97 (L) 100 - 108 mmol/L    CO2 30 21 - 32 mmol/L    Anion gap 6 3.0 - 18 mmol/L    Glucose 108 (H) 74 - 99 mg/dL    BUN 26 (H) 7.0 - 18 MG/DL    Creatinine 0.95 0.6 - 1.3 MG/DL    BUN/Creatinine ratio 27 (H) 12 - 20      GFR est AA >60 >60 ml/min/1.73m2    GFR est non-AA 57 (L) >60 ml/min/1.73m2    Calcium 10.4 (H) 8.5 - 10.1 MG/DL    Bilirubin, total 0.6 0.2 - 1.0 MG/DL    ALT 38 13 - 56 U/L     (H) 15 - 37 U/L    Alk.  phosphatase 66 45 - 117 U/L    Protein, total 8.7 (H) 6.4 - 8.2 g/dL    Albumin 2.9 (L) 3.4 - 5.0 g/dL    Globulin 5.8 (H) 2.0 - 4.0 g/dL    A-G Ratio 0.5 (L) 0.8 - 1.7     MAGNESIUM    Collection Time: 01/17/17  9:46 PM   Result Value Ref Range    Magnesium 2.1 1.8 - 2.4 mg/dL   PROTHROMBIN TIME + INR    Collection Time: 01/17/17  9:46 PM Result Value Ref Range    Prothrombin time 21.2 (H) 11.5 - 15.2 sec    INR 1.9 (H) 0.8 - 1.2     PTT    Collection Time: 01/17/17  9:46 PM   Result Value Ref Range    aPTT 35.9 23.0 - 36.4 SEC   CARDIAC PANEL,(CK, CKMB & TROPONIN)    Collection Time: 01/17/17  9:46 PM   Result Value Ref Range     26 - 192 U/L    CK - MB 2.9 0.5 - 3.6 ng/ml    CK-MB Index 2.0 0.0 - 4.0 %    Troponin-I, Qt. 0.02 0.0 - 0.045 NG/ML   EKG, 12 LEAD, INITIAL    Collection Time: 01/17/17  9:50 PM   Result Value Ref Range    Ventricular Rate 103 BPM    Atrial Rate 103 BPM    P-R Interval 158 ms    QRS Duration 126 ms    Q-T Interval 348 ms    QTC Calculation (Bezet) 455 ms    Calculated P Axis 28 degrees    Calculated R Axis -41 degrees    Calculated T Axis 118 degrees    Diagnosis       Sinus tachycardia  Left axis deviation  Left bundle branch block  Abnormal ECG  When compared with ECG of 30-JUN-2012 07:01,  ST elevation has replaced ST depression in Anterior leads  T wave inversion now evident in Lateral leads  Confirmed by Boris Saenz ([id number]) on 1/18/2017 7:58:45 PM     URINALYSIS W/ RFLX MICROSCOPIC    Collection Time: 01/17/17 11:55 PM   Result Value Ref Range    Color YELLOW      Appearance CLEAR      Specific gravity >1.030 (H) 1.005 - 1.030    pH (UA) 5.5 5.0 - 8.0      Protein NEGATIVE  NEG mg/dL    Glucose NEGATIVE  NEG mg/dL    Ketone NEGATIVE  NEG mg/dL    Bilirubin NEGATIVE  NEG      Blood NEGATIVE  NEG      Urobilinogen 1.0 0.2 - 1.0 EU/dL    Nitrites NEGATIVE  NEG      Leukocyte Esterase TRACE (A) NEG     URINE MICROSCOPIC ONLY    Collection Time: 01/17/17 11:55 PM   Result Value Ref Range    WBC 3 to 5 0 - 4 /hpf    RBC NEGATIVE  0 - 5 /hpf    Epithelial cells FEW 0 - 5 /lpf    Bacteria 1+ (A) NEG /hpf    Uric Acid Crystals 2+ (A) NEG   MRSA SCREEN - PCR (NASAL)    Collection Time: 01/18/17  7:00 AM   Result Value Ref Range    Special Requests: NO SPECIAL REQUESTS      Culture result:        MRSA target DNA is not detected (presumptive not colonized with MRSA)   METABOLIC PANEL, BASIC    Collection Time: 01/18/17  9:12 AM   Result Value Ref Range    Sodium 134 (L) 136 - 145 mmol/L    Potassium 3.7 3.5 - 5.5 mmol/L    Chloride 99 (L) 100 - 108 mmol/L    CO2 28 21 - 32 mmol/L    Anion gap 7 3.0 - 18 mmol/L    Glucose 87 74 - 99 mg/dL    BUN 21 (H) 7.0 - 18 MG/DL    Creatinine 0.68 0.6 - 1.3 MG/DL    BUN/Creatinine ratio 31 (H) 12 - 20      GFR est AA >60 >60 ml/min/1.73m2    GFR est non-AA >60 >60 ml/min/1.73m2    Calcium 9.5 8.5 - 10.1 MG/DL   MAGNESIUM    Collection Time: 01/18/17  9:12 AM   Result Value Ref Range    Magnesium 1.9 1.8 - 2.4 mg/dL   PROTHROMBIN TIME + INR    Collection Time: 01/18/17  6:00 PM   Result Value Ref Range    Prothrombin time 25.2 (H) 11.5 - 15.2 sec    INR 2.4 (H) 0.8 - 1.2     CULTURE, BLOOD    Collection Time: 01/18/17  7:13 PM   Result Value Ref Range    Special Requests: NO SPECIAL REQUESTS      Culture result: NO GROWTH AFTER 11 HOURS     CULTURE, BLOOD    Collection Time: 01/18/17  7:19 PM   Result Value Ref Range    Special Requests: NO SPECIAL REQUESTS      Culture result: NO GROWTH AFTER 11 HOURS     CBC WITH AUTOMATED DIFF    Collection Time: 01/19/17  3:53 AM   Result Value Ref Range    WBC 11.8 4.6 - 13.2 K/uL    RBC 3.29 (L) 4.20 - 5.30 M/uL    HGB 8.9 (L) 12.0 - 16.0 g/dL    HCT 27.3 (L) 35.0 - 45.0 %    MCV 83.0 74.0 - 97.0 FL    MCH 27.1 24.0 - 34.0 PG    MCHC 32.6 31.0 - 37.0 g/dL    RDW 13.6 11.6 - 14.5 %    PLATELET 792 531 - 907 K/uL    MPV 8.8 (L) 9.2 - 11.8 FL    NEUTROPHILS 74 (H) 40 - 73 %    LYMPHOCYTES 14 (L) 21 - 52 %    MONOCYTES 12 (H) 3 - 10 %    EOSINOPHILS 0 0 - 5 %    BASOPHILS 0 0 - 2 %    ABS. NEUTROPHILS 8.7 (H) 1.8 - 8.0 K/UL    ABS. LYMPHOCYTES 1.6 0.9 - 3.6 K/UL    ABS. MONOCYTES 1.5 (H) 0.05 - 1.2 K/UL    ABS. EOSINOPHILS 0.0 0.0 - 0.4 K/UL    ABS.  BASOPHILS 0.0 0.0 - 0.1 K/UL    DF AUTOMATED     METABOLIC PANEL, COMPREHENSIVE    Collection Time: 01/19/17 3:53 AM   Result Value Ref Range    Sodium 134 (L) 136 - 145 mmol/L    Potassium 3.6 3.5 - 5.5 mmol/L    Chloride 101 100 - 108 mmol/L    CO2 27 21 - 32 mmol/L    Anion gap 6 3.0 - 18 mmol/L    Glucose 119 (H) 74 - 99 mg/dL    BUN 16 7.0 - 18 MG/DL    Creatinine 0.79 0.6 - 1.3 MG/DL    BUN/Creatinine ratio 20 12 - 20      GFR est AA >60 >60 ml/min/1.73m2    GFR est non-AA >60 >60 ml/min/1.73m2    Calcium 8.9 8.5 - 10.1 MG/DL    Bilirubin, total 0.5 0.2 - 1.0 MG/DL    ALT 22 13 - 56 U/L    AST 58 (H) 15 - 37 U/L    Alk. phosphatase 47 45 - 117 U/L    Protein, total 6.3 (L) 6.4 - 8.2 g/dL    Albumin 1.9 (L) 3.4 - 5.0 g/dL    Globulin 4.4 (H) 2.0 - 4.0 g/dL    A-G Ratio 0.4 (L) 0.8 - 1.7     MAGNESIUM    Collection Time: 01/19/17  3:53 AM   Result Value Ref Range    Magnesium 1.9 1.8 - 2.4 mg/dL   PROTHROMBIN TIME + INR    Collection Time: 01/19/17  3:53 AM   Result Value Ref Range    Prothrombin time 24.1 (H) 11.5 - 15.2 sec    INR 2.3 (H) 0.8 - 1.2         No past medical history on file. No past surgical history on file. Social History     Social History    Marital status: UNKNOWN     Spouse name: N/A    Number of children: N/A    Years of education: N/A     Occupational History    Not on file. Social History Main Topics    Smoking status: Not on file    Smokeless tobacco: Not on file    Alcohol use Not on file    Drug use: Not on file    Sexual activity: Not on file     Other Topics Concern    Not on file     Social History Narrative       No family history on file.     Allergies   Allergen Reactions    Morphine Nausea and Vomiting       Follow-up Disposition: Not on File    Irene Maldonado MD

## 2017-01-19 NOTE — PROGRESS NOTES
SUBJECTIVE:    No headaches. Neck pain. No chest or abdominal pain. No SOB or cough. No NV/D. Patient's family is here at bedside. OBJECTIVE:    Visit Vitals    /54 (BP 1 Location: Left arm, BP Patient Position: At rest)    Pulse 90    Temp 98.4 °F (36.9 °C)    Resp 18    Ht 5' 3\" (1.6 m)    Wt 90.7 kg (200 lb)    SpO2 92%    Breastfeeding No    BMI 35.43 kg/m2     HEENT: No pallor. No sinus tenderness. Neck: C-collar in situ  CVS: RRR  RS:CTA bilaterally  Chest: Palpable breast mass in left breast.  Tenderness present  GI: ND, NT, BS +  Extremities: No pedal edema  General: Awake,NAD, follows commands  CNS: Can't raise shoulders. Motor strength 5/5 in LEs. ASSESSMENT:    1. Osteolytic lesion C2 to C4 with pathologic fracture C3 with moderate cord compression C2-C4. 2. Metastatic left Breast mass including bone, lungs, adrenal.  3. Hx of PE on warfarin. 4. Leg pain, no DVT  5. Elevated AST, improving. 6. Low grade fever  7. Moderate protein malnutrition     PLAN:    Pending Blood c/s  And cont antibiotic  reviewed CT and bone scan report  Talked to dr. Wade Gan for biopsy - wants FFP now and biopsy. Nursing was notified to do stat INR after FFP is over.   Oncology and spine surgery input noted  Cont decadron  palliative care consult pending - [facility] to [facility]    --> total time greater than 35 minutes    CMP:   Lab Results   Component Value Date/Time     (L) 01/19/2017 03:53 AM    K 3.6 01/19/2017 03:53 AM     01/19/2017 03:53 AM    CO2 27 01/19/2017 03:53 AM    AGAP 6 01/19/2017 03:53 AM     (H) 01/19/2017 03:53 AM    BUN 16 01/19/2017 03:53 AM    CREA 0.79 01/19/2017 03:53 AM    GFRAA >60 01/19/2017 03:53 AM    GFRNA >60 01/19/2017 03:53 AM    CA 8.9 01/19/2017 03:53 AM    MG 1.9 01/19/2017 03:53 AM    ALB 1.9 (L) 01/19/2017 03:53 AM    TP 6.3 (L) 01/19/2017 03:53 AM    GLOB 4.4 (H) 01/19/2017 03:53 AM    AGRAT 0.4 (L) 01/19/2017 03:53 AM    SGOT 58 (H) 01/19/2017 03:53 AM    ALT 22 01/19/2017 03:53 AM     CBC:   Lab Results   Component Value Date/Time    WBC 11.8 01/19/2017 03:53 AM    HGB 8.9 (L) 01/19/2017 03:53 AM    HCT 27.3 (L) 01/19/2017 03:53 AM     01/19/2017 03:53 AM

## 2017-01-19 NOTE — CONSULTS
[facility]: [phone]  [facility]: [address] [place] [address] Way: [phone]    Patient Name: Jasiel Page  YOB: 1940    Date of Initial Consult: 1/19/17  Reason for Consult: goals of care  Requesting Provider: Dr. Dennys Cartagena  Primary Care Physician: Leena Corbin MD      SUMMARY:   Jasiel Page is a 68y.o. year old with a past history of PE on warfarin, who was admitted on 1/17/2017 from home with a diagnosis of neck pain and left breast mass. Current medical issues leading to Palliative Medicine involvement include: goals of care, Mass left breast and Destructive lesion C2/3/4/5 concerning for metastatic disease causing cord compression. She has left Breast mass and concern for mets to bone, lungs, adrenal.       PALLIATIVE DIAGNOSES:   1. left Breast mass   2. Destructive lesion C2/3/4/5 concerning for metastatic disease causing cord compression. 3. Hx of PE on warfarin  4. Generalized weakness       PLAN:   1. D/w patient at bedside. Patient is alert, awake, oriented-4 and competent to make any decision. Patient is alert and able to engage in conversation appropriately. 2. I discussed with patient her medical condition, and prognosis and code status. 3. I d/w Dr. Erum Oconnor, oncologist as well. 4. Patient seemed overwhelmed and said she had spoken with so many physicians. 5. Patient wants to get breast biposy done and wants to know for results before making any further decision regarding further treatment. 6. Patient has already signed DNR after speaking with Dr. Roger Jenkins. DNR is scanned. 7. Initial consult note routed to primary continuity provider  8. Communicated plan of care with: Palliative IDT, patient.              GOALS OF CARE:     [====Goals of Care====]  GOALS OF CARE:  Patient / health care proxy stated goals: continue aggressive care      TREATMENT PREFERENCES:   Code Status: DNR    Advance Care Planning:  Advance Care Planning 1/18/2017   Patient's Healthcare Decision Maker is: Legal Next of Fortunato   Primary Decision Maker Name Chris Grande   Primary Decision Maker Phone Number [phone]   Primary Decision Maker Relationship to Patient Unknown   Confirm Advance Directive Yes, not on file   Does the patient have other document types Do Not Resuscitate       Other:    The palliative care team has discussed with patient / health care proxy about goals of care / treatment preferences for patient.  [====Goals of Care====]        Advance Care Planning 1/18/2017   Patient's Healthcare Decision Maker is: Legal Next of Gilma Flynn   Primary Decision Maker Name Chris Grande   Primary Decision Maker Phone Number [phone]   Primary Decision Maker Relationship to Patient Unknown   Confirm Advance Directive Yes, not on file   Does the patient have other document types Do Not Resuscitate           HISTORY:     History obtained from: patient    CHIEF COMPLAINT: Karen Kay is a 68y.o. year old with a past history of PE on warfarin, who was admitted on 1/17/2017 from home with a diagnosis of neck pain and left breast mass. Current medical issues leading to Palliative Medicine involvement include: goals of care, Mass left breast and Destructive lesion C2/3/4/5 concerning for metastatic disease causing cord compression.   She has left Breast mass and concern for mets to bone, lungs, adrenal.    HPI/SUBJECTIVE:    The patient is:   [x] Verbal and participatory  [] Non-participatory due to:                FUNCTIONAL ASSESSMENT:     Palliative Performance Scale (PPS): 50          PSYCHOSOCIAL/SPIRITUAL SCREENING:     Advance Care Planning:  Advance Care Planning 1/18/2017   Patient's Healthcare Decision Maker is: Legal Next of Gilma Flynn   Primary Decision Maker Name Chris Grande   Primary Decision Maker Phone Number [phone]   Primary Decision Maker Relationship to Patient Unknown   Confirm Advance Directive Yes, not on file Does the patient have other document types Do Not Resuscitate        Any spiritual / Anabaptism concerns:  [] Yes /  [] No    Caregiver Burnout:  [] Yes /  [] No /  [] No Caregiver Present      Anticipatory grief assessment:   [] Normal  / [] Maladaptive       ESAS Anxiety:      ESAS Depression:          REVIEW OF SYSTEMS:     Positive and pertinent negative findings in ROS are noted above in HPI. The following systems were [] reviewed / [] unable to be reviewed as noted in HPI  Other findings are noted below. Systems: constitutional, ears/nose/mouth/throat, respiratory, gastrointestinal, genitourinary, musculoskeletal, integumentary, neurologic, psychiatric, endocrine. Positive findings noted below. Modified ESAS Completed by: provider                                            PHYSICAL EXAM:     Wt Readings from Last 3 Encounters:   01/17/17 90.7 kg (200 lb)   01/16/17 90.7 kg (200 lb)     Blood pressure 105/54, pulse 90, temperature 98.4 °F (36.9 °C), resp. rate 18, height 5' 3\" (1.6 m), weight 90.7 kg (200 lb), SpO2 92 %, not currently breastfeeding.   Pain:  Pain Scale 1: Numeric (0 - 10)  Pain Intensity 1: 6     Pain Location 1: Neck        Pain Intervention(s) 1: Medication (see MAR)  Last bowel movement:     Constitutional:    Eyes: pupils equal, anicteric  ENMT: no nasal discharge, moist mucous membranes  Cardiovascular: regular rhythm, distal pulses intact  Respiratory: breathing not labored, symmetric  Gastrointestinal: soft non-tender, +bowel sounds  Musculoskeletal: no deformity, no tenderness to palpation  Skin: warm, dry  Neurologic: following commands, moving all extremities  Psychiatric: full affect, no hallucinations  Other:       HISTORY:     Active Problems:    Neck mass (1/17/2017)      Malignant neoplasm of upper-outer quadrant of left female breast ([address] (1/19/2017)      Bone metastasis ([address] (1/19/2017)      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 No past surgical history on file. No family history on file. History reviewed, no pertinent family history.   Social History   Substance Use Topics    Smoking status: Not on file    Smokeless tobacco: Not on file    Alcohol use Not on file     Allergies   Allergen Reactions    Morphine Nausea and Vomiting      Current Facility-Administered Medications   Medication Dose Route Frequency    enoxaparin (LOVENOX) injection 40 mg  40 mg SubCUTAneous Q24H    dexamethasone (DECADRON) 4 mg/mL injection 4 mg  4 mg IntraVENous Q6H    iron sucrose (VENOFER) 300 mg in 0.9% sodium chloride 250 mL IVPB  300 mg IntraVENous Q24H    0.9% sodium chloride infusion 250 mL  250 mL IntraVENous PRN    calcium acetate-aluminum sulf (DOMEBORO) 5 Packet, sodium chloride irrigation 0.9 % 500 mL   Topical BID    ondansetron (ZOFRAN) injection 4 mg  4 mg IntraVENous Q4H PRN    influenza vaccine 2016-17 (36mos+)(PF) (FLUZONE/FLUARIX/FLULAVAL QUAD) injection 0.5 mL  0.5 mL IntraMUSCular PRIOR TO DISCHARGE    HYDROmorphone (DILAUDID) syringe 0.5 mg  0.5 mg IntraVENous Q4H PRN    oxyCODONE-acetaminophen (PERCOCET) 5-325 mg per tablet 1 Tab  1 Tab Oral Q4H PRN    acetaminophen (TYLENOL) tablet 500 mg  500 mg Oral Q6H PRN    famotidine (PEPCID) tablet 20 mg  20 mg Oral BID    docusate sodium (COLACE) capsule 100 mg  100 mg Oral BID    multivitamin, tx-iron-ca-min (THERA-M w/ IRON) tablet 1 Tab  1 Tab Oral DAILY    piperacillin-tazobactam (ZOSYN) 3.375 g in 0.9% sodium chloride (MBP/ADV) 100 mL MBP  3.375 g IntraVENous Q6H    albuterol-ipratropium (DUO-NEB) 2.5 MG-0.5 MG/3 ML  3 mL Nebulization Q4H PRN    vancomycin (VANCOCIN) 1,000 mg in 0.9% sodium chloride (MBP/ADV) 250 mL adv  1,000 mg IntraVENous Q18H    [START ON 1/20/2017] VANCOMYCIN TROUGH DUE   Other Daily LAB AND IMAGING FINDINGS:     Lab Results   Component Value Date/Time    WBC 11.8 01/19/2017 03:53 AM    HGB 8.9 01/19/2017 03:53 AM    PLATELET 352 94/59/8955 03:53 AM     Lab Results   Component Value Date/Time    Sodium 134 01/19/2017 03:53 AM    Potassium 3.6 01/19/2017 03:53 AM    Chloride 101 01/19/2017 03:53 AM    CO2 27 01/19/2017 03:53 AM    BUN 16 01/19/2017 03:53 AM    Creatinine 0.79 01/19/2017 03:53 AM    Calcium 8.9 01/19/2017 03:53 AM    Magnesium 1.9 01/19/2017 03:53 AM      Lab Results   Component Value Date/Time    AST 58 01/19/2017 03:53 AM    Alk. phosphatase 47 01/19/2017 03:53 AM    Protein, total 6.3 01/19/2017 03:53 AM    Albumin 1.9 01/19/2017 03:53 AM    Globulin 4.4 01/19/2017 03:53 AM     Lab Results   Component Value Date/Time    INR 2.3 01/19/2017 03:53 AM    Prothrombin time 24.1 01/19/2017 03:53 AM    aPTT 35.9 01/17/2017 09:46 PM      No results found for: IRON, FE, TIBC, IBCT, PSAT, FERR   No results found for: PH, PCO2, PO2  No components found for: [place]   Lab Results   Component Value Date/Time     01/17/2017 09:46 PM    CK - MB 2.9 01/17/2017 09:46 PM              Total time: 60  Counseling / coordination time:   > 50% counseling / coordination?:     Prolonged service was provided for  []30 min   []75 min in face to face time in the presence of the patient. Time Start:   Time End:   Note: this can only be billed with 41689 (initial) or 94783 (follow up). If multiple start / stop times, list each separately.

## 2017-01-19 NOTE — PROGRESS NOTES
conducted a Follow up consultation and Spiritual Assessment for Fabiana Spencer, who is a 68 y.o.,female. The  provided the following Interventions:  Continued the relationship of care and support. Listened empathically. Offered prayer and assurance of continued prayer on patients behalf. Chart reviewed. The following outcomes were achieved:  Patient expressed gratitude for pastoral care visit. Assessment:  There are no further spiritual or Rastafarian issues which require Spiritual Care Services interventions at this time. Plan:  Chaplains will continue to follow and will provide pastoral care on an as needed/requested basis.  recommends bedside caregivers page  on duty if patient shows signs of acute spiritual or emotional distress.

## 2017-01-19 NOTE — PROGRESS NOTES
conducted an initial consultation and Spiritual Assessment for Phong Marin, who is a 68 y.o.,female. Patients Primary Language is: [place]. According to the patients EMR Latter-day Affiliation is: [facility].     The reason the Patient came to the hospital is:   Patient Active Problem List    Diagnosis Date Noted    Neck mass 01/17/2017        The  provided the following Interventions:  Initiated a relationship of care and support. Explored issues of nicolette, belief, spirituality and Congregation/ritual needs while hospitalized. Listened empathically. Provided chaplaincy education. Provided information about Spiritual Care Services. Offered prayer and assurance of continued prayers on patient's behalf. Chart reviewed. The following outcomes where achieved:  Patient shared limited information about both their medical narrative and spiritual journey/beliefs.  confirmed Patient's Latter-day Affiliation. Patient processed feeling about current hospitalization. Patient expressed gratitude for 's visit. Assessment:  Patient does not have any Congregation/cultural needs that will affect patients preferences in health care. There are no spiritual or Congregation issues which require intervention at this time. Plan:  Chaplains will continue to follow and will provide pastoral care on an as needed/requested basis.  recommends bedside caregivers page  on duty if patient shows signs of acute spiritual or emotional distress. Chaplain Gbaby LUCERO  [address] [facility]  [phone]

## 2017-01-19 NOTE — WOUND CARE
Physical Exam   Room 472: wound assessment  Wound Breast Left (Active) fungating tumor POA   DRESSING STATUS Intact 1/19/2017  1:03 PM   DRESSING TYPE Non-adherent;Gauze;ABD pad 1/19/2017  1:03 PM   Non-Pressure Ulcer Full thickness (subcut/muscle) 1/19/2017  1:03 PM   Wound Length (cm) 8 cm 1/19/2017  1:03 PM   Wound Width (cm) 6 cm 1/19/2017  1:03 PM   Wound Depth (cm) 0.5 1/19/2017  1:03 PM   Wound Surface area (cm^3) 24 cm^2 1/19/2017  1:03 PM   Condition of [place];Slough;tumor 1/19/2017  1:03 PM   Condition of Edges Closed 1/19/2017  1:03 PM   Epithelialization (%) 0 1/19/2017  1:03 PM   Tissue Type Pink;Yellow 1/19/2017  1:03 PM   Tissue Type Percent Pink 30 1/19/2017  1:03 PM   Tissue Type Percent Yellow 70 1/19/2017  1:03 PM   Drainage Amount  Moderate 1/19/2017  1:03 PM   Drainage Color Purulent 1/19/2017  1:03 PM   Wound Odor Foul 1/19/2017  1:03 PM   Periwound Skin Condition Intact;Edematous 1/19/2017  1:03 PM   Dressing Type Applied Non-adherent;Gauze;ABD pad 1/19/2017  1:03 PM   Procedure Tolerated Well 1/19/2017  1:03 PM   Number of days:1       Pt agreeable to Domeboro's solution wet to dry to left breast to assist with odor control. Will turn over care to nursing staff at this time.   [facility] Aldo BSN, RN, [facility] [employer], [address]

## 2017-01-19 NOTE — PROGRESS NOTES
vss afeb  Neuro intact  Pain minimal  In soft collar  Discussed at length situation  Appears to now understand the gravity of situation and coming to  with decisions  INR continues to be elevated  Had fever  CT with lesion cw bone scan at t8/9 cw with met  Also possible lesion in femur  remainder ct not read  Surgery planing bedside biopsy of beast when INR allows- but most likely this is Breast CA. AP    From spine perspective decision is to perform a posterior occipital cervical fusion C0- C6 the goal of which is to stabilize and mobilize patient. It is a procedure with significant pain and morbidity. But it holds the hope of getting her out of bed and walking and getting her out of the hospital.  The alternative is simple comfort measures- maintenance of bedrest, her collar, medications, -in essence palliative/hospice care. In either case her prognosis would appear to be very poor and limited. I have outline both to her. She says she need to think about things. Oncology has been consulted as well as Palliative medicine. I will  schedule surgical intervention i the patient decides that it is the direction she wishes to go in . I have outlined this to her. She wishes time to consider things.

## 2017-01-19 NOTE — CONSULTS
Consult Note    Patient: Shanda Dao               Sex: female          DOA: 1/17/2017         YOB: 1940      Age:  68 y.o.        LOS:  LOS: 1 day              HPI:     Shanda Dao is a 68 y.o. female who has been seen for fungating breast mass Bx    Surgery is already involved. Bx by IR can be performed next week if needed. Thank you     D/w hospitalist service. No past medical history on file. No past surgical history on file. No family history on file. Social History     Social History    Marital status: UNKNOWN     Spouse name: N/A    Number of children: N/A    Years of education: N/A     Social History Main Topics    Smoking status: Not on file    Smokeless tobacco: Not on file    Alcohol use Not on file    Drug use: Not on file    Sexual activity: Not on file     Other Topics Concern    Not on file     Social History Narrative       Prior to Admission medications    Medication Sig Start Date End Date Taking? Authorizing Provider   cyclobenzaprine (FLEXERIL) 5 mg tablet Take 2 Tabs by mouth three (3) times daily. 1/16/17  Yes Timoteo Rodriguez MD   ibuprofen (MOTRIN) 800 mg tablet Take 1 Tab by mouth every eight (8) hours for 5 days. 1/16/17 1/21/17  Timoteo Rodriguez MD       Allergies   Allergen Reactions    Morphine Nausea and Vomiting       Review of Systems  Pertinent items are noted in the History of Present Illness. Physical Exam:      Visit Vitals    /71 (BP 1 Location: Left arm, BP Patient Position: Head of bed elevated (Comment degrees))    Pulse 92    Temp 97.8 °F (36.6 °C)    Resp 18    Ht 5' 3\" (1.6 m)    Wt 90.7 kg (200 lb)    SpO2 95%    Breastfeeding No    BMI 35.43 kg/m2       Physical Exam:  Physical exam not obtained due to patient factors. Labs Reviewed:  Lab results reviewed. For significant abnormal values and values requiring intervention, see assessment and plan.     Assessment/Plan     Active Problems:    Neck mass (1/17/2017)        Surgery is addressing breast Bx this week.      Thank you     Will sign off

## 2017-01-19 NOTE — PROGRESS NOTES
GEN SURG    Patient in bed comfortable, except when she  Moves her head  Patient aware of fungating Left breast mass  She is alert, awake and oriented , friends by bedside  Cervical collar in place, Left breast same fungating mass ulceration with large fixed Left axillary mass( matted lymph noded)  PT/INR: Still elevated( 24/ 2. 3)    Plan: FFP infusion  Ordered and PRBC           Excision biopsy Left breast mass, bedside under local anesthesia when INR 1.4 or less

## 2017-01-19 NOTE — PROGRESS NOTES
1. D/w patient at bedside. Patient is alert, awake, oriented-4 and competent to make any decision. Patient is alert and able to engage in conversation appropriately. 2. I discussed with patient her medical condition, and prognosis and code status. 3. I d/w Dr. Thomas Phoenix, oncologist as well. 4. Patient seemed overwhelmed and said she had spoken with so many physicians. 5. Patient wants to get breast biposy done and wants to know results before making any further decision regarding further treatment. 6. Initial consult note routed to primary continuity provider  7. Communicated plan of care with: Palliative IDT, patient.       Signed By: Ayaka Berger MD     January 19, 2017

## 2017-01-19 NOTE — ROUTINE PROCESS
Bedside and Verbal shift change report given to Araseli BURGOS (oncoming nurse) by Eliezer Lemus RN (offgoing nurse). Report included the following information SBAR, Kardex, MAR and Recent Results. SITUATION:    Code Status: DNR   Reason for Admission: Neck mass    [facility] day: 2   Problem List:       Hospital Problems  Never Reviewed          Codes Class Noted POA    Neck mass ICD-10-CM: R22.1  ICD-9-CM: 784.2  1/17/2017 Unknown              BACKGROUND:    Past Medical History: No past medical history on file.       Patient taking anticoagulants no     ASSESSMENT:    Changes in Assessment Throughout Shift: none     Patient has Central Line: no Reasons if yes:    Patient has Negro Cath: no Reasons if yes:       Last Vitals:     Vitals:    01/18/17 0857 01/18/17 1721 01/18/17 2000 01/19/17 0351   BP: 121/67 126/57 142/71 112/63   Pulse: 99 92 92 85   Resp: 18 18 18 18   Temp: (!) 100.7 °F (38.2 °C) 99.4 °F (37.4 °C) 97.8 °F (36.6 °C) 99 °F (37.2 °C)   SpO2: 91% 90% 95% 90%   Weight:       Height:            IV and DRAINS (will only show if present)   Peripheral IV 01/17/17 Left Antecubital-Site Assessment: Clean, dry, & intact     WOUND (if present)   Wound Type:  Left breast   Dressing present Dressing Present : No   Wound Concerns/Notes:  Breast biopsy to be done when INR is lower, wound care consult     PAIN    Pain Assessment    Pain Intensity 1: 0 (01/19/17 0351)    Pain Location 1: Head (back of head)    Pain Intervention(s) 1: Medication (see MAR)    Patient Stated Pain Goal: 0  o Interventions for Pain:  Pain meds prn  o Intervention effective: yes  o Time of last intervention: 2309   o Reassessment Completed: yes      Last 3 Weights:  Last 3 Recorded Weights in this Encounter    01/17/17 1713   Weight: 90.7 kg (200 lb)     Weight change:      INTAKE/OUPUT    Current Shift: 01/18 1901 - 01/19 0700  In: -   Out: 300 [Urine:300]    Last three shifts:       LAB RESULTS     Recent Results (from the past 12 hour(s))   CBC WITH AUTOMATED DIFF    Collection Time: 01/19/17  3:53 AM   Result Value Ref Range    WBC 11.8 4.6 - 13.2 K/uL    RBC 3.29 (L) 4.20 - 5.30 M/uL    HGB 8.9 (L) 12.0 - 16.0 g/dL    HCT 27.3 (L) 35.0 - 45.0 %    MCV 83.0 74.0 - 97.0 FL    MCH 27.1 24.0 - 34.0 PG    MCHC 32.6 31.0 - 37.0 g/dL    RDW 13.6 11.6 - 14.5 %    PLATELET 413 297 - 531 K/uL    MPV 8.8 (L) 9.2 - 11.8 FL    NEUTROPHILS 74 (H) 40 - 73 %    LYMPHOCYTES 14 (L) 21 - 52 %    MONOCYTES 12 (H) 3 - 10 %    EOSINOPHILS 0 0 - 5 %    BASOPHILS 0 0 - 2 %    ABS. NEUTROPHILS 8.7 (H) 1.8 - 8.0 K/UL    ABS. LYMPHOCYTES 1.6 0.9 - 3.6 K/UL    ABS. MONOCYTES 1.5 (H) 0.05 - 1.2 K/UL    ABS. EOSINOPHILS 0.0 0.0 - 0.4 K/UL    ABS. BASOPHILS 0.0 0.0 - 0.1 K/UL    DF AUTOMATED     METABOLIC PANEL, COMPREHENSIVE    Collection Time: 01/19/17  3:53 AM   Result Value Ref Range    Sodium 134 (L) 136 - 145 mmol/L    Potassium 3.6 3.5 - 5.5 mmol/L    Chloride 101 100 - 108 mmol/L    CO2 27 21 - 32 mmol/L    Anion gap 6 3.0 - 18 mmol/L    Glucose 119 (H) 74 - 99 mg/dL    BUN 16 7.0 - 18 MG/DL    Creatinine 0.79 0.6 - 1.3 MG/DL    BUN/Creatinine ratio 20 12 - 20      GFR est AA >60 >60 ml/min/1.73m2    GFR est non-AA >60 >60 ml/min/1.73m2    Calcium 8.9 8.5 - 10.1 MG/DL    Bilirubin, total 0.5 0.2 - 1.0 MG/DL    ALT 22 13 - 56 U/L    AST 58 (H) 15 - 37 U/L    Alk.  phosphatase 47 45 - 117 U/L    Protein, total 6.3 (L) 6.4 - 8.2 g/dL    Albumin 1.9 (L) 3.4 - 5.0 g/dL    Globulin 4.4 (H) 2.0 - 4.0 g/dL    A-G Ratio 0.4 (L) 0.8 - 1.7     MAGNESIUM    Collection Time: 01/19/17  3:53 AM   Result Value Ref Range    Magnesium 1.9 1.8 - 2.4 mg/dL   PROTHROMBIN TIME + INR    Collection Time: 01/19/17  3:53 AM   Result Value Ref Range    Prothrombin time 24.1 (H) 11.5 - 15.2 sec    INR 2.3 (H) 0.8 - 1.2       Recent Labs      01/19/17   0353  01/17/17   2146   WBC  11.8  12.8   HGB  8.9*  11.1*   HCT  27.3*  32.9*   PLT  396  502*        Recent Labs      01/19/17   0353 01/18/17   1800  01/18/17   0912  01/17/17   2146   NA  134*   --   134*  133*   K  3.6   --   3.7  3.6   GLU  119*   --   87  108*   BUN  16   --   21*  26*   CREA  0.79   --   0.68  0.95   CA  8.9   --   9.5  10.4*   MG  1.9   --   1.9  2.1   INR  2.3*  2.4*   --   1.9*       RECOMMENDATIONS AND DISCHARGE PLANNING     1. Pending tests/procedures/ Plan of Care or Other Needs: breast biopsy, wound care consult     2. Discharge plan for patient and Needs/Barriers: wound care, antibiotics,     3. Estimated Discharge Date: 01/26/17 Posted on Whiteboard in Patients Room: no      4. The patient's care plan was reviewed with the oncoming nurse. \"HEALS\" SAFETY CHECK      Fall Risk    Total Score: 1    Safety Measures: Safety Measures: Bed/Chair-Wheels locked, Bed in low position, Call light within reach    A safety check occurred in the patient's room between off going nurse and oncoming nurse listed above. The safety check included the below items  Area Items   H  High Alert Medications - Verify all high alert medication drips (heparin, PCA, etc.)   E  Equipment - Suction is set up for ALL patients (with madhav)  - Red plugs utilized for all equipment (IV pumps, etc.)  - WOWs wiped down at end of shift.  - Room stocked with oxygen, suction, and other unit-specific supplies   A  Alarms - Bed alarm is set for fall risk patients  - Ensure chair alarm is in place and activated if patient is up in a chair   L  Lines - Check IV for any infiltration  - Negro bag is empty if patient has a Negro   - Tubing and IV bags are labeled   S  Safety   - Room is clean, patient is clean, and equipment is clean. - Hallways are clear from equipment besides carts. - Fall bracelet on for fall risk patients  - Ensure room is clear and free of clutter  - Suction is set up for ALL patients (with madhav)  - Hallways are clear from equipment besides carts.    - Isolation precautions followed, supplies available outside room, sign posted Cassy Mcclellan, RN

## 2017-01-20 NOTE — PROGRESS NOTES
Radiation Oncology    I was informed about this patient by Dr. Anahy Bae, medical oncology. This 68 YOF has large left breast mass and LN consistent with locally advanced breast cancer. She was admitted to the hospital with severe neck pain. By imaging she has destructive metastatic disease to the C-spine. Pathology from the breast is pending as is surgical stabilization by Dr. Tejas Olmstead. She will be a candidate for postoperative radiation therapy to the C-spine after time for healing. She may come for formal outpatient consultation after discharge.  Thank you for your kind referral.     Conrad Quiroga MD

## 2017-01-20 NOTE — PROGRESS NOTES
responded to physician's request to visit with and support patient due to new diagnosis.  attempted to visit this morning. Patient stated that she wanted to watch the inauguration at this time and asked  to try at a better time. She added that her cousins are coming to visit later this afternoon. Patient expressed gratitude for attempt to visit and offer support. Martín Andrey will attempt to follow up at a better time. Left contact information for patient. Jovon Rendon, Palliative     [phone] attempted another visit with patient who stated that her cousins had just left and that she was feeling too tired for 's visit at this time. Chaplains will continue to be available to patient for support.

## 2017-01-20 NOTE — PROGRESS NOTES
SUBJECTIVE:    No headaches. Neck pain. No chest or abdominal pain. No SOB or cough. No NV/D. Patient's family is here at bedside. OBJECTIVE:    Visit Vitals    /67 (BP 1 Location: Left arm, BP Patient Position: At rest)    Pulse 96    Temp 98 °F (36.7 °C)    Resp 18    Ht 5' 3\" (1.6 m)    Wt 90.7 kg (200 lb)    SpO2 92%    Breastfeeding No    BMI 35.43 kg/m2     Neck: C-collar in situ  CVS: RRR  RS:CTA bilaterally  GI: ND, NT, BS +  Extremities: No pedal edema  General: Awake,NAD, follows commands  CNS: Motor strength 5/5 in LEs. ASSESSMENT:    1. Osteolytic lesion C2 to C4 with pathologic fracture C3 with moderate cord compression C2-C4. 2. Metastatic left Breast mass including bone, lungs, adrenal.  3. Hx of PE on warfarin. 4. Leg pain, no DVT  5. Elevated AST, improving. 6. Low grade fever  7.  Moderate protein malnutrition     PLAN:    Negative blood c/s  Cont antibiotic for 2 more days  Pending biopsy  Spine surgery, oncology input nted    --> total time greater than 30 minutes    CMP:   Lab Results   Component Value Date/Time     01/20/2017 05:03 AM    K 3.8 01/20/2017 05:03 AM     01/20/2017 05:03 AM    CO2 28 01/20/2017 05:03 AM    AGAP 7 01/20/2017 05:03 AM     (H) 01/20/2017 05:03 AM    BUN 13 01/20/2017 05:03 AM    CREA 0.67 01/20/2017 05:03 AM    GFRAA >60 01/20/2017 05:03 AM    GFRNA >60 01/20/2017 05:03 AM    CA 9.3 01/20/2017 05:03 AM    MG 2.0 01/20/2017 05:03 AM     CBC:   Lab Results   Component Value Date/Time    WBC 12.3 01/20/2017 05:03 AM    HGB 9.0 (L) 01/20/2017 05:03 AM    HCT 27.5 (L) 01/20/2017 05:03 AM     01/20/2017 05:03 AM

## 2017-01-20 NOTE — PROGRESS NOTES
Verbal and bedside report given to RN Kanchan Douglas (oncoming Nurse) by (off going) Nurse Vianney Escobar, Report included SBAR,MAR recent labs,Intake and Output. Patient continues to take PRN Percocet for symptom management ,patient states she is comfortable until she goes to move her head, cervical neck colar in place,she continues to be awake , oriented to all spheres and request her needs, Friends  And family visited during the day, patient was tearful during their visit  and expressed she needs time to really think about what she wants to do in this situation,once the biopsy can be done and results are in,PT INR scheduled to be redrawn, She has received FFP 1unit on this shift and one is currently being infused at this time. Plan is biopsy to that left breast mass, bedside under local anethesia when INR 1.4 or less.

## 2017-01-20 NOTE — INTERDISCIPLINARY ROUNDS
IDR/SLIDR Summary          Patient: Javier Jason MRN: [id number]    Age: 68 y.o. YOB: 1940 Room/Bed: [facility]   Admit Diagnosis: Neck mass  Principal Diagnosis: <principal problem not specified>   Goals: biopsy of left breast, antibiotics, palliative care, ortho following   Readmission: NO  Quality Measure: Not applicable  VTE Prophylaxis: Not needed  Influenza Vaccine screening completed? YES  Pneumococcal Vaccine screening completed? YES  Mobility needs: Yes   Nutrition plan:Yes  Consults:Case Management    Financial concerns:No  Escalated to CM? YES  RRAT Score: 9   Interventions:Palliative Care   Testing due for pt today?  YES  LOS: 3 days Expected length of stay 3 days  Discharge plan: home health, SNF, Hospice   PCP: Murrel Apgar, MD  Transportation needs: Yes    Days before discharge:two or more days before discharge   Discharge disposition: several options in place, from home to hospice    Signed:     Emelyn Avendano RN  2017  4:13 AM

## 2017-01-20 NOTE — PROGRESS NOTES
GEN SURG    EXCISION BIOPSY LEFT BREAST X 2  ANESTHESIA: MARCAINE 0.25%  PRE OP DX: CA  LEFT BREAST ( pending Pathology)  POST OP DX: SAME PENDING PATHOLOGY  BLOOD LOSS: 5CC  SPECIMEN: MASS/ BREAST TISSUE NIPPLE AREA AND PERIPHERALLY  CX: NONE    Rec: Need TOTAL MASTECTOMY( Toilet mastectomy- to prevent chest wall ulceration)           Procedure  To be done after neck surgery

## 2017-01-20 NOTE — PROGRESS NOTES
[facility] HEMATOLOGY ONCOLOGY       Assessment/ Plan:     Patient Active Problem List   Diagnosis Code    Neck mass R22.1    Malignant neoplasm of upper-outer quadrant of left female breast ([facility] [address] C50.412    Bone metastasis ([address] C79.51    Spinal cord compression due to malignant neoplasm metastatic to spine (HCC) G95.20, C79.51    Malignant neoplasm metastatic to right lung (HCC) C78.01    Weakness of both arms M62.81     -- anticoagulation reversal done  -- Lovenox 40 mg subcut daily  -- IV steroids for cord compression  -- Radiation Oncology consult pending ( will hold off for now since patient is going for surgery)  -- Biopsy of breast mass likely today   -- IV Venofer for iron deficiency anemia, second dose today  -- check tumor markers  -- Ortho recs noted, likely stabilization surgery soon          Thank you for the opportunity to participate in the care, please do not hesitate to call for any questions or concerns. I have discussed the diagnosis with the patient and the intended plan. The patient verbalized understanding and agrees with the plan.       History:   Karen Kay is a 68 y.o. female presenting today for Headache  She is found to have spinal cord compression with metastatic breast cancer    Current Facility-Administered Medications   Medication Dose Route Frequency Provider Last Rate Last Dose    enoxaparin (LOVENOX) injection 40 mg  40 mg SubCUTAneous Q24H José Luis Wahl MD   40 mg at 01/19/17 1000    dexamethasone (DECADRON) 4 mg/mL injection 4 mg  4 mg IntraVENous Q6H José Luis Wahl MD   4 mg at 01/20/17 0645    iron sucrose (VENOFER) 300 mg in 0.9% sodium chloride 250 mL IVPB  300 mg IntraVENous Q24H José Luis Wahl MD   300 mg at 01/19/17 1000    0.9% sodium chloride infusion 250 mL  250 mL IntraVENous PRN Krishna Verduzco MD        calcium acetate-aluminum sulf (DOMEBORO) 5 Packet, sodium chloride irrigation 0.9 % 500 mL   Topical BID Des Reyes MD        ondansetron [facility]) injection 4 mg  4 mg IntraVENous Q4H PRN Des Reyes MD        influenza vaccine 5317-85 (36mos+)(PF) (FLUZONE/FLUARIX/FLULAVAL QUAD) injection 0.5 mL  0.5 mL IntraMUSCular PRIOR TO DISCHARGE Des Reyes MD        HYDROmorphone (DILAUDID) syringe 0.5 mg  0.5 mg IntraVENous Q4H PRN Herman Au MD        oxyCODONE-acetaminophen (PERCOCET) 5-325 mg per tablet 1 Tab  1 Tab Oral Q4H PRN Herman Au MD   1 Tab at 01/19/17 2332    acetaminophen (TYLENOL) tablet 500 mg  500 mg Oral Q6H PRN Herman Au MD        famotidine (PEPCID) tablet 20 mg  20 mg Oral BID Herman Au MD   20 mg at 01/19/17 1749    docusate sodium (COLACE) capsule 100 mg  100 mg Oral BID Herman Au MD   100 mg at 01/19/17 1748    multivitamin, tx-iron-ca-min (THERA-M w/ IRON) tablet 1 Tab  1 Tab Oral DAILY Herman Au MD   1 Tab at 01/19/17 0900    piperacillin-tazobactam (ZOSYN) 3.375 g in 0.9% sodium chloride (MBP/ADV) 100 mL MBP  3.375 g IntraVENous Q6H Herman Au  mL/hr at 01/20/17 0643 3.375 g at 01/20/17 0643    albuterol-ipratropium (DUO-NEB) 2.5 MG-0.5 MG/3 ML  3 mL Nebulization Q4H PRN Herman Au MD        vancomycin (VANCOCIN) 1,000 mg in 0.9% sodium chloride (MBP/ADV) 250 mL adv  1,000 mg IntraVENous Q18H Herman Au .7 mL/hr at 01/20/17 0644 1,000 mg at 01/20/17 0644       Objective:   VS:    Visit Vitals    /58 (BP 1 Location: Right arm, BP Patient Position: At rest)    Pulse 86    Temp 96.9 °F (36.1 °C)    Resp 18    Ht 5' 3\" (1.6 m)    Wt 90.7 kg (200 lb)    SpO2 94%    Breastfeeding No    BMI 35.43 kg/m2        Physical Exam:     Constitutional:  Stated age, no acute distress and alert and oriented x 3    HENT:  Oral mucosa pink and moist, no cyanosis observed and no pallor observed.    Eyes:  conjunctiva normal, upper and lower eyelid normal bilaterally.    Neck:  No jugular venous distension present.    Cardiovascular:  heart sounds normal, normal rate and regular rhythm, no murmurs or rubs, no thrills, no S3 or S4 palpable,and no palpable opening snaps. Point of maximal impulse normal. Carotid arteries normal. BP in 2+ extremities not indicated.    Pulmonary/Chest Wall:  breath sounds are coarse bilaterally and no use of accessory muscles in breathing.    Abdominal:  Non tender, no palpable masses, no hepatosplenomegaly, and no abdominal bruits.    Genitourinary/Anorectal:  Occult blood testing is not indicated for this patient.    Musculoskeletal:  No digital clubbing or cyanosis. Normal muscle strength and tone.    Skin:  Normal and no rashes or lesions    Peripheral Vascular:  No edema present in extremities. Femoral pulse normal bilaterally. Dorsalis pedis pulse normal bilaterally.     Left breast mass and axillary nodes are almost fixed to chest wall     Review of Systems  Constitutional:  Fever: Negative, Chills: Negative, Weight Loss: ye, Malaise/Fatigue: y  Diaphoresis: Negative, Weakness: y  Skin:  Rash: Negative, Itching: Negative  HENT:  Headache:Negative, Hearing Loss: Negative, Tinnitus: Negative, Ear Pain: Negative  Nosebleeds: Negative, Congestion: Negative, Stridor: Negative,  Sore Throat: Negative  Eyes:   Blurred Vision: Negative, Double Vision: Negative, Photophobia: Negative  Eye Pain: Negative, Eye Discharge: Negative, Eye Redness: Negative  Cardiovascular:   Chest Pain: Negative, Palpitations: Negative, Orthopnea: Negative  Claudication: Negative, Leg Swelling: Negative PND: Negative  Respiratory:  Cough: Negative, Hemoptysis: Negative, Sputum Production: Negative  Shortness of Breath: Negative, Wheezing: Negative  Gastrointestinal:  Heartburn: Negative, Nausea: Negative, Vomiting: Negative  Abdominal Pain: y, Diarrhea: Negative, Constipation: Negative  Blood in Stool: Negative, Melena: Negative   Genitourinary:   Dysuria: Negative, Urgency: Negative, Frequency: Negative  Hematuria: Negative, Flank Pain: Negative  Musculoskeletal:   Myalgias: y, Neck Pain: y, Back Pain: y  Joint Pain: Negative, Falls: Negative  Endo/Heme/Allergies:   Easy Bruise/Blood: Negative, Env. Allergies: Negative, Polydipsia: Negative       Recent Results (from the past 168 hour(s))   CBC WITH AUTOMATED DIFF    Collection Time: 01/17/17  9:46 PM   Result Value Ref Range    WBC 12.8 4.6 - 13.2 K/uL    RBC 4.01 (L) 4.20 - 5.30 M/uL    HGB 11.1 (L) 12.0 - 16.0 g/dL    HCT 32.9 (L) 35.0 - 45.0 %    MCV 82.0 74.0 - 97.0 FL    MCH 27.7 24.0 - 34.0 PG    MCHC 33.7 31.0 - 37.0 g/dL    RDW 13.4 11.6 - 14.5 %    PLATELET 119 (H) 197 - 420 K/uL    MPV 8.9 (L) 9.2 - 11.8 FL    NEUTROPHILS 71 42 - 75 %    LYMPHOCYTES 14 (L) 20 - 51 %    MONOCYTES 15 (H) 2 - 9 %    EOSINOPHILS 0 0 - 5 %    BASOPHILS 0 0 - 3 %    ABS. NEUTROPHILS 9.1 (H) 1.8 - 8.0 K/UL    ABS. LYMPHOCYTES 1.8 0.8 - 3.5 K/UL    ABS. MONOCYTES 1.9 (H) 0 - 1.0 K/UL    ABS. EOSINOPHILS 0.0 0.0 - 0.4 K/UL    ABS. BASOPHILS 0.0 0.0 - 0.06 K/UL    DF MANUAL      PLATELET COMMENTS INCREASED PLATELETS      RBC COMMENTS HYPOCHROMIA  1+       METABOLIC PANEL, COMPREHENSIVE    Collection Time: 01/17/17  9:46 PM   Result Value Ref Range    Sodium 133 (L) 136 - 145 mmol/L    Potassium 3.6 3.5 - 5.5 mmol/L    Chloride 97 (L) 100 - 108 mmol/L    CO2 30 21 - 32 mmol/L    Anion gap 6 3.0 - 18 mmol/L    Glucose 108 (H) 74 - 99 mg/dL    BUN 26 (H) 7.0 - 18 MG/DL    Creatinine 0.95 0.6 - 1.3 MG/DL    BUN/Creatinine ratio 27 (H) 12 - 20      GFR est AA >60 >60 ml/min/1.73m2    GFR est non-AA 57 (L) >60 ml/min/1.73m2    Calcium 10.4 (H) 8.5 - 10.1 MG/DL    Bilirubin, total 0.6 0.2 - 1.0 MG/DL    ALT 38 13 - 56 U/L     (H) 15 - 37 U/L    Alk.  phosphatase 66 45 - 117 U/L    Protein, total 8.7 (H) 6.4 - 8.2 g/dL    Albumin 2.9 (L) 3.4 - 5.0 g/dL    Globulin 5.8 (H) 2.0 - 4.0 g/dL    A-G Ratio 0.5 (L) 0.8 - 1.7     MAGNESIUM    Collection Time: 01/17/17  9:46 PM Result Value Ref Range    Magnesium 2.1 1.8 - 2.4 mg/dL   PROTHROMBIN TIME + INR    Collection Time: 01/17/17  9:46 PM   Result Value Ref Range    Prothrombin time 21.2 (H) 11.5 - 15.2 sec    INR 1.9 (H) 0.8 - 1.2     PTT    Collection Time: 01/17/17  9:46 PM   Result Value Ref Range    aPTT 35.9 23.0 - 36.4 SEC   CARDIAC PANEL,(CK, CKMB & TROPONIN)    Collection Time: 01/17/17  9:46 PM   Result Value Ref Range     26 - 192 U/L    CK - MB 2.9 0.5 - 3.6 ng/ml    CK-MB Index 2.0 0.0 - 4.0 %    Troponin-I, Qt. 0.02 0.0 - 0.045 NG/ML   EKG, 12 LEAD, INITIAL    Collection Time: 01/17/17  9:50 PM   Result Value Ref Range    Ventricular Rate 103 BPM    Atrial Rate 103 BPM    P-R Interval 158 ms    QRS Duration 126 ms    Q-T Interval 348 ms    QTC Calculation (Bezet) 455 ms    Calculated P Axis 28 degrees    Calculated R Axis -41 degrees    Calculated T Axis 118 degrees    Diagnosis       Sinus tachycardia  Left axis deviation  Left bundle branch block  Abnormal ECG  When compared with ECG of 30-JUN-2012 07:01,  ST elevation has replaced ST depression in Anterior leads  T wave inversion now evident in Lateral leads  Confirmed by Nancy Villarreal ([id number]) on 1/18/2017 7:58:45 PM     URINALYSIS W/ RFLX MICROSCOPIC    Collection Time: 01/17/17 11:55 PM   Result Value Ref Range    Color YELLOW      Appearance CLEAR      Specific gravity >1.030 (H) 1.005 - 1.030    pH (UA) 5.5 5.0 - 8.0      Protein NEGATIVE  NEG mg/dL    Glucose NEGATIVE  NEG mg/dL    Ketone NEGATIVE  NEG mg/dL    Bilirubin NEGATIVE  NEG      Blood NEGATIVE  NEG      Urobilinogen 1.0 0.2 - 1.0 EU/dL    Nitrites NEGATIVE  NEG      Leukocyte Esterase TRACE (A) NEG     URINE MICROSCOPIC ONLY    Collection Time: 01/17/17 11:55 PM   Result Value Ref Range    WBC 3 to 5 0 - 4 /hpf    RBC NEGATIVE  0 - 5 /hpf    Epithelial cells FEW 0 - 5 /lpf    Bacteria 1+ (A) NEG /hpf    Uric Acid Crystals 2+ (A) NEG   MRSA SCREEN - PCR (NASAL)    Collection Time: 01/18/17 7:00 AM   Result Value Ref Range    Special Requests: NO SPECIAL REQUESTS      Culture result:        MRSA target DNA is not detected (presumptive not colonized with MRSA)   METABOLIC PANEL, BASIC    Collection Time: 01/18/17  9:12 AM   Result Value Ref Range    Sodium 134 (L) 136 - 145 mmol/L    Potassium 3.7 3.5 - 5.5 mmol/L    Chloride 99 (L) 100 - 108 mmol/L    CO2 28 21 - 32 mmol/L    Anion gap 7 3.0 - 18 mmol/L    Glucose 87 74 - 99 mg/dL    BUN 21 (H) 7.0 - 18 MG/DL    Creatinine 0.68 0.6 - 1.3 MG/DL    BUN/Creatinine ratio 31 (H) 12 - 20      GFR est AA >60 >60 ml/min/1.73m2    GFR est non-AA >60 >60 ml/min/1.73m2    Calcium 9.5 8.5 - 10.1 MG/DL   MAGNESIUM    Collection Time: 01/18/17  9:12 AM   Result Value Ref Range    Magnesium 1.9 1.8 - 2.4 mg/dL   PROTHROMBIN TIME + INR    Collection Time: 01/18/17  6:00 PM   Result Value Ref Range    Prothrombin time 25.2 (H) 11.5 - 15.2 sec    INR 2.4 (H) 0.8 - 1.2     CULTURE, BLOOD    Collection Time: 01/18/17  7:13 PM   Result Value Ref Range    Special Requests: NO SPECIAL REQUESTS      Culture result: NO GROWTH 2 DAYS     CULTURE, BLOOD    Collection Time: 01/18/17  7:19 PM   Result Value Ref Range    Special Requests: NO SPECIAL REQUESTS      Culture result: NO GROWTH 2 DAYS     CBC WITH AUTOMATED DIFF    Collection Time: 01/19/17  3:53 AM   Result Value Ref Range    WBC 11.8 4.6 - 13.2 K/uL    RBC 3.29 (L) 4.20 - 5.30 M/uL    HGB 8.9 (L) 12.0 - 16.0 g/dL    HCT 27.3 (L) 35.0 - 45.0 %    MCV 83.0 74.0 - 97.0 FL    MCH 27.1 24.0 - 34.0 PG    MCHC 32.6 31.0 - 37.0 g/dL    RDW 13.6 11.6 - 14.5 %    PLATELET 782 800 - 874 K/uL    MPV 8.8 (L) 9.2 - 11.8 FL    NEUTROPHILS 74 (H) 40 - 73 %    LYMPHOCYTES 14 (L) 21 - 52 %    MONOCYTES 12 (H) 3 - 10 %    EOSINOPHILS 0 0 - 5 %    BASOPHILS 0 0 - 2 %    ABS. NEUTROPHILS 8.7 (H) 1.8 - 8.0 K/UL    ABS. LYMPHOCYTES 1.6 0.9 - 3.6 K/UL    ABS. MONOCYTES 1.5 (H) 0.05 - 1.2 K/UL    ABS. EOSINOPHILS 0.0 0.0 - 0.4 K/UL    ABS. BASOPHILS 0.0 0.0 - 0.1 K/UL    DF AUTOMATED     METABOLIC PANEL, COMPREHENSIVE    Collection Time: 01/19/17  3:53 AM   Result Value Ref Range    Sodium 134 (L) 136 - 145 mmol/L    Potassium 3.6 3.5 - 5.5 mmol/L    Chloride 101 100 - 108 mmol/L    CO2 27 21 - 32 mmol/L    Anion gap 6 3.0 - 18 mmol/L    Glucose 119 (H) 74 - 99 mg/dL    BUN 16 7.0 - 18 MG/DL    Creatinine 0.79 0.6 - 1.3 MG/DL    BUN/Creatinine ratio 20 12 - 20      GFR est AA >60 >60 ml/min/1.73m2    GFR est non-AA >60 >60 ml/min/1.73m2    Calcium 8.9 8.5 - 10.1 MG/DL    Bilirubin, total 0.5 0.2 - 1.0 MG/DL    ALT 22 13 - 56 U/L    AST 58 (H) 15 - 37 U/L    Alk.  phosphatase 47 45 - 117 U/L    Protein, total 6.3 (L) 6.4 - 8.2 g/dL    Albumin 1.9 (L) 3.4 - 5.0 g/dL    Globulin 4.4 (H) 2.0 - 4.0 g/dL    A-G Ratio 0.4 (L) 0.8 - 1.7     MAGNESIUM    Collection Time: 01/19/17  3:53 AM   Result Value Ref Range    Magnesium 1.9 1.8 - 2.4 mg/dL   PROTHROMBIN TIME + INR    Collection Time: 01/19/17  3:53 AM   Result Value Ref Range    Prothrombin time 24.1 (H) 11.5 - 15.2 sec    INR 2.3 (H) 0.8 - 1.2     CA 27.29    Collection Time: 01/19/17 12:10 PM   Result Value Ref Range    CA 27.29 49.4 (H) 0.0 - 38.6 U/mL   FFP, ALLOCATE    Collection Time: 01/19/17  1:00 PM   Result Value Ref Range    CALLED TO:       MARYCHUY BOLAÑOS [address] [place] [address], AT 16:13 ON 1/19/2017 BY [facility]    Unit number [id number]     Blood component type FP24H,THAW     Unit division 00     Status of unit ISSUED     Unit number [id number]     Blood component type FP24H,THAW     Unit division 00     Status of unit ISSUED    TYPE & SCREEN    Collection Time: 01/19/17  1:30 PM   Result Value Ref Range    Crossmatch Expiration 01/22/2017     ABO/Rh(D) Debby Charles NEGATIVE     Antibody screen NEG    VANCOMYCIN, TROUGH    Collection Time: 01/20/17  5:03 AM   Result Value Ref Range    Vancomycin,trough 2.5 (L) 10.0 - 20.0 ug/mL    Reported dose date: 20170119      Reported dose time: 1100      Reported dose: 1000 MG UNITS   METABOLIC PANEL, BASIC    Collection Time: 01/20/17  5:03 AM   Result Value Ref Range    Sodium 136 136 - 145 mmol/L    Potassium 3.8 3.5 - 5.5 mmol/L    Chloride 101 100 - 108 mmol/L    CO2 28 21 - 32 mmol/L    Anion gap 7 3.0 - 18 mmol/L    Glucose 116 (H) 74 - 99 mg/dL    BUN 13 7.0 - 18 MG/DL    Creatinine 0.67 0.6 - 1.3 MG/DL    BUN/Creatinine ratio 19 12 - 20      GFR est AA >60 >60 ml/min/1.73m2    GFR est non-AA >60 >60 ml/min/1.73m2    Calcium 9.3 8.5 - 10.1 MG/DL   CBC WITH AUTOMATED DIFF    Collection Time: 01/20/17  5:03 AM   Result Value Ref Range    WBC 12.3 4.6 - 13.2 K/uL    RBC 3.36 (L) 4.20 - 5.30 M/uL    HGB 9.0 (L) 12.0 - 16.0 g/dL    HCT 27.5 (L) 35.0 - 45.0 %    MCV 81.8 74.0 - 97.0 FL    MCH 26.8 24.0 - 34.0 PG    MCHC 32.7 31.0 - 37.0 g/dL    RDW 13.5 11.6 - 14.5 %    PLATELET 814 923 - 421 K/uL    MPV 9.0 (L) 9.2 - 11.8 FL    NEUTROPHILS 86 (H) 40 - 73 %    LYMPHOCYTES 8 (L) 21 - 52 %    MONOCYTES 6 3 - 10 %    EOSINOPHILS 0 0 - 5 %    BASOPHILS 0 0 - 2 %    ABS. NEUTROPHILS 10.6 (H) 1.8 - 8.0 K/UL    ABS. LYMPHOCYTES 0.9 0.9 - 3.6 K/UL    ABS. MONOCYTES 0.8 0.05 - 1.2 K/UL    ABS. EOSINOPHILS 0.0 0.0 - 0.4 K/UL    ABS. BASOPHILS 0.0 0.0 - 0.1 K/UL    DF AUTOMATED     MAGNESIUM    Collection Time: 01/20/17  5:03 AM   Result Value Ref Range    Magnesium 2.0 1.8 - 2.4 mg/dL   PROTHROMBIN TIME + INR    Collection Time: 01/20/17  5:03 AM   Result Value Ref Range    Prothrombin time 16.6 (H) 11.5 - 15.2 sec    INR 1.4 (H) 0.8 - 1.2         No past medical history on file. No past surgical history on file. Social History     Social History    Marital status: UNKNOWN     Spouse name: N/A    Number of children: N/A    Years of education: N/A     Occupational History    Not on file.      Social History Main Topics    Smoking status: Not on file    Smokeless tobacco: Not on file    Alcohol use Not on file    Drug use: Not on file    Sexual activity: Not on file     Other Topics Concern    Not on file     Social History Narrative       No family history on file.     Allergies   Allergen Reactions    Morphine Nausea and Vomiting       Follow-up Disposition: Not on File    Barby Padilla MD

## 2017-01-20 NOTE — PROGRESS NOTES
vss afeb  Neuro intact  Pain controlled  For biopsy today  States that if biopsy confirms cancer she feels she will want to go ahead with stabilization surgery if that will allow her best chance to ambulate and get out of hospital.  She understands that surgery is not curative. Radiation therapy would not stabilize spine and would not decrease pain or allow mobilization. It would be done after stabilization to diminish tumor load. RBCA discussed. Will make preliminary plans for surgery next week, pending patients ultimate decision. Prognosis still poor.

## 2017-01-20 NOTE — PROGRESS NOTES
Have scheduled surgery for Monday. Surgery is an Occipital Cervical fusion  Will likely go to ICU post op  Will need to transfuse pre op and maximize coags. Given hct 27, would likely transfuse 2 units preop.

## 2017-01-21 NOTE — PROGRESS NOTES
Ortho    Pt seen and evaluated  Symptoms unchanged  No cp, sob, abd pain    Visit Vitals    /67 (BP 1 Location: Left arm, BP Patient Position: At rest)    Pulse 84    Temp 97.6 °F (36.4 °C)    Resp 20    Ht 5' 3\" (1.6 m)    Wt 200 lb (90.7 kg)    SpO2 94%    Breastfeeding No    BMI 35.43 kg/m2     nv intact  No cce    Labs  Lab Results   Component Value Date/Time    WBC 14.0 01/21/2017 04:29 AM    HGB 8.7 01/21/2017 04:29 AM    HCT 26.4 01/21/2017 04:29 AM    PLATELET 103 57/43/3151 04:29 AM    MCV 81.5 01/21/2017 04:29 AM     P:  Plan for surgery Monday. Will dc lovenox after 1/22 am dose. Npo after mn 1/22.

## 2017-01-21 NOTE — PROGRESS NOTES
GEN SURG  POD # 1 S/P  LEFT BREAST EXCISION BIOPSY X 2  Feels well, no pains to biopsy site  Complains of neck discomfort    Rx Total  Mastectomy when stable from neck procedure        Path : Pending

## 2017-01-21 NOTE — PROGRESS NOTES
SUBJECTIVE:    Continues to have Neck pain. No chest or abdominal pain. No SOB or cough. No NV/D. OBJECTIVE:    Visit Vitals    /72 (BP 1 Location: Left arm, BP Patient Position: At rest)    Pulse 92    Temp 98.2 °F (36.8 °C)    Resp 18    Ht 5' 3\" (1.6 m)    Wt 90.7 kg (200 lb)    SpO2 91%    Breastfeeding No    BMI 35.43 kg/m2     Neck: C-collar in situ  CVS: RRR  Chest: ulcerative left mass, dressing in situ  RS:CTA bilaterally  GI: ND, NT, BS +  Extremities: No pedal edema  General: Awake,NAD, follows commands  CNS: Motor strength 5/5 in LEs. ASSESSMENT:    1. Osteolytic lesion C2 to C4 with pathologic fracture C3 with moderate cord compression C2-C4. 2. Metastatic left Breast mass including bone, lungs, adrenal.  3. Hx of PE on warfarin. 4. Leg pain, no DVT  5. Elevated AST, improving. 6. Low grade fever, resolved  7. Moderate protein malnutrition   8.  Leukocytosis sec to steroid    PLAN:    Cont antibiotic and monitor woundc/s  Pending biopsy report  Wound c/s ordered  Spine surgery, oncology to follow    --> total time greater than 30 minutes    CMP:   Lab Results   Component Value Date/Time     01/21/2017 04:29 AM    K 4.0 01/21/2017 04:29 AM     01/21/2017 04:29 AM    CO2 27 01/21/2017 04:29 AM    AGAP 7 01/21/2017 04:29 AM     (H) 01/21/2017 04:29 AM    BUN 16 01/21/2017 04:29 AM    CREA 0.66 01/21/2017 04:29 AM    GFRAA >60 01/21/2017 04:29 AM    GFRNA >60 01/21/2017 04:29 AM    CA 9.0 01/21/2017 04:29 AM     CBC:   Lab Results   Component Value Date/Time    WBC 14.0 (H) 01/21/2017 04:29 AM    HGB 8.7 (L) 01/21/2017 04:29 AM    HCT 26.4 (L) 01/21/2017 04:29 AM     01/21/2017 04:29 AM

## 2017-01-21 NOTE — PROGRESS NOTES
GEN SURG  Friends in room, some neck discomfort  L breast: Dressing clean .  Dry    Rx: Total Mastectomy when stable from Cervical spine procedure

## 2017-01-21 NOTE — ROUTINE PROCESS
Bedside and Verbal shift change report given to Genesis Garibay RN (oncoming nurse) by Arti Prado LPN (offgoing nurse). Report included the following information SBAR, Kardex, MAR and Recent Results. SITUATION:    Code Status: DNR  Reason for Admission: Cervical spine instability [M53.2X2]  Cervical spinal cord compression ([facility] [G95.20]   Cancer, metastatic to bone [facility]) [C79.51]    [facility] day: 3   Problem List:       Hospital Problems  Date Reviewed: 1/19/2017          Codes Class Noted POA    Malignant neoplasm of upper-outer quadrant of left female breast ([facility] [address] ICD-10-CM: C50.412  ICD-9-CM: 174.4  1/19/2017 Yes        Bone metastasis ([facility] [address] ICD-10-CM: C79.51  ICD-9-CM: 198.5  1/19/2017 Yes        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 Patient taking anticoagulants no     ASSESSMENT:    Changes in Assessment Throughout Shift: IV's infiltrated, new IV accessed achieved by charge nurse, [place] Patient has Central Line: no Reasons if yes:    Patient has Negro Cath: no Reasons if yes:       Last Vitals:     Vitals:    01/20/17 0401 01/20/17 0947 01/20/17 1304 01/20/17 1604   BP: 123/58 152/69 144/67 148/75   Pulse: 86 96 96 95   Resp: 18 18 18 18   Temp: 96.9 °F (36.1 °C) 97.3 °F (36.3 °C) 98 °F (36.7 °C) 98.5 °F (36.9 °C)   SpO2: 94% 97% 92% 92%   Weight:       Height:            IV and DRAINS (will only show if present)   [REMOVED] Peripheral IV 01/19/17 Left; Inner Arm-Site Assessment: Clean, dry, & intact  [REMOVED] Peripheral IV 01/19/17 Left Hand-Site Assessment: Clean, dry, & intact  [REMOVED] Peripheral IV 01/19/17 Right Arm-Site Assessment: Clean, dry, & intact  Peripheral IV 01/20/17 Left Forearm-Site Assessment: Clean, dry, & intact  [REMOVED] Peripheral IV 01/17/17 Left Antecubital-Site Assessment: Clean, dry, & intact     WOUND (if present)   Wound Type:  Left breast tumor, Cancer   Dressing present Dressing Present : No   Wound Concerns/Notes:  Dressing clean dry intact,      PAIN    Pain Assessment    Pain Intensity 1: 0 (01/20/17 1600)    Pain Location 1: Neck    Pain Intervention(s) 1: Medication (see MAR)    Patient Stated Pain Goal: 0  o Interventions for Pain:  Pain meds prn  o Intervention effective: yes  o Time of last intervention: 2332   o Reassessment Completed: yes      Last 3 Weights:  Last 3 Recorded Weights in this Encounter    01/17/17 1713   Weight: 90.7 kg (200 lb)     Weight change:      INTAKE/OUPUT    Current Shift:      Last three shifts: 01/19 0701 - 01/20 1900  In: 852.9 [P.O.:210]  Out: 850 [Urine:850]     LAB RESULTS     Recent Labs      01/20/17   0503  01/19/17   0353  01/17/17   2146   WBC  12.3  11.8  12.8   HGB  9.0*  8.9*  11.1*   HCT  27.5*  27.3*  32.9*   PLT  380  396  502*        Recent Labs      01/20/17   0503  01/19/17   0353  01/18/17   1800  01/18/17   0912   NA  136  134*   --   134*   K  3.8  3.6   --   3.7   GLU  116*  119*   --   87   BUN  13  16   --   21*   CREA  0.67  0.79   --   0.68   CA  9.3  8.9   --   9.5   MG  2.0  1.9   --   1.9   INR  1.4*  2.3*  2.4*   --        RECOMMENDATIONS AND DISCHARGE PLANNING     1. Pending tests/procedures/ Plan of Care or Other Needs: breast biopsy, decisions to be made after biopsy results      Discharge plan for patient and Needs/Barriers: dc home after procedures  2. Estimated Discharge Date: 01/27/17 Posted on Whiteboard in Patients Room: no      4. The patient's care plan was reviewed with the oncoming nurse.        \"HEALS\" SAFETY CHECK      Fall Risk    Total Score: 3    Safety Measures: Safety Measures: Bed/Chair alarm on, Bed/Chair-Wheels locked, Bed in low position    A safety check occurred in the patient's room between off going nurse and oncoming nurse listed above. The safety check included the below items  Area Items   H  High Alert Medications - Verify all high alert medication drips (heparin, PCA, etc.)   E  Equipment - Suction is set up for ALL patients (with madhav)  - Red plugs utilized for all equipment (IV pumps, etc.)  - WOWs wiped down at end of shift.  - Room stocked with oxygen, suction, and other unit-specific supplies   A  Alarms - Bed alarm is set for fall risk patients  - Ensure chair alarm is in place and activated if patient is up in a chair   L  Lines - Check IV for any infiltration  - Negro bag is empty if patient has a Negro   - Tubing and IV bags are labeled   S  Safety   - Room is clean, patient is clean, and equipment is clean. - Hallways are clear from equipment besides carts. - Fall bracelet on for fall risk patients  - Ensure room is clear and free of clutter  - Suction is set up for ALL patients (with madhav)  - Hallways are clear from equipment besides carts.    - Isolation precautions followed, supplies available outside room, sign posted     Preet Mehta

## 2017-01-22 NOTE — PROGRESS NOTES
vss afeb  Discussed with patient scheduled surgery for tommorow. Ludivina Allen to go ahead  RBCA reviewed  Questions answered  Consent obtained.

## 2017-01-22 NOTE — PROGRESS NOTES
GEN SURG   Nurse and friends in room  Patient is short of Breath, slightly agitated and Tachypneic  Patient took off Neck collar- does not like it  Denies pain to Left breast, dressing intact  Negative Homans sign    Rec: Check for PE( Hypercoagulable sec to CA)           Spoke with Dr. Mark Haley

## 2017-01-22 NOTE — ROUTINE PROCESS
Pt returned to floor from CT. I noticed a change in the patients respiratory  status and a Rapid response was called.

## 2017-01-22 NOTE — PROGRESS NOTES
SUBJECTIVE:    She states she has neck pain but no chest pain or abdominal pain. She states she got a bad treatment from staff yesterday as I told them to do so and I made her sign DNR. I told her that I never made you sign up DNR and I can talk to you to avoid any misunderstanding she may have about the care. She states she does not want to talk about it and will go for surgery tomorrow. Addendum:  Got a call from nursing to inform that patient has tachycardia. CTA ordered, start heparin drip and NS. ABG ordered. Patient wishes to be a full code. OBJECTIVE:    Visit Vitals    /83 (BP 1 Location: Right arm, BP Patient Position: At rest)    Pulse 82    Temp 98.1 °F (36.7 °C)    Resp 16    Ht 5' 3\" (1.6 m)    Wt 90.7 kg (200 lb)    SpO2 94%    Breastfeeding No    BMI 35.43 kg/m2     CVS: RRR  Chest: ulcerative left mass, dressing in situ  RS:CTA bilaterally  GI: ND, NT, BS +  Extremities: No pedal edema  General: Awake,NAD, follows commands  CNS: Motor strength 5/5 in LEs. ASSESSMENT:    1. Osteolytic lesion C2 to C4 with pathologic fracture C3 with moderate cord compression C2-C4. 2. Metastatic left Breast mass including bone, lungs, adrenal.  3. Hx of PE on warfarin. 4. Leg pain, no DVT  5. Elevated AST, improving. 6. Low grade fever, resolved  7. Moderate protein malnutrition   8. Leukocytosis sec to steroid, better    PLAN:    Cont antibiotic for a total of 5 days. Patient refused to have wound c/s done yesterday per nursing.    Pending biopsy report  Spine surgery, general surgery, and oncology to follow    --> total time greater than 30 minutes    CMP:   Lab Results   Component Value Date/Time     01/22/2017 02:56 AM    K 4.1 01/22/2017 02:56 AM     01/22/2017 02:56 AM    CO2 30 01/22/2017 02:56 AM    AGAP 7 01/22/2017 02:56 AM     (H) 01/22/2017 02:56 AM    BUN 21 (H) 01/22/2017 02:56 AM    CREA 0.80 01/22/2017 02:56 AM    GFRAA >60 01/22/2017 02:56 AM GFRNA >60 01/22/2017 02:56 AM    CA 9.4 01/22/2017 02:56 AM     CBC:   Lab Results   Component Value Date/Time    WBC 12.7 01/22/2017 02:56 AM    HGB 8.9 (L) 01/22/2017 02:56 AM    HCT 27.2 (L) 01/22/2017 02:56 AM     01/22/2017 02:56 AM

## 2017-01-22 NOTE — PROGRESS NOTES
[facility] Rapid Response Note    RRT called for respiratory distress, per nursing SOB x2 hours, but is worsening, hypoxia. Nursing reported the patient just came back from imaging study. Pt denies CP. She c/o feeling hot, and neck pain. She was initially declining exam, did not want anyone in her room. Patient Vitals for the past 8 hrs:   Temp Pulse Resp BP SpO2   01/22/17 1629 97.8 °F (36.6 °C) 98 17 151/79 96 %   01/22/17 1235 98.3 °F (36.8 °C) (!) 111 16 144/71 96 %         PE:  Gen: Obese but frail appearing elderly female in moderate distress, ill appearing. Cardiac: Regular tachycardia. Pulm: Labored breathing, use of accessory muscles, abdominal breathing, mild wheezes throughout. Abd: ND, soft. Skin: Warm, sweating. Neuro: Alert, speech clear. 1/22/17 CTA:   IMPRESSION:  Small burden of pulmonary embolism in multiple subsegmental vessels both lower lobes. New small pleural effusions. Pulmonary, osseous, vinay and left adrenal metastases. Large left breast mass. Subsegmental atelectasis at the lung bases. ABG pH 7.398, pCO@ 49.2, pO2 80, HCO3 30 on NRB. A/P:  PE; completed CTA earlier today, and heparin drip had been ordered. Hx of known metastatic malignancy. Moderate respiratory distress; secondary to above, gas does not look bad w/ increased supplemental O2, likely chronic retainer, recs for duoneb, trial of Bipap looks like she will fatigue. EGK w/ LBBB present on 1/17/17 comparison, checking cardiac enzymes. POC glucose of 135. Attending physician Dr. Katie Chung, notified of pt condition, recs to give dose of Lovenox which he has ordered, then start heparin drip w/out bolus and transfer to ICU. These orders were discussed w/ pt's nurse. Dr. Katie Chung will talk directly w/ the ICU attending, and place lab orders himself. I did order for STAT coags in preparation for heparin drip. Patient and family updated of test results, treatment, transfer.   When asked if she would want intubation if resp status declined, she stated \"no\", apparently pt had changed her mind regarding code status earlier today. Pt advised this is important to know, recs to discuss this further with her family at bedside, and to let ICU attending know her decision; Dr. Jean Claude Spann. Arely Hodge is aware. Nursing supervisor aware of ICU transfer.         Stephanie Guthrie MD PGY-3  [facility] [address]

## 2017-01-22 NOTE — PROGRESS NOTES
I got a call from RRT regarding tachypnea, hypoxic and tachycardia. CTA is positive for PE. Started on BiPAP and heparin. Talked to dr. Tanvir Duvall -will accept patient to ICU. Talked to nursing supervisor. Talked to ICU nurse in charge. Talked to patient's niece, Ms. Janell Kim, over the phone and she is okay with patient moving to ICU and she is aware that patient is too sick and risk for sudden cardiac death. However, patient rescind her DNR ans family is okay with patient being full code at this time.

## 2017-01-23 PROBLEM — R53.81 DEBILITY: Status: ACTIVE | Noted: 2017-01-01

## 2017-01-23 PROBLEM — I26.99 PULMONARY EMBOLISM (HCC): Status: ACTIVE | Noted: 2017-01-01

## 2017-01-23 NOTE — PROGRESS NOTES
attended the interdisciplinary rounds for Shanda Dao, who is a 68 y.o.,female. Patients Primary Language is: [place]. According to the patients EMR [employer] Affiliation is: Nicolas Verdin.     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 See flow sheets for interventions. Plan:  Chaplains will continue to follow and will provide pastoral care on an as needed/requested basis.  recommends bedside caregivers page  on duty if patient shows signs of acute spiritual or emotional distress.     [address]  Board Certified [address] [facility]   [phone]

## 2017-01-23 NOTE — CONSULTS
[facility]: [phone]  [facility]: [address] [place] [address] Way: [phone]    Patient Name: Mikal Harper  YOB: 1940    Date of Initial Consult: 1/19/17  Reason for Consult: goals of care  Requesting Provider: Dr. Rai Durand  Primary Care Physician: Marcello Rodriguez MD      SUMMARY:   Mikal Harper is a 68y.o. year old with a past history of PE on warfarin, who was admitted on 1/17/2017 from home with a diagnosis of neck pain and left breast mass. Current medical issues leading to Palliative Medicine involvement include: goals of care, Mass left breast and Destructive lesion C2/3/4/5 concerning for metastatic disease causing cord compression. She has left Breast mass and concern for mets to bone, lungs, adrenal.     PALLIATIVE DIAGNOSES:   1. Left Breast mass   2. Destructive lesion C2/3/4/5 concerning for metastatic disease causing cord compression. 3. Pulmonary embolism  4. Debility     PLAN:   1. Follow up with patient and family (cousins x 2, 2nd cousin) along with oncology, ICU team.  Patient verbalizes awareness of severity of situation and accepting of poor prognosis. She has heard clearly that comfort / hospice is recommendation from medical team and accepting of it at this point. Introduced conversations of comfort measures only but she is not ready to make the transition to comfort only here at the hospital today as it is so too much to discuss. Ms. Katlin aRya is requesting time to process current information. 2. Ms. Katlin Raya does decide this date that she would like to return to DNR/DNI in case of cardiac arrest and NO INTUBATION under any circumstance. DNR ordered entered to reflect. Patient ultimately states that \"all of that\" (life sustaining measures) would not be good at this point.   If patient has decline in condition - please follow on conversations regarding comfort goal.    3. Discussed transition outside of the hospital with patient and family. She shares it would be too much to talk about this today but agrees to discuss with case management options for discharge with comfort goal.  Spoke with case management [facility]) whom will follow up with patient on 01/24. Palliative medicine will follow up on patient tomorrow 01/24.   4. Initial consult note routed to primary continuity provider  5.  Communicated plan of care with: Palliative IDT, patient, family at bedside (with patient permission), ICU team     GOALS OF CARE:     [====Goals of Care====]  GOALS OF CARE:  Patient / health care proxy stated goals: to process what is going on    TREATMENT PREFERENCES:   Code Status: DNR    Advance Care Planning:  Advance Care Planning 1/18/2017   Patient's Healthcare Decision Maker is: Legal Next of Gilma Flynn   Primary Decision Maker Name Jose Boykin (cousin)   Primary Decision Maker Phone Number [phone] or [phone]   Primary Decision Maker Relationship to Patient Other relative   Secondary Decision Maker Name Diane Half (cousin)   Secondary Decision Maker Phone Number [phone] or [phone]   Secondary Decision Maker Relationship to Patient Other relative   Confirm Advance Directive None   Patient Would Like to Complete Advance Directive Yes   Does the patient have other document types Do Not Resuscitate     Other:    The palliative care team has discussed with patient / health care proxy about goals of care / treatment preferences for patient.  [====Goals of Care====]    Advance Care Planning 1/18/2017   Patient's Healthcare Decision Maker is: Legal Next of Gilma Flynn   Primary Decision Maker Name Jose Boykin (cousin)   Primary Decision Maker Phone Number [phone] or [phone]   Primary Decision Maker Relationship to Patient Other relative   Secondary Decision Maker Name Diane Half (cousin)   Secondary Decision Maker Phone Number [phone] or [phone]   Secondary Decision Maker Relationship to Patient Other relative   Confirm Advance Directive None   Patient Would Like to Complete Advance Directive Yes   Does the patient have other document types Do Not Resuscitate       HISTORY:     History obtained from: patient    CHIEF COMPLAINT: Shanda Dao is a 68y.o. year old with a past history of PE on warfarin, who was admitted on 1/17/2017 from home with a diagnosis of neck pain and left breast mass. Current medical issues leading to Palliative Medicine involvement include: goals of care, Mass left breast and Destructive lesion C2/3/4/5 concerning for metastatic disease causing cord compression. She has left Breast mass and concern for mets to bone, lungs, adrenal.    HPI/SUBJECTIVE:    The patient is:   [x] Verbal and participatory  [] Non-participatory due to:      FUNCTIONAL ASSESSMENT:     Palliative Performance Scale (PPS): 50          PSYCHOSOCIAL/SPIRITUAL SCREENING:     Advance Care Planning:  Advance Care Planning 1/18/2017   Patient's Healthcare Decision Maker is: Legal Next of Gilma 69   Primary Decision Maker Name Yong Dennison (cousin)   Primary Decision Maker Phone Number [phone] or [phone]   Primary Decision Maker Relationship to Patient Other relative   Secondary Decision Maker Name Corbin Chapa (cousin)   Secondary Decision Maker Phone Number [phone] or [phone]   Secondary Decision Maker Relationship to Patient Other relative   Confirm Advance Directive None   Patient Would Like to Complete Advance Directive Yes   Does the patient have other document types Do Not Resuscitate      Any spiritual / Scientology concerns:  [] Yes /  [x] No    Caregiver Burnout:  [] Yes /  [x] No /  [] No Caregiver Present      Anticipatory grief assessment:   [x] Normal  / [] Maladaptive       ESAS Anxiety:      ESAS Depression:         REVIEW OF SYSTEMS:     Positive and pertinent negative findings in ROS are noted above in HPI.   The following systems were [x] reviewed / [] unable to be reviewed as noted in HPI  Constitutional - no current complaints  Respiratory - denies shortness of breath, \"today is much better than yesterday\"  Cardiac - denies chest pain   Gastrointestinal - Denies n/v/constipation  Musculoskeletal - admits to neck pain which is relieved with percocet; she is agreeable to wearing neck brace when not eating / drinking  Psychiatric - admits to anxiety, especially with a great deal of information    Modified ESAS Completed by: provider                                   Stool Occurrence(s): 0        PHYSICAL EXAM:     Wt Readings from Last 3 Encounters:   01/22/17 86.9 kg (191 lb 9.3 oz)   01/16/17 90.7 kg (200 lb)     Blood pressure 127/55, pulse 84, temperature 97.8 °F (36.6 °C), resp. rate 20, height 5' 3\" (1.6 m), weight 86.9 kg (191 lb 9.3 oz), SpO2 99 %, not currently breastfeeding. Pain:  Pain Scale 1: Numeric (0 - 10)  Pain Intensity 1: 0     Pain Location 1: Neck  Pain Orientation 1: Posterior  Pain Description 1: Aching, Sharp  Pain Intervention(s) 1: Medication (see MAR) (percocet 1 tab po)    Constitutional:  Alert, NAD  Eyes: pupils equal, anicteric  ENMT: no nasal discharge, moist mucous membranes  Cardiovascular: regular rhythm, distal pulses intact  Respiratory: breathing not labored  Gastrointestinal: soft non-tender, +bowel sounds  Musculoskeletal: little movement of neck d/t destructive lesion  Skin: warm, dry  Neurologic: following commands, moving all extremities  Psychiatric: full affect, oriented x 4     HISTORY:     Active Problems:    Neck mass (1/17/2017)      Malignant neoplasm of upper-outer quadrant of left female breast ([address] (1/19/2017)      Bone metastasis ([address] (1/19/2017)      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    No past surgical history on file.   No family history on file. History reviewed, no pertinent family history.   Social History   Substance Use Topics    Smoking status: Not on file    Smokeless tobacco: Not on file    Alcohol use Not on file     Allergies   Allergen Reactions    Morphine Nausea and Vomiting      Current Facility-Administered Medications   Medication Dose Route Frequency    glucose chewable tablet 16 g  4 Tab Oral PRN    glucagon (GLUCAGEN) injection 1 mg  1 mg IntraMUSCular PRN    dextrose (D50W) injection syrg 12.5-25 g  25-50 mL IntraVENous PRN    lactated ringers infusion  75 mL/hr IntraVENous CONTINUOUS    sodium chloride (NS) flush 5-10 mL  5-10 mL IntraVENous Q8H    sodium chloride (NS) flush 5-10 mL  5-10 mL IntraVENous PRN    famotidine (PEPCID) tablet 20 mg  20 mg Oral ON CALL TO OR    celecoxib (CELEBREX) capsule 400 mg  400 mg Oral ON CALL TO OR    pregabalin (LYRICA) capsule 50 mg  50 mg Oral ON CALL TO OR    heparin 25,000 units in D5W 250 ml infusion  18-36 Units/kg/hr IntraVENous TITRATE    0.9% sodium chloride infusion  75 mL/hr IntraVENous CONTINUOUS    albuterol-ipratropium (DUO-NEB) 2.5 MG-0.5 MG/3 ML  3 mL Nebulization Q4H RT    LORazepam (ATIVAN) injection 1 mg  1 mg IntraVENous Q6H PRN    0.9% sodium chloride infusion 250 mL  250 mL IntraVENous PRN    dexamethasone (DECADRON) 4 mg/mL injection 4 mg  4 mg IntraVENous Q6H    0.9% sodium chloride infusion 250 mL  250 mL IntraVENous PRN    calcium acetate-aluminum sulf (DOMEBORO) 5 Packet, sodium chloride irrigation 0.9 % 500 mL   Topical BID    ondansetron (ZOFRAN) injection 4 mg  4 mg IntraVENous Q4H PRN    influenza vaccine 2016-17 (36mos+)(PF) (FLUZONE/FLUARIX/FLULAVAL QUAD) injection 0.5 mL  0.5 mL IntraMUSCular PRIOR TO DISCHARGE    HYDROmorphone (DILAUDID) syringe 0.5 mg  0.5 mg IntraVENous Q4H PRN    oxyCODONE-acetaminophen (PERCOCET) 5-325 mg per tablet 1 Tab  1 Tab Oral Q4H PRN    acetaminophen (TYLENOL) tablet 500 mg  500 mg Oral Q6H PRN    famotidine (PEPCID) tablet 20 mg  20 mg Oral BID    docusate sodium (COLACE) capsule 100 mg  100 mg Oral BID    multivitamin, tx-iron-ca-min (THERA-M w/ IRON) tablet 1 Tab  1 Tab Oral DAILY        LAB AND IMAGING FINDINGS:     Lab Results   Component Value Date/Time    WBC 11.8 01/23/2017 12:42 AM    HGB 8.8 01/23/2017 12:42 AM    PLATELET 722 84/77/7413 12:42 AM     Lab Results   Component Value Date/Time    Sodium 137 01/23/2017 12:42 AM    Potassium 4.3 01/23/2017 12:42 AM    Chloride 100 01/23/2017 12:42 AM    CO2 32 01/23/2017 12:42 AM    BUN 20 01/23/2017 12:42 AM    Creatinine 0.72 01/23/2017 12:42 AM    Calcium 9.0 01/23/2017 12:42 AM    Magnesium 2.2 01/23/2017 12:42 AM      Lab Results   Component Value Date/Time    AST 58 01/19/2017 03:53 AM    Alk. phosphatase 47 01/19/2017 03:53 AM    Protein, total 6.3 01/19/2017 03:53 AM    Albumin 1.9 01/19/2017 03:53 AM    Globulin 4.4 01/19/2017 03:53 AM     Lab Results   Component Value Date/Time    INR 1.2 01/23/2017 12:42 AM    Prothrombin time 15.2 01/23/2017 12:42 AM    aPTT 150.4 01/23/2017 05:58 AM      No results found for: IRON, FE, TIBC, IBCT, PSAT, FERR   No results found for: PH, PCO2, PO2  No components found for: [address] [place] [address]   Lab Results   Component Value Date/Time    CK 41 01/23/2017 12:42 AM    CK - MB 1.3 01/23/2017 12:42 AM              Total time: 65 minutes  Counseling / coordination time:  55 minutes  > 50% counseling / coordination?: yes    Prolonged service was provided for  [x]30 min   []75 min in face to face time in the presence of the patient. Time Start: 1:01PM  Time End: 1:56PM  Note: this can only be billed with  (initial) or 21 [phone] (follow up). If multiple start / stop times, list each separately.

## 2017-01-23 NOTE — PROGRESS NOTES
[facility]  ICU Progress Note      Name: Gabby Last   : 1940   MRN: [id number]   Date: 2017 8:24 AM     [x]I have reviewed the flowsheet and previous days notes. Events overnight reviewed and discussed with nursing staff. Vital signs and records reviewed. Subjective:    Pt was on BiPAP last night d/t increased respiratory distress, 2/2 bilat PE found yesterday. Pt was apparently not comfortable with the BiPAP face mask and removed it. Pt is now on supp O2 via NC and is maintaining good oxygenation. Spine surgery for this morning was cancelled d/t finding of PE. No futher events overnight. Upon exam, pt was awake, alert, cooperative and amiable; no acute distress. Pt was made aware that her surgery was cancelled this morning. Pt denies any neck pain or any other pain at this time; denies any SOB, CP, ABD pain, or any other complaints at this time. Pt state she has not had a BM since coming up to ICU. Pain is currently well tolerated with current PO meds. The conversation was started regarding the status of the pt's medical condition and future decisions. The question of DNR status was addressed. Pt was amenable to the conversation and has stated that at this time, she would like to first see if the ortho surgery to stabilize her neck is able to occur. Pt is aware of her poor prognosis overall, and is willing to have further conversations about her DNR status and future care plans after a clear medical plan is established with regard to the bilat PE's and surgery options. Pt does state that she would like the quality of life and mobility that the surgery would potentially offer her. She also states that she has recently reconnected with some family members, which is causing her some hesitation in confirming the DNR at this time. ROS:A comprehensive review of systems was negative.     Medication Review:  · Pressors - N/A  · Sedation - N/A  · Antibiotics - received Zosyn and vancomycn in ED but currently not on empiric abx  · Pain - PRN [Percocet 5/325mg; Dilaudid .5mg]  · GI/ DVT - Pepcid 20mg PO/ Heparin Drip  · Others (other gtts)      Vital Signs:    Visit Vitals    /55    Pulse 84    Temp 97.8 °F (36.6 °C)    Resp 20    Ht 5' 3\" (1.6 m)    Wt 86.9 kg (191 lb 9.3 oz)    SpO2 99%    Breastfeeding No    BMI 33.94 kg/m2       O2 Device: Nasal cannula   O2 Flow Rate (L/min): 2 l/min   Temp (24hrs), Av.3 °F (36.8 °C), Min:97.8 °F (36.6 °C), Max:99.1 °F (37.3 °C)       Intake/Output:   Last shift:       07 -  190  In: 3559.6 [P.O.:360; I.V.:3199.6]  Out: -   Last 3 shifts:  190 -  0700  In: 120 [P.O.:120]  Out: -     Intake/Output Summary (Last 24 hours) at 17 1224  Last data filed at 17 1100   Gross per 24 hour   Intake          3559.61 ml   Output                0 ml   Net          3559.61 ml       Physical Exam:    General: Awake, alert, cooperative; no acute distress. HEENT:  Anicteric sclerae; pink palpebral conjunctivae; mucosa moist  Resp:  Symmetrical chest expansion, no accessory muscle use; Diminished slightly in the bases; no rales/ wheezing/ rhonchi noted  CV:  S1, S2 present; RRR  GI:  Abdomen soft, direct TTP in RLQ however negative rebound; (+) active bowel sounds x4. Extremities: +2 pulses on all extremities; edema +2 in BLE up to mid shin; edema +2  BUE up to the elbow.  No cyanosis/ clubbing noted  Skin:  Warm; no rashes/ lesions noted  Neurologic:  Non-focal  Devices:  No NGT/OGT, Central line/ PICC, ETT/tracheostomy, chest tube      DATA:     Current Facility-Administered Medications   Medication Dose Route Frequency    lactated ringers infusion  75 mL/hr IntraVENous CONTINUOUS    sodium chloride (NS) flush 5-10 mL  5-10 mL IntraVENous Q8H    sodium chloride (NS) flush 5-10 mL  5-10 mL IntraVENous PRN    famotidine (PEPCID) tablet 20 mg  20 mg Oral ON CALL TO OR    celecoxib (CELEBREX) capsule 400 mg  400 mg Oral ON CALL TO OR    pregabalin (LYRICA) capsule 50 mg  50 mg Oral ON CALL TO OR    heparin 25,000 units in D5W 250 ml infusion  18-36 Units/kg/hr IntraVENous TITRATE    0.9% sodium chloride infusion  75 mL/hr IntraVENous CONTINUOUS    albuterol-ipratropium (DUO-NEB) 2.5 MG-0.5 MG/3 ML  3 mL Nebulization Q4H RT    LORazepam (ATIVAN) injection 1 mg  1 mg IntraVENous Q6H PRN    0.9% sodium chloride infusion 250 mL  250 mL IntraVENous PRN    dexamethasone (DECADRON) 4 mg/mL injection 4 mg  4 mg IntraVENous Q6H    0.9% sodium chloride infusion 250 mL  250 mL IntraVENous PRN    calcium acetate-aluminum sulf (DOMEBORO) 5 Packet, sodium chloride irrigation 0.9 % 500 mL   Topical BID    ondansetron (ZOFRAN) injection 4 mg  4 mg IntraVENous Q4H PRN    influenza vaccine 2016-17 (36mos+)(PF) (FLUZONE/FLUARIX/FLULAVAL QUAD) injection 0.5 mL  0.5 mL IntraMUSCular PRIOR TO DISCHARGE    HYDROmorphone (DILAUDID) syringe 0.5 mg  0.5 mg IntraVENous Q4H PRN    oxyCODONE-acetaminophen (PERCOCET) 5-325 mg per tablet 1 Tab  1 Tab Oral Q4H PRN    acetaminophen (TYLENOL) tablet 500 mg  500 mg Oral Q6H PRN    famotidine (PEPCID) tablet 20 mg  20 mg Oral BID    docusate sodium (COLACE) capsule 100 mg  100 mg Oral BID    multivitamin, tx-iron-ca-min (THERA-M w/ IRON) tablet 1 Tab  1 Tab Oral DAILY         Labs: Results:       Chemistry Recent Labs      01/23/17   0042  01/22/17   0256  01/21/17   0429   GLU  114*  142*  139*   NA  137  138  138   K  4.3  4.1  4.0   CL  100  101  104   CO2  32  30  27   BUN  20*  21*  16   CREA  0.72  0.80  0.66   CA  9.0  9.4  9.0   AGAP  5  7  7   BUCR  28*  26*  24*      CBC w/Diff Recent Labs      01/23/17   0042  01/22/17   1945  01/22/17   0256  01/21/17   0429   WBC  11.8   --   12.7  14.0*   RBC  3.28*   --   3.31*  3.24*   HGB  8.8*  10.2*  8.9*  8.7*   HCT  26.9*  31.1*  27.2*  26.4*   PLT  410   --   404  392   GRANS  80*   --   82*  89*   LYMPH  9*   --   6* 6*   EOS  0   --   0  0      Coagulation Recent Labs      01/23/17   0558  01/23/17   0042  01/22/17   1745   PTP   --   15.2  14.0   INR   --   1.2  1.1   APTT  150.4*  95.2*  26.4       Liver Enzymes No results for input(s): TP, ALB, TBIL, AP, SGOT, GPT in the last 72 hours. No lab exists for component: DBIL   ABG Lab Results   Component Value Date/Time    PHI 7.478 (H) 01/22/2017 07:11 PM    PCO2I 46.0 (H) 01/22/2017 07:11 PM    PO2I 163 (H) 01/22/2017 07:11 PM    HCO3I 34.1 (H) 01/22/2017 07:11 PM    FIO2I 45 01/22/2017 07:11 PM      Microbiology No results for input(s): CULT in the last 72 hours. Telemetry: [x]Sinus []A-flutter []Paced    []A-fib []Multiple PVCs                    Imaging: no new imaging today      CTA CHEST (01/22/17 @ 1700): FINDINGS:  LUNGS: Innumerable pulmonary nodules bilaterally as before. Respiratory motion that diminishes the sensitivity of this test for detection of abnormalities. Largest nodules left lower lobe adjacent to pleura as before and measures 10 mm (42). Subsegmental atelectasis inferiorly right lung base. Pleural space: New small pleural effusions layering posteriorly. CHEST WALL: Very large left subareolar breast mass measures at least 11 cm. Lymph nodes: Extensive left axillary adenopathy with largest lymph node measuring 6 mm. Numerous smaller middle mediastinal lymph nodes. Heart and pericardium: Unremarkable. Aorta and arch vessels: Mild burden of calcified atheromatous plaque. Pulmonary arteries: Multiple subsegmental filling defects R descending pulmonary artery (133), L descending pulmonary artery (145)  Upper abdomen: Not as well evaluated on early phase imaging. No acute change when compared previous study. Diverticulosis coli. Left adrenal nodule. Bony skeleton: Osseous lucent lesions medially left clavicle, throughout the manubrium and sternum, multiple vertebral segments including pedicle T4, bodyT8. MIP images: Valene Monroe are motion degraded. Subsegmental filling defects again  identified both lower lobes. Numerous pulmonary nodules as above.]     IMPRESSION:  Small burden of pulmonary embolism in multiple subsegmental vessels both lower  Lobes. New small pleural effusions. Pulmonary, osseous, vinay and left adrenal metastases. Large left breast mass. Subsegmental atelectasis at the lung bases. IMPRESSION:   · Acute Respiratory failure requiring noninvasive ventilation support - improving  · Breast CA w/ METS to multiple organs, including Lung, Bone, Spine, L Adrenal gland. 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 · Resp -  O2 sat goal >92%. Continue to titrate supp. O2 PRN. Monitor for respiratory distress. Continue Duo-nebs PRN. Consider repeat ABG if oxygenation demand increases. · ID - ABX (Zosyn) was started on 01/18/17 for presumed sepsis. BxCx show no growth x5 days. D/C ABX today. No further tx needed at this time. · CVS - Duplex LE & UE for possible source for pitting edema. Echo scheduled for today. Repeat trop today; Consider repeating EKG pending result of trop if still trending upward. · Heme/onc -  Hem/Onc on board. Daily CBC. Monitor coags & H&H for bleeding and coagulopathies. Continue Heparin drip for bilat PE's. IV decadron started to aid with inflammatory process from lytic lesion/ mets to bone  · Metabolic - Daily BMP. Replace lyts per protocol. IVF NS @ 75mL/hr. · Renal - Monitor Cr. No further issues at this time. · Endocrine - BS Goal <180. Consider adding Insulin PRN if pt glucose checks are showing hyperglycemia, as pt is on IV steroid.    · Neuro/ Pain/ Sedation - Continue PRN Pain meds. Spine surgery was cancelled today d/t PE.   · GI - Continue Pepcid PO. Pt has not had a BM since transfer to ICU. Continue Colace PRN, consider changing to scheduled until pt has BM.    · Prophylaxis - DVT - Heparin drip/ GI - Pepid 20mg PO  · Discussed in interdisciplinary rounds          The patient is: [x] acutely ill Risk of deterioration: [x] moderate    [] critically ill  [] high     []See my orders for details    My assessment/plan was discussed with:  [x]nursing []PT/OT    []respiratory therapy [x]Dr. Shyam Garcia   []family []     Xochilt Ruvalcaba PA-C

## 2017-01-23 NOTE — INTERDISCIPLINARY ROUNDS
CRITICAL CARE INTERDISCIPLINARY ROUNDS      Patient Information:    Name:   Paula Rajput    Age:   68 y.o.     Admission Date:   1/17/2017    Readmit Risk Assessment Information:      Readmit Risk Tool Support Systems: Family member(s), [employer] / nicolette community, Relationship with Primary Physician Group: Not seen within the past 12 months    Surgery Date:      Day of Stay:     Expected Discharge Date:        Attending Provider:   Imer Loja MD    Surgeon:        Consultant:       Primary Care Provider:   Kenya Mederos MD    Problem List:     Patient Active Problem List   Diagnosis Code    Neck mass R22.1    Malignant neoplasm of upper-outer quadrant of left female breast ([address] C50.412    Bone metastasis ([address] C79.51    Spinal cord compression due to malignant neoplasm metastatic to spine ([address] G95.20, C79.51    Malignant neoplasm metastatic to right lung ([address] C78.01    Weakness of both arms M62.81       Principal Problem:  <principal problem not specified>    Procedure:       During rounds the following quality care indicators and evidence based practices were addressed :     DVT Prophylaxis, Pressure Ulcer Prevention, Pain Management, Sepsis resuscitation and management, Nutritional Status, Critical Care Interventions Airways, Drains and Lines and IHI Bundles: Central Line Bundle Followed  and Negro Bundle Followed           Acute MI/PCI:   Not applicable    Heart Failure:    Not applicable    Cardiac Surgery:  Not applicable    SCIP Measures for other Surgeries:   Not applicable    Pneumonia:    Appropriate Antibiotic Selection (ICU versus Non-ICU)    Stroke:  Patient's Personal Risk Factors for Stroke are:   hypertension, family history, hyperlipidemia or diabetes mellitus    NIH Stroke Score       Transfer Level of Care:  Progressing to Transfer    The patient will require the following interventions based on the Readmission Risk Assessment:  Pharmacy evaluation and teaching, Care Management involvement for home health follow up for:  mobility and assistance with ADL's and Spiritual Care evaluation      Discharge Management:  Home and Palliative Care    Anticipated Discharge Date:  3days      Interdisciplinary team rounds were held  with the following team membersCare Management, Diabetes Treatment Specialist, Nursing, Nutrition, Palliative Care, Pastoral Care, Pharmacy, Physician and Clinical Coordinator and the  patient. Plan of care discussed. See clinical pathway and/or care plan for interventions and desired outcomes. Palliative meeting at 1pm today. PVL studies of upper and lower extremities.

## 2017-01-23 NOTE — ROUTINE PROCESS
Family meeting occurred at bedside with Oncology MD, Palliative care and TEODORA. Plan of care was discussed and prognosis.

## 2017-01-23 NOTE — PROGRESS NOTES
SUBJECTIVE:    Patient denies for chest or abdominal pain. No nausea or vomiting. She still thinks that I advised nursing to provide her inappropriate care on [address]. She does not want to talk to me about it. OBJECTIVE:    Visit Vitals    /55    Pulse 84    Temp 97.8 °F (36.6 °C)    Resp 20    Ht 5' 3\" (1.6 m)    Wt 86.9 kg (191 lb 9.3 oz)    SpO2 99%    Breastfeeding No    BMI 33.94 kg/m2     CVS: RRR  RS:CTA bilaterally  GI: ND, NT, BS +  Extremities: No pedal edema  General: Awake,NAD, follows commands       ASSESSMENT:    1. Osteolytic lesion C2 to C4 with pathologic fracture C3 with moderate cord compression C2-C4. 2. Metastatic left Breast mass including bone, lungs, adrenal.  3. Acute recurrent PE  4. Leg pain, no DVT - BETTER  5. Elevated AST, improving. 6. Low grade fever, resolved  7. Moderate protein malnutrition   8. Leukocytosis sec to steroid, better  9. Acute respiratory distress due to PE    PLAN:    Cont antibiotic for a total of 7 days. CT head and CT chest report reviewed  Pending biopsy report  Spine surgery, general surgery, and oncology to follow  Have asked dr. Franchesca Last to follow her from tomorrow.    Palliative care meeting later today    --> total time greater than 30 minutes    CMP:   Lab Results   Component Value Date/Time     01/23/2017 12:42 AM    K 4.3 01/23/2017 12:42 AM     01/23/2017 12:42 AM    CO2 32 01/23/2017 12:42 AM    AGAP 5 01/23/2017 12:42 AM     (H) 01/23/2017 12:42 AM    BUN 20 (H) 01/23/2017 12:42 AM    CREA 0.72 01/23/2017 12:42 AM    GFRAA >60 01/23/2017 12:42 AM    GFRNA >60 01/23/2017 12:42 AM    CA 9.0 01/23/2017 12:42 AM    MG 2.2 01/23/2017 12:42 AM     CBC:   Lab Results   Component Value Date/Time    WBC 11.8 01/23/2017 12:42 AM    HGB 8.8 (L) 01/23/2017 12:42 AM    HCT 26.9 (L) 01/23/2017 12:42 AM     01/23/2017 12:42 AM

## 2017-01-23 NOTE — PROGRESS NOTES
Palliative Medicine  [place]: [phone])  [facility]: [phone])      Resuscitation Status: DNR   Durable DNR addressed? [x] Yes   [] No    [] Not Applicable    Advance Care Planning 1/18/2017   Patient's Healthcare Decision Maker is: Legal Next of Gilma 69   Primary Decision Maker Name Geovany Martinez (cousin)   Primary Decision Maker Phone Number [phone] or [phone]   Primary Decision Maker Relationship to Patient Other relative   Secondary Decision Maker Name Edward Mix (cousin)   Secondary Decision Maker Phone Number [phone] or [phone]   Secondary Decision Maker Relationship to Patient Other relative   Confirm Advance Directive None   Patient Would Like to Complete Advance Directive Yes   Does the patient have other document types Do Not Resuscitate     Family meeting with patient, family members (cousins), oncologist, ICU PA's and Palliative team. Lengthy discussion about transitioning to comfort measures. Patient was in agreement with comfort measures to include DNR/DNI. She and family will be discussing discharge plan as to where her care will be with hospice/comfort. CM will assist with d/c options. Patient shared emotional/spiritual distress with processing the new information. She was tearful with sharing her joys, hopes, and worries. Sensed come anticipatory grief, patient trying to find closure and acceptance of her mortality. Her personal relationship with God and cousins have provided emotional comfort. She requested pastoral supportive visits; notified Palliative . Palliative team will continue to provided emotional/spiritual support. Thank you for the opportunity to assist in the care of Ms. Fern Claude.   Sendy Kwon, JD [facility]  Palliative Medicine

## 2017-01-23 NOTE — PROGRESS NOTES
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 She just had another episode of PE yesterday. At this point, comfort measures are most appropriate next steps of management. Patient and family agreed with our recommendations, she will be DNR-DNI, hospice-comfort care, discontinue all IV meds and blood draws           Thank you for the opportunity to participate in the care, please do not hesitate to call for any questions or concerns. I have discussed the diagnosis with the patient and the intended plan. The patient verbalized understanding and agrees with the plan.       History:   Darwin Mackey is a 68 y.o. female presenting today for Headache  She is found to have spinal cord compression with metastatic breast cancer    Current Facility-Administered Medications   Medication Dose Route Frequency Provider Last Rate Last Dose    glucose chewable tablet 16 g  4 Tab Oral PRN Toy Ramirez PA-C        glucagon (GLUCAGEN) injection 1 mg  1 mg IntraMUSCular PRN Toy Ramirez PA-C        dextrose (D50W) injection syrg 12.5-25 g  25-50 mL IntraVENous PRN Toy Ramirez PA-C        lactated ringers infusion  75 mL/hr IntraVENous CONTINUOUS John Lopez MD   Stopped at 01/23/17 0700    sodium chloride (NS) flush 5-10 mL  5-10 mL IntraVENous Fernandez Montoya MD   10 mL at 01/23/17 0649    sodium chloride (NS) flush 5-10 mL  5-10 mL IntraVENous PRN John Lopez MD        famotidine (PEPCID) tablet 20 mg  20 mg Oral ON CALL TO OR Sharyle Jock, MD   Stopped at 01/23/17 0700    celecoxib (CELEBREX) capsule 400 mg  400 mg Oral ON CALL TO OR Sharyle Jock, MD   Stopped at 01/23/17 0700    pregabalin (LYRICA) capsule 50 mg  50 mg Oral ON CALL TO OR Sharyle Jock, MD   Stopped at 01/23/17 0700    heparin 25,000 units in D5W 250 ml infusion  18-36 Units/kg/hr IntraVENous TITRATE Jim Copeland MD 13.6 mL/hr at 01/23/17 0855 15 Units/kg/hr at 01/23/17 0855    0.9% sodium chloride infusion  75 mL/hr IntraVENous CONTINUOUS Jim Copeland MD 75 mL/hr at 01/23/17 1006 75 mL/hr at 01/23/17 1006    albuterol-ipratropium (DUO-NEB) 2.5 MG-0.5 MG/3 ML  3 mL Nebulization Q4H RT Jim Copeland MD   3 mL at 01/23/17 1229    LORazepam (ATIVAN) injection 1 mg  1 mg IntraVENous Q6H PRN Jim Copeland MD        0.9% sodium chloride infusion 250 mL  250 mL IntraVENous PRN Chilango Valdivia PA-C        dexamethasone (DECADRON) 4 mg/mL injection 4 mg  4 mg IntraVENous Q6H Durga Finnegan MD   4 mg at 01/23/17 5070    0.9% sodium chloride infusion 250 mL  250 mL IntraVENous PRN Jim Copeland MD        calcium acetate-aluminum sulf (DOMEBORO) 5 Packet, sodium chloride irrigation 0.9 % 500 mL   Topical BID Sakina Kaur MD        ondansetron [facility]) injection 4 mg  4 mg IntraVENous Q4H PRN Sakina Kaur MD        influenza vaccine 3512-91 (36mos+)(PF) (FLUZONE/FLUARIX/FLULAVAL QUAD) injection 0.5 mL  0.5 mL IntraMUSCular PRIOR TO DISCHARGE Sakina Kaur MD        HYDROmorphone (DILAUDID) syringe 0.5 mg  0.5 mg IntraVENous Q4H PRN Jim Copeland MD        oxyCODONE-acetaminophen (PERCOCET) 5-325 mg per tablet 1 Tab  1 Tab Oral Q4H PRN Jim Copeland MD   1 Tab at 01/23/17 0956    acetaminophen (TYLENOL) tablet 500 mg  500 mg Oral Q6H PRN Jim Copeland MD        famotidine (PEPCID) tablet 20 mg  20 mg Oral BID Jim Copeland MD   20 mg at 01/23/17 0957    docusate sodium (COLACE) capsule 100 mg  100 mg Oral BID Lj Haskins MD   100 mg at 01/23/17 0956    multivitamin, tx-iron-ca-min (THERA-M w/ IRON) tablet 1 Tab  1 Tab Oral DAILY Lj Haskins MD   1 Tab at 01/23/17 0956       Objective:   VS:    Visit Vitals    /55    Pulse 84    Temp 97.8 °F (36.6 °C)    Resp 20    Ht 5' 3\" (1.6 m)    Wt 86.9 kg (191 lb 9.3 oz)    SpO2 99%    Breastfeeding No    BMI 33.94 kg/m2        Physical Exam:     Constitutional:  Stated age, no acute distress and alert and oriented x 3    HENT:  Oral mucosa pink and moist, no cyanosis observed and no pallor observed.    Eyes:  conjunctiva normal, upper and lower eyelid normal bilaterally.    Neck:  No jugular venous distension present.    Cardiovascular:  heart sounds normal, normal rate and regular rhythm, no murmurs or rubs, no thrills, no S3 or S4 palpable,and no palpable opening snaps. Point of maximal impulse normal. Carotid arteries normal. BP in 2+ extremities not indicated.    Pulmonary/Chest Wall:  breath sounds are coarse bilaterally and no use of accessory muscles in breathing.                         Left breast mass and axillary nodes are almost fixed to chest wall           Recent Results (from the past 168 hour(s))   CBC WITH AUTOMATED DIFF    Collection Time: 01/17/17  9:46 PM   Result Value Ref Range    WBC 12.8 4.6 - 13.2 K/uL    RBC 4.01 (L) 4.20 - 5.30 M/uL    HGB 11.1 (L) 12.0 - 16.0 g/dL    HCT 32.9 (L) 35.0 - 45.0 %    MCV 82.0 74.0 - 97.0 FL    MCH 27.7 24.0 - 34.0 PG    MCHC 33.7 31.0 - 37.0 g/dL    RDW 13.4 11.6 - 14.5 %    PLATELET 055 (H) 110 - 420 K/uL    MPV 8.9 (L) 9.2 - 11.8 FL    NEUTROPHILS 71 42 - 75 %    LYMPHOCYTES 14 (L) 20 - 51 %    MONOCYTES 15 (H) 2 - 9 %    EOSINOPHILS 0 0 - 5 %    BASOPHILS 0 0 - 3 %    ABS. NEUTROPHILS 9.1 (H) 1.8 - 8.0 K/UL    ABS. LYMPHOCYTES 1.8 0.8 - 3.5 K/UL    ABS. MONOCYTES 1.9 (H) 0 - 1.0 K/UL    ABS. EOSINOPHILS 0.0 0.0 - 0.4 K/UL    ABS.  BASOPHILS 0.0 0.0 - 0.06 K/UL    DF MANUAL      PLATELET COMMENTS INCREASED PLATELETS      RBC COMMENTS HYPOCHROMIA  1+       METABOLIC PANEL, COMPREHENSIVE    Collection Time: 01/17/17  9:46 PM   Result Value Ref Range    Sodium 133 (L) 136 - 145 mmol/L    Potassium 3.6 3.5 - 5.5 mmol/L    Chloride 97 (L) 100 - 108 mmol/L    CO2 30 21 - 32 mmol/L    Anion gap 6 3.0 - 18 mmol/L    Glucose 108 (H) 74 - 99 mg/dL    BUN 26 (H) 7.0 - 18 MG/DL    Creatinine 0.95 0.6 - 1.3 MG/DL    BUN/Creatinine ratio 27 (H) 12 - 20      GFR est AA >60 >60 ml/min/1.73m2    GFR est non-AA 57 (L) >60 ml/min/1.73m2    Calcium 10.4 (H) 8.5 - 10.1 MG/DL    Bilirubin, total 0.6 0.2 - 1.0 MG/DL    ALT 38 13 - 56 U/L     (H) 15 - 37 U/L    Alk.  phosphatase 66 45 - 117 U/L    Protein, total 8.7 (H) 6.4 - 8.2 g/dL    Albumin 2.9 (L) 3.4 - 5.0 g/dL    Globulin 5.8 (H) 2.0 - 4.0 g/dL    A-G Ratio 0.5 (L) 0.8 - 1.7     MAGNESIUM    Collection Time: 01/17/17  9:46 PM   Result Value Ref Range    Magnesium 2.1 1.8 - 2.4 mg/dL   PROTHROMBIN TIME + INR    Collection Time: 01/17/17  9:46 PM   Result Value Ref Range    Prothrombin time 21.2 (H) 11.5 - 15.2 sec    INR 1.9 (H) 0.8 - 1.2     PTT    Collection Time: 01/17/17  9:46 PM   Result Value Ref Range    aPTT 35.9 23.0 - 36.4 SEC   CARDIAC PANEL,(CK, CKMB & TROPONIN)    Collection Time: 01/17/17  9:46 PM   Result Value Ref Range     26 - 192 U/L    CK - MB 2.9 0.5 - 3.6 ng/ml    CK-MB Index 2.0 0.0 - 4.0 %    Troponin-I, Qt. 0.02 0.0 - 0.045 NG/ML   EKG, 12 LEAD, INITIAL    Collection Time: 01/17/17  9:50 PM   Result Value Ref Range    Ventricular Rate 103 BPM    Atrial Rate 103 BPM    P-R Interval 158 ms    QRS Duration 126 ms    Q-T Interval 348 ms    QTC Calculation (Bezet) 455 ms    Calculated P Axis 28 degrees    Calculated R Axis -41 degrees    Calculated T Axis 118 degrees    Diagnosis       Sinus tachycardia  Left axis deviation  Left bundle branch block  Abnormal ECG  When compared with ECG of 30-JUN-2012 07:01,  ST elevation has replaced ST depression in Anterior leads  T wave inversion now evident in Lateral leads  Confirmed by Sunny Morales ([id number]) on 1/18/2017 7:58:45 PM     URINALYSIS W/ RFLX MICROSCOPIC    Collection Time: 01/17/17 11:55 PM   Result Value Ref Range    Color YELLOW      Appearance CLEAR      Specific gravity >1.030 (H) 1.005 - 1.030    pH (UA) 5.5 5.0 - 8.0      Protein NEGATIVE  NEG mg/dL    Glucose NEGATIVE  NEG mg/dL    Ketone NEGATIVE  NEG mg/dL    Bilirubin NEGATIVE  NEG      Blood NEGATIVE  NEG      Urobilinogen 1.0 0.2 - 1.0 EU/dL    Nitrites NEGATIVE  NEG      Leukocyte Esterase TRACE (A) NEG     URINE MICROSCOPIC ONLY    Collection Time: 01/17/17 11:55 PM   Result Value Ref Range    WBC 3 to 5 0 - 4 /hpf    RBC NEGATIVE  0 - 5 /hpf    Epithelial cells FEW 0 - 5 /lpf    Bacteria 1+ (A) NEG /hpf    Uric Acid Crystals 2+ (A) NEG   MRSA SCREEN - PCR (NASAL)    Collection Time: 01/18/17  7:00 AM   Result Value Ref Range    Special Requests: NO SPECIAL REQUESTS      Culture result:        MRSA target DNA is not detected (presumptive not colonized with MRSA)   METABOLIC PANEL, BASIC    Collection Time: 01/18/17  9:12 AM   Result Value Ref Range    Sodium 134 (L) 136 - 145 mmol/L    Potassium 3.7 3.5 - 5.5 mmol/L    Chloride 99 (L) 100 - 108 mmol/L    CO2 28 21 - 32 mmol/L    Anion gap 7 3.0 - 18 mmol/L    Glucose 87 74 - 99 mg/dL    BUN 21 (H) 7.0 - 18 MG/DL    Creatinine 0.68 0.6 - 1.3 MG/DL    BUN/Creatinine ratio 31 (H) 12 - 20      GFR est AA >60 >60 ml/min/1.73m2    GFR est non-AA >60 >60 ml/min/1.73m2    Calcium 9.5 8.5 - 10.1 MG/DL   MAGNESIUM    Collection Time: 01/18/17  9:12 AM   Result Value Ref Range    Magnesium 1.9 1.8 - 2.4 mg/dL   PROTHROMBIN TIME + INR    Collection Time: 01/18/17  6:00 PM   Result Value Ref Range    Prothrombin time 25.2 (H) 11.5 - 15.2 sec    INR 2.4 (H) 0.8 - 1.2     CULTURE, BLOOD    Collection Time: 01/18/17  7:13 PM   Result Value Ref Range    Special Requests: NO SPECIAL REQUESTS Culture result: NO GROWTH 5 DAYS     CULTURE, BLOOD    Collection Time: 01/18/17  7:19 PM   Result Value Ref Range    Special Requests: NO SPECIAL REQUESTS      Culture result: NO GROWTH 5 DAYS     CBC WITH AUTOMATED DIFF    Collection Time: 01/19/17  3:53 AM   Result Value Ref Range    WBC 11.8 4.6 - 13.2 K/uL    RBC 3.29 (L) 4.20 - 5.30 M/uL    HGB 8.9 (L) 12.0 - 16.0 g/dL    HCT 27.3 (L) 35.0 - 45.0 %    MCV 83.0 74.0 - 97.0 FL    MCH 27.1 24.0 - 34.0 PG    MCHC 32.6 31.0 - 37.0 g/dL    RDW 13.6 11.6 - 14.5 %    PLATELET 508 590 - 662 K/uL    MPV 8.8 (L) 9.2 - 11.8 FL    NEUTROPHILS 74 (H) 40 - 73 %    LYMPHOCYTES 14 (L) 21 - 52 %    MONOCYTES 12 (H) 3 - 10 %    EOSINOPHILS 0 0 - 5 %    BASOPHILS 0 0 - 2 %    ABS. NEUTROPHILS 8.7 (H) 1.8 - 8.0 K/UL    ABS. LYMPHOCYTES 1.6 0.9 - 3.6 K/UL    ABS. MONOCYTES 1.5 (H) 0.05 - 1.2 K/UL    ABS. EOSINOPHILS 0.0 0.0 - 0.4 K/UL    ABS. BASOPHILS 0.0 0.0 - 0.1 K/UL    DF AUTOMATED     METABOLIC PANEL, COMPREHENSIVE    Collection Time: 01/19/17  3:53 AM   Result Value Ref Range    Sodium 134 (L) 136 - 145 mmol/L    Potassium 3.6 3.5 - 5.5 mmol/L    Chloride 101 100 - 108 mmol/L    CO2 27 21 - 32 mmol/L    Anion gap 6 3.0 - 18 mmol/L    Glucose 119 (H) 74 - 99 mg/dL    BUN 16 7.0 - 18 MG/DL    Creatinine 0.79 0.6 - 1.3 MG/DL    BUN/Creatinine ratio 20 12 - 20      GFR est AA >60 >60 ml/min/1.73m2    GFR est non-AA >60 >60 ml/min/1.73m2    Calcium 8.9 8.5 - 10.1 MG/DL    Bilirubin, total 0.5 0.2 - 1.0 MG/DL    ALT 22 13 - 56 U/L    AST 58 (H) 15 - 37 U/L    Alk.  phosphatase 47 45 - 117 U/L    Protein, total 6.3 (L) 6.4 - 8.2 g/dL    Albumin 1.9 (L) 3.4 - 5.0 g/dL    Globulin 4.4 (H) 2.0 - 4.0 g/dL    A-G Ratio 0.4 (L) 0.8 - 1.7     MAGNESIUM    Collection Time: 01/19/17  3:53 AM   Result Value Ref Range    Magnesium 1.9 1.8 - 2.4 mg/dL   PROTHROMBIN TIME + INR    Collection Time: 01/19/17  3:53 AM   Result Value Ref Range    Prothrombin time 24.1 (H) 11.5 - 15.2 sec    INR 2.3 (H) 0.8 - 1.2     CEA    Collection Time: 01/19/17 12:10 PM   Result Value Ref Range    CEA 19.5 ng/mL   CA 27.29    Collection Time: 01/19/17 12:10 PM   Result Value Ref Range    CA 27.29 49.4 (H) 0.0 - 38.6 U/mL   FFP, ALLOCATE    Collection Time: 01/19/17  1:00 PM   Result Value Ref Range    CALLED TO:       MARYCHUY BOLAÑOS, [address] [place] [address], AT 16:13 ON 1/19/2017 BY [facility]    Unit number [id number]     Blood component type FP24H,THAW     Unit division 00     Status of unit TRANSFUSED     Unit number [id number]     Blood component type FP24H,THAW     Unit division 00     Status of unit TRANSFUSED    TYPE & SCREEN    Collection Time: 01/19/17  1:30 PM   Result Value Ref Range    Crossmatch Expiration 01/22/2017     ABO/Rh(D) Ginger Bennett NEGATIVE     Antibody screen NEG    VANCOMYCIN, TROUGH    Collection Time: 01/20/17  5:03 AM   Result Value Ref Range    Vancomycin,trough 2.5 (L) 10.0 - 20.0 ug/mL    Reported dose date: 20170119      Reported dose time: 1100      Reported dose: 1000 MG UNITS   METABOLIC PANEL, BASIC    Collection Time: 01/20/17  5:03 AM   Result Value Ref Range    Sodium 136 136 - 145 mmol/L    Potassium 3.8 3.5 - 5.5 mmol/L    Chloride 101 100 - 108 mmol/L    CO2 28 21 - 32 mmol/L    Anion gap 7 3.0 - 18 mmol/L    Glucose 116 (H) 74 - 99 mg/dL    BUN 13 7.0 - 18 MG/DL    Creatinine 0.67 0.6 - 1.3 MG/DL    BUN/Creatinine ratio 19 12 - 20      GFR est AA >60 >60 ml/min/1.73m2    GFR est non-AA >60 >60 ml/min/1.73m2    Calcium 9.3 8.5 - 10.1 MG/DL   CBC WITH AUTOMATED DIFF    Collection Time: 01/20/17  5:03 AM   Result Value Ref Range    WBC 12.3 4.6 - 13.2 K/uL    RBC 3.36 (L) 4.20 - 5.30 M/uL    HGB 9.0 (L) 12.0 - 16.0 g/dL    HCT 27.5 (L) 35.0 - 45.0 %    MCV 81.8 74.0 - 97.0 FL    MCH 26.8 24.0 - 34.0 PG    MCHC 32.7 31.0 - 37.0 g/dL    RDW 13.5 11.6 - 14.5 %    PLATELET 216 647 - 258 K/uL    MPV 9.0 (L) 9.2 - 11.8 FL    NEUTROPHILS 86 (H) 40 - 73 %    LYMPHOCYTES 8 (L) 21 - 52 %    MONOCYTES 6 3 - 10 % EOSINOPHILS 0 0 - 5 %    BASOPHILS 0 0 - 2 %    ABS. NEUTROPHILS 10.6 (H) 1.8 - 8.0 K/UL    ABS. LYMPHOCYTES 0.9 0.9 - 3.6 K/UL    ABS. MONOCYTES 0.8 0.05 - 1.2 K/UL    ABS. EOSINOPHILS 0.0 0.0 - 0.4 K/UL    ABS. BASOPHILS 0.0 0.0 - 0.1 K/UL    DF AUTOMATED     MAGNESIUM    Collection Time: 01/20/17  5:03 AM   Result Value Ref Range    Magnesium 2.0 1.8 - 2.4 mg/dL   PROTHROMBIN TIME + INR    Collection Time: 01/20/17  5:03 AM   Result Value Ref Range    Prothrombin time 16.6 (H) 11.5 - 15.2 sec    INR 1.4 (H) 0.8 - 1.2     METABOLIC PANEL, BASIC    Collection Time: 01/21/17  4:29 AM   Result Value Ref Range    Sodium 138 136 - 145 mmol/L    Potassium 4.0 3.5 - 5.5 mmol/L    Chloride 104 100 - 108 mmol/L    CO2 27 21 - 32 mmol/L    Anion gap 7 3.0 - 18 mmol/L    Glucose 139 (H) 74 - 99 mg/dL    BUN 16 7.0 - 18 MG/DL    Creatinine 0.66 0.6 - 1.3 MG/DL    BUN/Creatinine ratio 24 (H) 12 - 20      GFR est AA >60 >60 ml/min/1.73m2    GFR est non-AA >60 >60 ml/min/1.73m2    Calcium 9.0 8.5 - 10.1 MG/DL   CBC WITH AUTOMATED DIFF    Collection Time: 01/21/17  4:29 AM   Result Value Ref Range    WBC 14.0 (H) 4.6 - 13.2 K/uL    RBC 3.24 (L) 4.20 - 5.30 M/uL    HGB 8.7 (L) 12.0 - 16.0 g/dL    HCT 26.4 (L) 35.0 - 45.0 %    MCV 81.5 74.0 - 97.0 FL    MCH 26.9 24.0 - 34.0 PG    MCHC 33.0 31.0 - 37.0 g/dL    RDW 13.4 11.6 - 14.5 %    PLATELET 433 042 - 926 K/uL    MPV 9.0 (L) 9.2 - 11.8 FL    NEUTROPHILS 89 (H) 40 - 73 %    LYMPHOCYTES 6 (L) 21 - 52 %    MONOCYTES 5 3 - 10 %    EOSINOPHILS 0 0 - 5 %    BASOPHILS 0 0 - 2 %    ABS. NEUTROPHILS 12.6 (H) 1.8 - 8.0 K/UL    ABS. LYMPHOCYTES 0.8 (L) 0.9 - 3.6 K/UL    ABS. MONOCYTES 0.6 0.05 - 1.2 K/UL    ABS. EOSINOPHILS 0.0 0.0 - 0.4 K/UL    ABS.  BASOPHILS 0.0 0.0 - 0.1 K/UL    DF AUTOMATED     PROTHROMBIN TIME + INR    Collection Time: 01/21/17  4:29 AM   Result Value Ref Range    Prothrombin time 15.2 11.5 - 15.2 sec    INR 1.2 0.8 - 1.2     TYPE & CROSSMATCH    Collection Time: 01/21/17 12:25 PM   Result Value Ref Range    Crossmatch Expiration 01/24/2017     ABO/Rh(D) O NEGATIVE     Antibody screen NEG     Unit number [id number]     Blood component type  LR AS1     Unit division 00     Status of unit ALLOCATED     Crossmatch result Compatible     Unit number [id number]     Blood component type RC LR AS1     Unit division 00     Status of unit ALLOCATED     Crossmatch result Compatible    VANCOMYCIN, TROUGH    Collection Time: 01/21/17  9:55 PM   Result Value Ref Range    Vancomycin,trough 4.7 (L) 10.0 - 20.0 ug/mL    Reported dose date: 20170121      Reported dose time: 1000      Reported dose: 1000 MG UNITS   METABOLIC PANEL, BASIC    Collection Time: 01/22/17  2:56 AM   Result Value Ref Range    Sodium 138 136 - 145 mmol/L    Potassium 4.1 3.5 - 5.5 mmol/L    Chloride 101 100 - 108 mmol/L    CO2 30 21 - 32 mmol/L    Anion gap 7 3.0 - 18 mmol/L    Glucose 142 (H) 74 - 99 mg/dL    BUN 21 (H) 7.0 - 18 MG/DL    Creatinine 0.80 0.6 - 1.3 MG/DL    BUN/Creatinine ratio 26 (H) 12 - 20      GFR est AA >60 >60 ml/min/1.73m2    GFR est non-AA >60 >60 ml/min/1.73m2    Calcium 9.4 8.5 - 10.1 MG/DL   PROTHROMBIN TIME + INR    Collection Time: 01/22/17  2:56 AM   Result Value Ref Range    Prothrombin time 14.9 11.5 - 15.2 sec    INR 1.2 0.8 - 1.2     CBC WITH AUTOMATED DIFF    Collection Time: 01/22/17  2:56 AM   Result Value Ref Range    WBC 12.7 4.6 - 13.2 K/uL    RBC 3.31 (L) 4.20 - 5.30 M/uL    HGB 8.9 (L) 12.0 - 16.0 g/dL    HCT 27.2 (L) 35.0 - 45.0 %    MCV 82.2 74.0 - 97.0 FL    MCH 26.9 24.0 - 34.0 PG    MCHC 32.7 31.0 - 37.0 g/dL    RDW 13.7 11.6 - 14.5 %    PLATELET 151 257 - 832 K/uL    MPV 8.9 (L) 9.2 - 11.8 FL    NEUTROPHILS 82 (H) 40 - 73 %    LYMPHOCYTES 6 (L) 21 - 52 %    MONOCYTES 12 (H) 3 - 10 %    EOSINOPHILS 0 0 - 5 %    BASOPHILS 0 0 - 2 %    ABS. NEUTROPHILS 10.4 (H) 1.8 - 8.0 K/UL    ABS. LYMPHOCYTES 0.8 (L) 0.9 - 3.6 K/UL    ABS.  MONOCYTES 1.5 (H) 0.05 - 1.2 K/UL ABS. EOSINOPHILS 0.0 0.0 - 0.4 K/UL    ABS. BASOPHILS 0.0 0.0 - 0.1 K/UL    DF AUTOMATED     POC G3    Collection Time: 01/22/17  5:40 PM   Result Value Ref Range    Device: NASAL CANNULA      Flow rate (POC) 3.0 L/M    FIO2 (POC) 36 %    pH (POC) 7.398 7.35 - 7.45      pCO2 (POC) 49.2 (H) 35.0 - 45.0 MMHG    pO2 (POC) 80 80 - 100 MMHG    HCO3 (POC) 30.3 (H) 22 - 26 MMOL/L    sO2 (POC) 96 92 - 97 %    Base excess (POC) 5 mmol/L    Allens test (POC) N/A      Total resp. rate 28      Site RIGHT RADIAL      Patient temp.  37.0      Specimen type (POC) ARTERIAL      Performed by Luis Fernando Noland    GLUCOSE, POC    Collection Time: 01/22/17  5:45 PM   Result Value Ref Range    Glucose (POC) 135 (H) 70 - 110 mg/dL   CARDIAC PANEL,(CK, CKMB & TROPONIN)    Collection Time: 01/22/17  5:45 PM   Result Value Ref Range    CK 48 26 - 192 U/L    CK - MB 1.9 0.5 - 3.6 ng/ml    CK-MB Index 4.0 0.0 - 4.0 %    Troponin-I, Qt. 0.03 0.0 - 0.045 NG/ML   PTT    Collection Time: 01/22/17  5:45 PM   Result Value Ref Range    aPTT 26.4 23.0 - 36.4 SEC   PROTHROMBIN TIME + INR    Collection Time: 01/22/17  5:45 PM   Result Value Ref Range    Prothrombin time 14.0 11.5 - 15.2 sec    INR 1.1 0.8 - 1.2     EKG, 12 LEAD, SUBSEQUENT    Collection Time: 01/22/17  5:55 PM   Result Value Ref Range    Ventricular Rate 113 BPM    Atrial Rate 113 BPM    P-R Interval 160 ms    QRS Duration 124 ms    Q-T Interval 320 ms    QTC Calculation (Bezet) 438 ms    Calculated P Axis 50 degrees    Calculated R Axis -42 degrees    Calculated T Axis 115 degrees    Diagnosis       Sinus tachycardia  Left axis deviation  Left bundle branch block  Abnormal ECG  When compared with ECG of 17-JAN-2017 21:50,  No significant change was found  Confirmed by Fortino Santamaria ([id number]) on 1/23/2017 1:06:52 PM     POC G3    Collection Time: 01/22/17  7:11 PM   Result Value Ref Range    Device: BIPAP      FIO2 (POC) 45 %    pH (POC) 7.478 (H) 7.35 - 7.45      pCO2 (POC) 46.0 (H) 35.0 - 45.0 MMHG    pO2 (POC) 163 (H) 80 - 100 MMHG    HCO3 (POC) 34.1 (H) 22 - 26 MMOL/L    sO2 (POC) 100 (H) 92 - 97 %    Base excess (POC) 11 mmol/L    PEEP/CPAP (POC) 5 cmH2O    PIP (POC) 10      Pressure support 5 cmH2O    Allens test (POC) YES      Total resp. rate 14      Site RIGHT RADIAL      Specimen type (POC) ARTERIAL      Performed by Salvador Butler     Spontaneous timed YES     HGB & HCT    Collection Time: 01/22/17  7:45 PM   Result Value Ref Range    HGB 10.2 (L) 12.0 - 16.0 g/dL    HCT 31.1 (L) 35.0 - 45.0 %   CBC WITH AUTOMATED DIFF    Collection Time: 01/23/17 12:42 AM   Result Value Ref Range    WBC 11.8 4.6 - 13.2 K/uL    RBC 3.28 (L) 4.20 - 5.30 M/uL    HGB 8.8 (L) 12.0 - 16.0 g/dL    HCT 26.9 (L) 35.0 - 45.0 %    MCV 82.0 74.0 - 97.0 FL    MCH 26.8 24.0 - 34.0 PG    MCHC 32.7 31.0 - 37.0 g/dL    RDW 13.8 11.6 - 14.5 %    PLATELET 310 051 - 808 K/uL    MPV 9.3 9.2 - 11.8 FL    NEUTROPHILS 80 (H) 40 - 73 %    LYMPHOCYTES 9 (L) 21 - 52 %    MONOCYTES 11 (H) 3 - 10 %    EOSINOPHILS 0 0 - 5 %    BASOPHILS 0 0 - 2 %    ABS. NEUTROPHILS 9.4 (H) 1.8 - 8.0 K/UL    ABS. LYMPHOCYTES 1.1 0.9 - 3.6 K/UL    ABS. MONOCYTES 1.3 (H) 0.05 - 1.2 K/UL    ABS. EOSINOPHILS 0.0 0.0 - 0.4 K/UL    ABS.  BASOPHILS 0.0 0.0 - 0.1 K/UL    DF AUTOMATED     CARDIAC PANEL,(CK, CKMB & TROPONIN)    Collection Time: 01/23/17 12:42 AM   Result Value Ref Range    CK 41 26 - 192 U/L    CK - MB 1.3 0.5 - 3.6 ng/ml    CK-MB Index 3.2 0.0 - 4.0 %    Troponin-I, Qt. 0.08 (H) 0.0 - 0.045 NG/ML   PTT    Collection Time: 01/23/17 12:42 AM   Result Value Ref Range    aPTT 95.2 (H) 23.0 - 36.4 SEC   PROTHROMBIN TIME + INR    Collection Time: 01/23/17 12:42 AM   Result Value Ref Range    Prothrombin time 15.2 11.5 - 15.2 sec    INR 1.2 0.8 - 1.2     METABOLIC PANEL, BASIC    Collection Time: 01/23/17 12:42 AM   Result Value Ref Range    Sodium 137 136 - 145 mmol/L    Potassium 4.3 3.5 - 5.5 mmol/L    Chloride 100 100 - 108 mmol/L    CO2 32 21 - 32 mmol/L Anion gap 5 3.0 - 18 mmol/L    Glucose 114 (H) 74 - 99 mg/dL    BUN 20 (H) 7.0 - 18 MG/DL    Creatinine 0.72 0.6 - 1.3 MG/DL    BUN/Creatinine ratio 28 (H) 12 - 20      GFR est AA >60 >60 ml/min/1.73m2    GFR est non-AA >60 >60 ml/min/1.73m2    Calcium 9.0 8.5 - 10.1 MG/DL   MAGNESIUM    Collection Time: 01/23/17 12:42 AM   Result Value Ref Range    Magnesium 2.2 1.8 - 2.4 mg/dL   GLUCOSE, POC    Collection Time: 01/23/17 12:52 AM   Result Value Ref Range    Glucose (POC) 119 (H) 70 - 110 mg/dL   PTT    Collection Time: 01/23/17  5:58 AM   Result Value Ref Range    aPTT 150.4 (HH) 23.0 - 36.4 SEC   GLUCOSE, POC    Collection Time: 01/23/17  6:31 AM   Result Value Ref Range    Glucose (POC) 103 70 - 110 mg/dL       No past medical history on file. No past surgical history on file. Social History     Social History    Marital status: UNKNOWN     Spouse name: N/A    Number of children: N/A    Years of education: N/A     Occupational History    Not on file. Social History Main Topics    Smoking status: Not on file    Smokeless tobacco: Not on file    Alcohol use Not on file    Drug use: Not on file    Sexual activity: Not on file     Other Topics Concern    Not on file     Social History Narrative       No family history on file.     Allergies   Allergen Reactions    Morphine Nausea and Vomiting     Critical care time 32 minutes     Follow-up Disposition: Not on File    Horace Díaz MD

## 2017-01-23 NOTE — CDMP QUERY
Please clarify acuity of \"Recurrent PE\"  Acute recurrent PE  Other-please specify      Recurrent Acute PE   The \"recurrent\" acute PE time period begins with the diagnosis of the recurrent acute PE and end 3-12 months beyond that time. QUESTIONS?    Jone Rayo RN BS CCDS [phone]

## 2017-01-23 NOTE — PROCEDURES
[facility]  *** [facility] RE[facility] ***    Name: Joseph Flaherty  MRN: [id number]    Inpatient  : 31 Mar 1940  HIS Order #: [id number]  [address] [facility] Visit #: [id number]  Date: 2017    TYPE OF TEST: Peripheral Venous Testing    REASON FOR TEST    Right Arm:-  Deep venous thrombosis:           No  Superficial venous thrombosis:    No    Left Arm:-  Deep venous thrombosis:           No  Superficial venous thrombosis:    No      INTERPRETATION/FINDINGS  Duplex images were obtained using 2-D gray scale, color flow, and  spectral Doppler analysis. 1. No evidence of deep venous thrombosis detected in the veins  visualized. 2. Deep veins visualized include the internal jugular, subclavian,  axillary and brachial veins. 3. No evidence of superficial thrombosis detected. 4. Superficial veins visualized include the basilic (upper arm) and  cephalic (upper arm) veins. 5. No evidence of deep vein thrombosis in the contralateral subclavian   or internal jugular veins. ADDITIONAL COMMENTS    I have personally reviewed the data relevant to the interpretation of  this  study.     TECHNOLOGIST: Ketty Rodriges RVT  Signed: 2017 03:21 PM    PHYSICIAN: Franck Adame MD  Signed: 2017 04:15 PM

## 2017-01-23 NOTE — PROGRESS NOTES
vss afeb  Neuro stable  Events noted  PE last night  On heparin  Surgery cancelled. AP  Decision as to if this patient is most appropriately  treated thru a hospice approach or if we should continue to consider surgery I believe needs to be reviewed by treatment team.    For me to go ahead with stabilization of cervical spine patients pulmonary status will need to be stabilized to a degree so it is acceptable for surgery and an IVC filter will need to be placed to prevent re-embolization( if clot came from dvt). This is a difficult decision. It has been. This recent turn of events only makes it more so.

## 2017-01-23 NOTE — ROUTINE PROCESS
Assumed care. Awake and bipap noted to be disconnected and removed by patient. Instructed to keep it on to prevent her from being intubated. Hob up to 30 degrees. Two new peripheral IV Started over left hand and right wrist.Heparin drip at 18 units per kg/hour via left hand piv. Also with Normal SALINE AT KVO Rate.

## 2017-01-23 NOTE — ROUTINE PROCESS
CHANGE OF SHIFT REPORT GIVEN TO Jeramie Miguel RN BY RICARDO MCGEE RN USING SBAR MAR KARDEX. CRITICAL DRIPS  VERIFIED.

## 2017-01-23 NOTE — PROGRESS NOTES
PCCM FOLLOW-UP:    Met with family at bedside to discuss current condition, prognosis, treatment plans and goals of care. Patient is able to participate in conversation. Participants:  Providers: Dr. Jarvis Mi; Myself; Ramez Ruiz PA-C                        Family members                        RN                        Palliative Care    Discussion encompassing acute change in condition and need for intervention   Therapeutic options, response discussed. Answered all questions and concerns to the best of my ability. Discussed code status, comfort measure options. Following recommendations:  · Pt agrees with plan to transition to comfort care measures only, to include:  · Transition to DNR/DNI status  · Hospice care - to be set up by Palliative Care team      Family updated on care plans. Total Time: 20mins   >50% of time in counseling / coordination?  Yes

## 2017-01-23 NOTE — CONSULTS
Discussion with patient - two cousins and 2nd cousin at bedside. Meeting jointly with oncology and ICU team along with palliative team.  Patient desires transition back to DNR/DNI and states awareness that \"all of that\" would not be good at this point. Introduced conversations of comfort measures here at hospital - patient verbalizes awareness that this is the best option but shares that it is just too much to think about today. Family has indicated that facility placement with comfort goal would be better option than home. Have asked that case management follow up with patient tomorrow per patient request.  This provider will plan on follow up with patient tomorrow also. Full note to follow.

## 2017-01-23 NOTE — ROUTINE PROCESS
REC'D PT WITHOUT REPORT, JUST BROUGHT IN TO THE ICU, VIA BED WITH 2 4TH FLOOR NURSES. VERBAL REPORT AT THE DESK WHILE PT SAT IN 4TH FLOOR  BED, ON BIPAP. WAS THEN TRANSFERRED TO ICU BED AND CONNECTED TO MONITORS. ALERT, FOLLOWS COMMANDS. BIPAP 10/5 RATE 8, FIO2 40%. REC'ING HEPARIN DRIP AT 18 UN/KG/HR, 16.3 MLS HR, NS AT 75 MLS HR.  CLEANED OF URINARY INCONT. HAS INCONT DERMATITIS TO SACRAL AND BUTTOCKS AREA. SMALL SCATTERED OPEN AREAS NOTED, MEPILEX APPLIED. #20 ANGIO INSERTED IN RT WRIST  BY ALEX BURGOS.  CAME HERE WITH A # 20 ANGIO IN LEFT A/C.  LARGE BULKY DRESSING TO LEFT BREAST PRESENT.

## 2017-01-23 NOTE — ROUTINE PROCESS
Dressing to left breast changed. New vasiline gauze applied over area and 4x4's and abd pad. Breast hard to touch, Breqast skin like cauliflower and black and brown areas and some yellow blisters on top. Patient tolerated well.

## 2017-01-23 NOTE — PROGRESS NOTES
GEN SURG    Seen in ICU, reverse of yesterday, Alert, awake , oriented, comfortable and pleasant  Denies Left breast pain( Fungating mass)  CTScan chest: positive for multiple subsegmental filling defects Pulmonary arteries right/left descending                           Multiple nodule bilateral lung , mediastinum , bones and multiple Left axilly lymph noeds                          suggestive of Mets

## 2017-01-24 NOTE — CONSULTS
[facility]: [phone]  [facility]: [address] [place] [address] Way: [phone]    Patient Name: Javier Jason  YOB: 1940    Date of Initial Consult: 17  Reason for Consult: goals of care  Requesting Provider: Dr. Kerrie Linder  Primary Care Physician: Murrel Apgar, MD      SUMMARY:   Javier Jason is a 68y.o. year old with a past history of PE on warfarin, who was admitted on 2017 from home with a diagnosis of neck pain and left breast mass. Current medical issues leading to Palliative Medicine involvement include: goals of care, Mass left breast and Destructive lesion C2/3/4/5 concerning for metastatic disease causing cord compression. She has left Breast mass and concern for mets to bone, lungs, adrenal.     PALLIATIVE DIAGNOSES:   1. Left Breast mass   2. Destructive lesion C2/3/4/5 concerning for metastatic disease causing cord compression. 3. Pulmonary embolism  4. Debility     PLAN:   1. Follow up with patient, cousin and 2nd cousin at bedside for conversation. Patient relays how difficult all this has been for her and reviewed medical situation at this point. Ms. Sacha Wiley is appreciative of repeating situation and what it means to her. She relay ultimately how difficult it is for her to communicate and to trust people because of her past relationships. At this point, patient is agreeable to no escalation of care including no initiation of pressors. She is having difficulty with transition to full comfort measures. 2. She agrees (and family will be present) to initiate conversations regarding discharge plan. Have discussed with patient and family discharge with hospice / comfort goal.    3. Initial consult note routed to primary continuity provider  4.  Communicated plan of care with: Palliative IDT, patient, family at bedside (with patient permission), ICU team     GOALS OF CARE:     [====Goals of Care====]  GOALS OF CARE:  Patient / health care proxy stated goals: to process what is going on    TREATMENT PREFERENCES:   Code Status: DNR    Advance Care Planning:  Advance Care Planning 1/18/2017   Patient's Healthcare Decision Maker is: Legal Next of Gilma Flynn   Primary Decision Maker Name Carroll escalante)   Primary Decision Maker Phone Number [phone] or [phone]   Primary Decision Maker Relationship to Patient Other relative   Secondary Decision Maker Name Clay Daniel (lary)   Secondary Decision Maker Phone Number [phone] or [phone]   Secondary Decision Maker Relationship to Patient Other relative   Confirm Advance Directive None   Patient Would Like to Complete Advance Directive Yes   Does the patient have other document types Do Not Resuscitate     Other:    The palliative care team has discussed with patient / health care proxy about goals of care / treatment preferences for patient.  [====Goals of Care====]    Advance Care Planning 1/18/2017   Patient's Healthcare Decision Maker is: Legal Next of Gilma Flynn   Primary Decision Maker Name Carroll Medeiros (lary)   Primary Decision Maker Phone Number [phone] or [phone]   Primary Decision Maker Relationship to Patient Other relative   Secondary Decision Maker Name Clay escalante)   Secondary Decision Maker Phone Number [phone] or [phone]   Secondary Decision Maker Relationship to Patient Other relative   Confirm Advance Directive None   Patient Would Like to Complete Advance Directive Yes   Does the patient have other document types Do Not Resuscitate       HISTORY:     History obtained from: patient    CHIEF COMPLAINT: Jasiel Page is a 68y.o. year old with a past history of PE on warfarin, who was admitted on 1/17/2017 from home with a diagnosis of neck pain and left breast mass.  Current medical issues leading to Palliative Medicine involvement include: goals of care, Mass left breast and Destructive lesion C2/3/4/5 concerning for metastatic disease causing cord compression. She has left Breast mass and concern for mets to bone, lungs, adrenal.    HPI/SUBJECTIVE:    The patient is:   [x] Verbal and participatory  [] Non-participatory due to:      FUNCTIONAL ASSESSMENT:     Palliative Performance Scale (PPS): 50  PPS: 30       PSYCHOSOCIAL/SPIRITUAL SCREENING:     Advance Care Planning:  Advance Care Planning 1/18/2017   Patient's Healthcare Decision Maker is: Legal Next of Gilma 69   Primary Decision Maker Name Yong Dennison (cousin)   Primary Decision Maker Phone Number [phone] or [phone]   Primary Decision Maker Relationship to Patient Other relative   Secondary Decision Maker Name Corbin Chapa (cousin)   Secondary Decision Maker Phone Number [phone] or [phone]   Secondary Decision Maker Relationship to Patient Other relative   Confirm Advance Directive None   Patient Would Like to Complete Advance Directive Yes   Does the patient have other document types Do Not Resuscitate      Any spiritual / Worship concerns:  [] Yes /  [x] No    Caregiver Burnout:  [] Yes /  [x] No /  [] No Caregiver Present      Anticipatory grief assessment:   [x] Normal  / [] Maladaptive       ESAS Anxiety: Anxiety: 4    ESAS Depression: Depression: 2       REVIEW OF SYSTEMS:     Positive and pertinent negative findings in ROS are noted above in HPI.   The following systems were [x] reviewed / [] unable to be reviewed as noted in HPI  Constitutional -    Respiratory - denies current shortness of breath  Cardiac - denies chest pain    Gastrointestinal - Denies n/v/constipation  Musculoskeletal - states pain in neck, but that she takes percocet with relief  Psychiatric - admits to continued anxiety over current situation; shares she has a great deal of burden at this time but does not want to elaborate    Modified ESAS Completed by: provider   Fatigue: 0 Drowsiness: 0   Depression: 2 Pain: 1   Anxiety: 4 Nausea: 0   Anorexia: 1 Dyspnea: 0   Best Well-Being: 3 Constipation: No   Other Problem (Comment): 0 Stool Occurrence(s): 0        PHYSICAL EXAM:     Wt Readings from Last 3 Encounters:   01/22/17 86.9 kg (191 lb 9.3 oz)   01/16/17 90.7 kg (200 lb)     Blood pressure 163/82, pulse 90, temperature 98.5 °F (36.9 °C), resp. rate 18, height 5' 3\" (1.6 m), weight 86.9 kg (191 lb 9.3 oz), SpO2 95 %, not currently breastfeeding. Pain:  Pain Scale 1: Numeric (0 - 10)  Pain Intensity 1: 0     Pain Location 1: Neck  Pain Orientation 1: Posterior  Pain Description 1: Aching, Sharp  Pain Intervention(s) 1: Medication (see MAR) (percocet 1 tab po given)    Constitutional:  Alert, NAD  Eyes: pupils equal, anicteric  ENMT: no nasal discharge, moist mucous membranes  Cardiovascular: regular rhythm, distal pulses intact  Respiratory: breathing not labored  Gastrointestinal: soft non-tender, +bowel sounds  Musculoskeletal: little movement of neck d/t destructive lesion  Skin: warm, dry  Neurologic: following commands, moving all extremities  Psychiatric: full affect, oriented x 4     HISTORY:     Active Problems:    Neck mass (1/17/2017)      Malignant neoplasm of upper-outer quadrant of left female breast ([address] (1/19/2017)      Bone metastasis ([address] (1/19/2017)      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 No past surgical history on file. No family history on file. History reviewed, no pertinent family history.   Social History   Substance Use Topics    Smoking status: Not on file    Smokeless tobacco: Not on file    Alcohol use Not on file     Allergies   Allergen Reactions    Morphine Nausea and Vomiting      Current Facility-Administered Medications   Medication Dose Route Frequency    glucose chewable tablet 16 g  4 Tab Oral PRN    glucagon (GLUCAGEN) injection 1 mg  1 mg IntraMUSCular PRN    dextrose (D50W) injection syrg 12.5-25 g  25-50 mL IntraVENous PRN    sodium chloride (NS) flush 5-10 mL  5-10 mL IntraVENous Q8H    sodium chloride (NS) flush 5-10 mL  5-10 mL IntraVENous PRN    famotidine (PEPCID) tablet 20 mg  20 mg Oral ON CALL TO OR    celecoxib (CELEBREX) capsule 400 mg  400 mg Oral ON CALL TO OR    pregabalin (LYRICA) capsule 50 mg  50 mg Oral ON CALL TO OR    heparin 25,000 units in D5W 250 ml infusion  18-36 Units/kg/hr IntraVENous TITRATE    0.9% sodium chloride infusion  75 mL/hr IntraVENous CONTINUOUS    albuterol-ipratropium (DUO-NEB) 2.5 MG-0.5 MG/3 ML  3 mL Nebulization Q4H RT    LORazepam (ATIVAN) injection 1 mg  1 mg IntraVENous Q6H PRN    0.9% sodium chloride infusion 250 mL  250 mL IntraVENous PRN    dexamethasone (DECADRON) 4 mg/mL injection 4 mg  4 mg IntraVENous Q6H    0.9% sodium chloride infusion 250 mL  250 mL IntraVENous PRN    calcium acetate-aluminum sulf (DOMEBORO) 5 Packet, sodium chloride irrigation 0.9 % 500 mL   Topical BID    ondansetron (ZOFRAN) injection 4 mg  4 mg IntraVENous Q4H PRN    influenza vaccine 2016-17 (36mos+)(PF) (FLUZONE/FLUARIX/FLULAVAL QUAD) injection 0.5 mL  0.5 mL IntraMUSCular PRIOR TO DISCHARGE    HYDROmorphone (DILAUDID) syringe 0.5 mg  0.5 mg IntraVENous Q4H PRN    oxyCODONE-acetaminophen (PERCOCET) 5-325 mg per tablet 1 Tab  1 Tab Oral Q4H PRN    acetaminophen (TYLENOL) tablet 500 mg  500 mg Oral Q6H PRN    famotidine (PEPCID) tablet 20 mg  20 mg Oral BID    docusate sodium (COLACE) capsule 100 mg  100 mg Oral BID    multivitamin, tx-iron-ca-min (THERA-M w/ IRON) tablet 1 Tab  1 Tab Oral DAILY      LAB AND IMAGING FINDINGS:     Lab Results   Component Value Date/Time    WBC 11.7 01/24/2017 02:50 AM    HGB 8.6 01/24/2017 02:50 AM    PLATELET 993 85/97/5249 02:50 AM     Lab Results   Component Value Date/Time Sodium 139 01/24/2017 02:50 AM    Potassium 4.2 01/24/2017 02:50 AM    Chloride 102 01/24/2017 02:50 AM    CO2 28 01/24/2017 02:50 AM    BUN 22 01/24/2017 02:50 AM    Creatinine 0.84 01/24/2017 02:50 AM    Calcium 8.8 01/24/2017 02:50 AM    Magnesium 2.2 01/23/2017 12:42 AM      Lab Results   Component Value Date/Time    AST 35 01/24/2017 02:50 AM    Alk. phosphatase 53 01/24/2017 02:50 AM    Protein, total 6.0 01/24/2017 02:50 AM    Albumin 2.1 01/24/2017 02:50 AM    Globulin 3.9 01/24/2017 02:50 AM     Lab Results   Component Value Date/Time    INR 1.2 01/24/2017 02:50 AM    Prothrombin time 14.9 01/24/2017 02:50 AM    aPTT 74.5 01/24/2017 02:50 AM      No results found for: IRON, FE, TIBC, IBCT, PSAT, FERR   No results found for: PH, PCO2, PO2  No components found for: [place]   Lab Results   Component Value Date/Time    CK 28 01/24/2017 02:50 AM    CK - MB 1.1 01/24/2017 02:50 AM            Total time: 25 minutes   Counseling / coordination time:  20 minutes  > 50% counseling / coordination?: yes    Prolonged service was provided for  []30 min   []75 min in face to face time in the presence of the patient. Time Start:   Time End:   Note: this can only be billed with 20440 (initial) or 47855 (follow up). If multiple start / stop times, list each separately.

## 2017-01-24 NOTE — PROGRESS NOTES
Palliative Medicine  [facility]: [phone])  [facility]: [phone])      Resuscitation Status: DNR   Durable DNR addressed? [x] Yes   [] No    [] Not Applicable    Advance Care Planning 1/18/2017   Patient's Healthcare Decision Maker is: Legal Next of Gilma 69   Primary Decision Maker Name Maryfrances Oppenheim (cousin)   Primary Decision Maker Phone Number [phone] or [phone]   Primary Decision Maker Relationship to Patient Other relative   Secondary Decision Maker Name Baylee Marsh (cousin)   Secondary Decision Maker Phone Number [phone] or [phone]   Secondary Decision Maker Relationship to Patient Other relative   Confirm Advance Directive None   Patient Would Like to Complete Advance Directive Yes   Does the patient have other document types Do Not Resuscitate     Palliative NP and SW followed up for emotional support and clarification of care goals. Patient was AAOx4 reading a Congregational devotional book. Family (cousins) arrived shortly after our arrival. Patient shared she was feeling overwhelmed with all the medical information. She stated hearing the information over and over was helpful for processing. Family has been a huge support and assistance with understanding information. Recommend sharing/discussing medical information with patient when family is present. Patient became tearful when stated she has a burden, but wasn't ready to share. Encouraged her to share when ready to help with the emotional healing process. Will have Palliative  visit tomorrow. Will introduce advance medical directive for assigning medical POA. Family (3 cousins) would like to be present when CM speak with patient about d/c options. Palliative team will continue to provided support. Thank you for the opportunity to assist in the care of Ms. Piedad Hunter.    Phil Roberts, [facility]  Palliative Medicine

## 2017-01-24 NOTE — PROGRESS NOTES
vss afeb  Events of yesterday noted  Teams discussed patient status with patient and family and decision for palliative care, hospice made. I believe this is a rational ethical approach. I will sign off. Please re-consult if circumstance change.

## 2017-01-24 NOTE — PROGRESS NOTES
[facility] HEMATOLOGY ONCOLOGY       Assessment/ Plan:     Patient Active Problem List   Diagnosis Code    Neck mass R22.1    Malignant neoplasm of upper-outer quadrant of left female breast ([facility] [address] C50.412    Bone metastasis ([facility] [address] C79.51    Spinal cord compression due to malignant neoplasm metastatic to spine (HCC) G95.20, C79.51    Malignant neoplasm metastatic to right lung (HCC) C78.01    Weakness of both arms M62.81    Debility R53.81    Pulmonary embolism (HCC) I26.99     Saw earlier in the day, she is slowly deteriorating  Hospice would be best option, palliative care following    Thank you for the opportunity to participate in the care, please do not hesitate to call for any questions or concerns. I have discussed the diagnosis with the patient and the intended plan. The patient verbalized understanding and agrees with the plan.       History:   Roro Davis is a 68 y.o. female presenting today for Headache  She is found to have spinal cord compression with metastatic breast cancer    Current Facility-Administered Medications   Medication Dose Route Frequency Provider Last Rate Last Dose    insulin lispro (HUMALOG) injection   SubCUTAneous AC&HS Nancy Jacob PA-C   2 Units at 01/24/17 1137    dextrose (D50W) injection syrg 12.5-25 g  25-50 mL IntraVENous PRN Nancy Jacob PA-C        glucose chewable tablet 16 g  4 Tab Oral PRN Nancy Jacob PA-C        glucagon (GLUCAGEN) injection 1 mg  1 mg IntraMUSCular PRN Nancy Jacob PA-C        dextrose (D50W) injection syrg 12.5-25 g  25-50 mL IntraVENous PRN Nancy Jacob PA-C        sodium chloride (NS) flush 5-10 mL  5-10 mL IntraVENous Q8H Mitul Rucker MD   10 mL at 01/24/17 1312    sodium chloride (NS) flush 5-10 mL  5-10 mL IntraVENous PRN Mitul Rucker MD        famotidine (PEPCID) tablet 20 mg  20 mg Oral ON CALL TO OR Mitul Rucker MD   Stopped at 01/23/17 0700    celecoxib (CELEBREX) capsule 400 mg  400 mg Oral ON CALL TO OR Kojo Haley MD   Stopped at 01/23/17 0700    pregabalin (LYRICA) capsule 50 mg  50 mg Oral ON CALL TO OR Kojo Haley MD   Stopped at 01/23/17 0700    heparin 25,000 units in D5W 250 ml infusion  18-36 Units/kg/hr IntraVENous TITRATE Ezio Jimenez MD 13.6 mL/hr at 01/24/17 0746 15 Units/kg/hr at 01/24/17 0746    0.9% sodium chloride infusion  75 mL/hr IntraVENous CONTINUOUS Ezio Jimenez MD 75 mL/hr at 01/24/17 0021 75 mL/hr at 01/24/17 0021    albuterol-ipratropium (DUO-NEB) 2.5 MG-0.5 MG/3 ML  3 mL Nebulization Q4H RT Ezio Jimenez MD   3 mL at 01/24/17 1244    LORazepam (ATIVAN) injection 1 mg  1 mg IntraVENous Q6H PRN Ezio Jimenez MD        0.9% sodium chloride infusion 250 mL  250 mL IntraVENous PRN Paco Ruvalcaba PA-C        dexamethasone (DECADRON) 4 mg/mL injection 4 mg  4 mg IntraVENous Q6H Higinio Mera MD   4 mg at 01/24/17 1127    0.9% sodium chloride infusion 250 mL  250 mL IntraVENous PRN Ezio Jimenez MD        calcium acetate-aluminum sulf (DOMEBORO) 5 Packet, sodium chloride irrigation 0.9 % 500 mL   Topical BID Henrique Hillman MD        ondansetron [facility]) injection 4 mg  4 mg IntraVENous Q4H PRN Henrique Hillman MD        influenza vaccine 2936-68 (36mos+)(PF) (FLUZONE/FLUARIX/FLULAVAL QUAD) injection 0.5 mL  0.5 mL IntraMUSCular PRIOR TO DISCHARGE Henrique Hillman MD        HYDROmorphone (DILAUDID) syringe 0.5 mg  0.5 mg IntraVENous Q4H PRN Ezio Jimenez MD        oxyCODONE-acetaminophen (PERCOCET) 5-325 mg per tablet 1 Tab  1 Tab Oral Q4H PRN Ezio Jimenez MD   1 Tab at 01/24/17 1311    acetaminophen (TYLENOL) tablet 500 mg  500 mg Oral Q6H PRN Ezio Jimenez MD        famotidine (PEPCID) tablet 20 mg  20 mg Oral BID Ezio Jimenez MD   20 mg at 01/24/17 0858    docusate sodium (COLACE) capsule 100 mg  100 mg Oral BID Ezio Jimenez MD   100 mg at 01/24/17 0858    multivitamin, tx-iron-ca-min (THERA-M w/ IRON) tablet 1 Tab  1 Tab Oral DAILY Danay Santa MD   1 Tab at 01/24/17 0858       Objective:   VS:    Visit Vitals    /68 (BP Patient Position: At rest)    Pulse 100    Temp 98.7 °F (37.1 °C)    Resp 20    Ht 5' 3\" (1.6 m)    Wt 86.9 kg (191 lb 9.3 oz)    SpO2 98%    Breastfeeding No    BMI 33.94 kg/m2        Physical Exam:     Constitutional:  Stated age, no acute distress and alert and oriented x 3    HENT:  Oral mucosa pink and moist, no cyanosis observed and no pallor observed.    Eyes:  conjunctiva normal, upper and lower eyelid normal bilaterally.    Neck:  No jugular venous distension present.    Cardiovascular:  heart sounds normal, normal rate and regular rhythm, no murmurs or rubs, no thrills, no S3 or S4 palpable,and no palpable opening snaps. Point of maximal impulse normal. Carotid arteries normal. BP in 2+ extremities not indicated.    Pulmonary/Chest Wall:  breath sounds are coarse bilaterally and no use of accessory muscles in breathing.                         Left breast mass and axillary nodes are almost fixed to chest wall           Recent Results (from the past 168 hour(s))   CBC WITH AUTOMATED DIFF    Collection Time: 01/17/17  9:46 PM   Result Value Ref Range    WBC 12.8 4.6 - 13.2 K/uL    RBC 4.01 (L) 4.20 - 5.30 M/uL    HGB 11.1 (L) 12.0 - 16.0 g/dL    HCT 32.9 (L) 35.0 - 45.0 %    MCV 82.0 74.0 - 97.0 FL    MCH 27.7 24.0 - 34.0 PG    MCHC 33.7 31.0 - 37.0 g/dL    RDW 13.4 11.6 - 14.5 %    PLATELET 729 (H) 584 - 420 K/uL    MPV 8.9 (L) 9.2 - 11.8 FL    NEUTROPHILS 71 42 - 75 %    LYMPHOCYTES 14 (L) 20 - 51 %    MONOCYTES 15 (H) 2 - 9 %    EOSINOPHILS 0 0 - 5 %    BASOPHILS 0 0 - 3 %    ABS. NEUTROPHILS 9.1 (H) 1.8 - 8.0 K/UL    ABS. LYMPHOCYTES 1.8 0.8 - 3.5 K/UL    ABS. MONOCYTES 1.9 (H) 0 - 1.0 K/UL    ABS. EOSINOPHILS 0.0 0.0 - 0.4 K/UL    ABS.  BASOPHILS 0.0 0.0 - 0.06 K/UL    DF MANUAL      PLATELET COMMENTS INCREASED PLATELETS      RBC COMMENTS HYPOCHROMIA  1+       METABOLIC PANEL, COMPREHENSIVE    Collection Time: 01/17/17  9:46 PM   Result Value Ref Range    Sodium 133 (L) 136 - 145 mmol/L    Potassium 3.6 3.5 - 5.5 mmol/L    Chloride 97 (L) 100 - 108 mmol/L    CO2 30 21 - 32 mmol/L    Anion gap 6 3.0 - 18 mmol/L    Glucose 108 (H) 74 - 99 mg/dL    BUN 26 (H) 7.0 - 18 MG/DL    Creatinine 0.95 0.6 - 1.3 MG/DL    BUN/Creatinine ratio 27 (H) 12 - 20      GFR est AA >60 >60 ml/min/1.73m2    GFR est non-AA 57 (L) >60 ml/min/1.73m2    Calcium 10.4 (H) 8.5 - 10.1 MG/DL    Bilirubin, total 0.6 0.2 - 1.0 MG/DL    ALT 38 13 - 56 U/L     (H) 15 - 37 U/L    Alk.  phosphatase 66 45 - 117 U/L    Protein, total 8.7 (H) 6.4 - 8.2 g/dL    Albumin 2.9 (L) 3.4 - 5.0 g/dL    Globulin 5.8 (H) 2.0 - 4.0 g/dL    A-G Ratio 0.5 (L) 0.8 - 1.7     MAGNESIUM    Collection Time: 01/17/17  9:46 PM   Result Value Ref Range    Magnesium 2.1 1.8 - 2.4 mg/dL   PROTHROMBIN TIME + INR    Collection Time: 01/17/17  9:46 PM   Result Value Ref Range    Prothrombin time 21.2 (H) 11.5 - 15.2 sec    INR 1.9 (H) 0.8 - 1.2     PTT    Collection Time: 01/17/17  9:46 PM   Result Value Ref Range    aPTT 35.9 23.0 - 36.4 SEC   CARDIAC PANEL,(CK, CKMB & TROPONIN)    Collection Time: 01/17/17  9:46 PM   Result Value Ref Range     26 - 192 U/L    CK - MB 2.9 0.5 - 3.6 ng/ml    CK-MB Index 2.0 0.0 - 4.0 %    Troponin-I, Qt. 0.02 0.0 - 0.045 NG/ML   EKG, 12 LEAD, INITIAL    Collection Time: 01/17/17  9:50 PM   Result Value Ref Range    Ventricular Rate 103 BPM    Atrial Rate 103 BPM    P-R Interval 158 ms    QRS Duration 126 ms    Q-T Interval 348 ms    QTC Calculation (Bezet) 455 ms    Calculated P Axis 28 degrees    Calculated R Axis -41 degrees    Calculated T Axis 118 degrees    Diagnosis       Sinus tachycardia  Left axis deviation  Left bundle branch block  Abnormal ECG  When compared with ECG of 30-JUN-2012 07:01,  ST elevation has replaced ST depression in Anterior leads  T wave inversion now evident in Lateral leads  Confirmed by Equilla Sizer ([id number]) on 1/18/2017 7:58:45 PM     URINALYSIS W/ RFLX MICROSCOPIC    Collection Time: 01/17/17 11:55 PM   Result Value Ref Range    Color YELLOW      Appearance CLEAR      Specific gravity >1.030 (H) 1.005 - 1.030    pH (UA) 5.5 5.0 - 8.0      Protein NEGATIVE  NEG mg/dL    Glucose NEGATIVE  NEG mg/dL    Ketone NEGATIVE  NEG mg/dL    Bilirubin NEGATIVE  NEG      Blood NEGATIVE  NEG      Urobilinogen 1.0 0.2 - 1.0 EU/dL    Nitrites NEGATIVE  NEG      Leukocyte Esterase TRACE (A) NEG     URINE MICROSCOPIC ONLY    Collection Time: 01/17/17 11:55 PM   Result Value Ref Range    WBC 3 to 5 0 - 4 /hpf    RBC NEGATIVE  0 - 5 /hpf    Epithelial cells FEW 0 - 5 /lpf    Bacteria 1+ (A) NEG /hpf    Uric Acid Crystals 2+ (A) NEG   MRSA SCREEN - PCR (NASAL)    Collection Time: 01/18/17  7:00 AM   Result Value Ref Range    Special Requests: NO SPECIAL REQUESTS      Culture result:        MRSA target DNA is not detected (presumptive not colonized with MRSA)   METABOLIC PANEL, BASIC    Collection Time: 01/18/17  9:12 AM   Result Value Ref Range    Sodium 134 (L) 136 - 145 mmol/L    Potassium 3.7 3.5 - 5.5 mmol/L    Chloride 99 (L) 100 - 108 mmol/L    CO2 28 21 - 32 mmol/L    Anion gap 7 3.0 - 18 mmol/L    Glucose 87 74 - 99 mg/dL    BUN 21 (H) 7.0 - 18 MG/DL    Creatinine 0.68 0.6 - 1.3 MG/DL    BUN/Creatinine ratio 31 (H) 12 - 20      GFR est AA >60 >60 ml/min/1.73m2    GFR est non-AA >60 >60 ml/min/1.73m2    Calcium 9.5 8.5 - 10.1 MG/DL   MAGNESIUM    Collection Time: 01/18/17  9:12 AM   Result Value Ref Range    Magnesium 1.9 1.8 - 2.4 mg/dL   PROTHROMBIN TIME + INR    Collection Time: 01/18/17  6:00 PM   Result Value Ref Range    Prothrombin time 25.2 (H) 11.5 - 15.2 sec    INR 2.4 (H) 0.8 - 1.2     CULTURE, BLOOD    Collection Time: 01/18/17  7:13 PM   Result Value Ref Range    Special Requests: NO SPECIAL REQUESTS      Culture result: NO GROWTH 6 DAYS CULTURE, BLOOD    Collection Time: 01/18/17  7:19 PM   Result Value Ref Range    Special Requests: NO SPECIAL REQUESTS      Culture result: NO GROWTH 6 DAYS     CBC WITH AUTOMATED DIFF    Collection Time: 01/19/17  3:53 AM   Result Value Ref Range    WBC 11.8 4.6 - 13.2 K/uL    RBC 3.29 (L) 4.20 - 5.30 M/uL    HGB 8.9 (L) 12.0 - 16.0 g/dL    HCT 27.3 (L) 35.0 - 45.0 %    MCV 83.0 74.0 - 97.0 FL    MCH 27.1 24.0 - 34.0 PG    MCHC 32.6 31.0 - 37.0 g/dL    RDW 13.6 11.6 - 14.5 %    PLATELET 673 665 - 120 K/uL    MPV 8.8 (L) 9.2 - 11.8 FL    NEUTROPHILS 74 (H) 40 - 73 %    LYMPHOCYTES 14 (L) 21 - 52 %    MONOCYTES 12 (H) 3 - 10 %    EOSINOPHILS 0 0 - 5 %    BASOPHILS 0 0 - 2 %    ABS. NEUTROPHILS 8.7 (H) 1.8 - 8.0 K/UL    ABS. LYMPHOCYTES 1.6 0.9 - 3.6 K/UL    ABS. MONOCYTES 1.5 (H) 0.05 - 1.2 K/UL    ABS. EOSINOPHILS 0.0 0.0 - 0.4 K/UL    ABS. BASOPHILS 0.0 0.0 - 0.1 K/UL    DF AUTOMATED     METABOLIC PANEL, COMPREHENSIVE    Collection Time: 01/19/17  3:53 AM   Result Value Ref Range    Sodium 134 (L) 136 - 145 mmol/L    Potassium 3.6 3.5 - 5.5 mmol/L    Chloride 101 100 - 108 mmol/L    CO2 27 21 - 32 mmol/L    Anion gap 6 3.0 - 18 mmol/L    Glucose 119 (H) 74 - 99 mg/dL    BUN 16 7.0 - 18 MG/DL    Creatinine 0.79 0.6 - 1.3 MG/DL    BUN/Creatinine ratio 20 12 - 20      GFR est AA >60 >60 ml/min/1.73m2    GFR est non-AA >60 >60 ml/min/1.73m2    Calcium 8.9 8.5 - 10.1 MG/DL    Bilirubin, total 0.5 0.2 - 1.0 MG/DL    ALT 22 13 - 56 U/L    AST 58 (H) 15 - 37 U/L    Alk.  phosphatase 47 45 - 117 U/L    Protein, total 6.3 (L) 6.4 - 8.2 g/dL    Albumin 1.9 (L) 3.4 - 5.0 g/dL    Globulin 4.4 (H) 2.0 - 4.0 g/dL    A-G Ratio 0.4 (L) 0.8 - 1.7     MAGNESIUM    Collection Time: 01/19/17  3:53 AM   Result Value Ref Range    Magnesium 1.9 1.8 - 2.4 mg/dL   PROTHROMBIN TIME + INR    Collection Time: 01/19/17  3:53 AM   Result Value Ref Range    Prothrombin time 24.1 (H) 11.5 - 15.2 sec    INR 2.3 (H) 0.8 - 1.2     CEA    Collection Time: 01/19/17 12:10 PM   Result Value Ref Range    CEA 19.5 ng/mL   CA 27.29    Collection Time: 01/19/17 12:10 PM   Result Value Ref Range    CA 27.29 49.4 (H) 0.0 - 38.6 U/mL   FFP, ALLOCATE    Collection Time: 01/19/17  1:00 PM   Result Value Ref Range    CALLED TO:       MARYCHUY BOLAÑOS, [address] [place] [address], AT 16:13 ON 1/19/2017 BY [facility]    Unit number [id number]     Blood component type FP24H,THAW     Unit division 00     Status of unit TRANSFUSED     Unit number [id number]     Blood component type FP24H,THAW     Unit division 00     Status of unit TRANSFUSED    TYPE & SCREEN    Collection Time: 01/19/17  1:30 PM   Result Value Ref Range    Crossmatch Expiration 01/22/2017     ABO/Rh(D) Almas Ortiz NEGATIVE     Antibody screen NEG    VANCOMYCIN, TROUGH    Collection Time: 01/20/17  5:03 AM   Result Value Ref Range    Vancomycin,trough 2.5 (L) 10.0 - 20.0 ug/mL    Reported dose date: 20170119      Reported dose time: 1100      Reported dose: 1000 MG UNITS   METABOLIC PANEL, BASIC    Collection Time: 01/20/17  5:03 AM   Result Value Ref Range    Sodium 136 136 - 145 mmol/L    Potassium 3.8 3.5 - 5.5 mmol/L    Chloride 101 100 - 108 mmol/L    CO2 28 21 - 32 mmol/L    Anion gap 7 3.0 - 18 mmol/L    Glucose 116 (H) 74 - 99 mg/dL    BUN 13 7.0 - 18 MG/DL    Creatinine 0.67 0.6 - 1.3 MG/DL    BUN/Creatinine ratio 19 12 - 20      GFR est AA >60 >60 ml/min/1.73m2    GFR est non-AA >60 >60 ml/min/1.73m2    Calcium 9.3 8.5 - 10.1 MG/DL   CBC WITH AUTOMATED DIFF    Collection Time: 01/20/17  5:03 AM   Result Value Ref Range    WBC 12.3 4.6 - 13.2 K/uL    RBC 3.36 (L) 4.20 - 5.30 M/uL    HGB 9.0 (L) 12.0 - 16.0 g/dL    HCT 27.5 (L) 35.0 - 45.0 %    MCV 81.8 74.0 - 97.0 FL    MCH 26.8 24.0 - 34.0 PG    MCHC 32.7 31.0 - 37.0 g/dL    RDW 13.5 11.6 - 14.5 %    PLATELET 945 480 - 730 K/uL    MPV 9.0 (L) 9.2 - 11.8 FL    NEUTROPHILS 86 (H) 40 - 73 %    LYMPHOCYTES 8 (L) 21 - 52 %    MONOCYTES 6 3 - 10 %    EOSINOPHILS 0 0 - 5 %    BASOPHILS 0 0 - 2 %    ABS. NEUTROPHILS 10.6 (H) 1.8 - 8.0 K/UL    ABS. LYMPHOCYTES 0.9 0.9 - 3.6 K/UL    ABS. MONOCYTES 0.8 0.05 - 1.2 K/UL    ABS. EOSINOPHILS 0.0 0.0 - 0.4 K/UL    ABS. BASOPHILS 0.0 0.0 - 0.1 K/UL    DF AUTOMATED     MAGNESIUM    Collection Time: 01/20/17  5:03 AM   Result Value Ref Range    Magnesium 2.0 1.8 - 2.4 mg/dL   PROTHROMBIN TIME + INR    Collection Time: 01/20/17  5:03 AM   Result Value Ref Range    Prothrombin time 16.6 (H) 11.5 - 15.2 sec    INR 1.4 (H) 0.8 - 1.2     METABOLIC PANEL, BASIC    Collection Time: 01/21/17  4:29 AM   Result Value Ref Range    Sodium 138 136 - 145 mmol/L    Potassium 4.0 3.5 - 5.5 mmol/L    Chloride 104 100 - 108 mmol/L    CO2 27 21 - 32 mmol/L    Anion gap 7 3.0 - 18 mmol/L    Glucose 139 (H) 74 - 99 mg/dL    BUN 16 7.0 - 18 MG/DL    Creatinine 0.66 0.6 - 1.3 MG/DL    BUN/Creatinine ratio 24 (H) 12 - 20      GFR est AA >60 >60 ml/min/1.73m2    GFR est non-AA >60 >60 ml/min/1.73m2    Calcium 9.0 8.5 - 10.1 MG/DL   CBC WITH AUTOMATED DIFF    Collection Time: 01/21/17  4:29 AM   Result Value Ref Range    WBC 14.0 (H) 4.6 - 13.2 K/uL    RBC 3.24 (L) 4.20 - 5.30 M/uL    HGB 8.7 (L) 12.0 - 16.0 g/dL    HCT 26.4 (L) 35.0 - 45.0 %    MCV 81.5 74.0 - 97.0 FL    MCH 26.9 24.0 - 34.0 PG    MCHC 33.0 31.0 - 37.0 g/dL    RDW 13.4 11.6 - 14.5 %    PLATELET 271 777 - 829 K/uL    MPV 9.0 (L) 9.2 - 11.8 FL    NEUTROPHILS 89 (H) 40 - 73 %    LYMPHOCYTES 6 (L) 21 - 52 %    MONOCYTES 5 3 - 10 %    EOSINOPHILS 0 0 - 5 %    BASOPHILS 0 0 - 2 %    ABS. NEUTROPHILS 12.6 (H) 1.8 - 8.0 K/UL    ABS. LYMPHOCYTES 0.8 (L) 0.9 - 3.6 K/UL    ABS. MONOCYTES 0.6 0.05 - 1.2 K/UL    ABS. EOSINOPHILS 0.0 0.0 - 0.4 K/UL    ABS.  BASOPHILS 0.0 0.0 - 0.1 K/UL    DF AUTOMATED     PROTHROMBIN TIME + INR    Collection Time: 01/21/17  4:29 AM   Result Value Ref Range    Prothrombin time 15.2 11.5 - 15.2 sec    INR 1.2 0.8 - 1.2     TYPE & CROSSMATCH    Collection Time: 01/21/17 12:25 PM   Result Value Ref Range Crossmatch Expiration 01/24/2017     ABO/Rh(D) O NEGATIVE     Antibody screen NEG     Unit number [id number]     Blood component type RC LR AS1     Unit division 00     Status of unit ALLOCATED     Crossmatch result Compatible     Unit number [id number]     Blood component type RC LR AS1     Unit division 00     Status of unit ALLOCATED     Crossmatch result Compatible    VANCOMYCIN, TROUGH    Collection Time: 01/21/17  9:55 PM   Result Value Ref Range    Vancomycin,trough 4.7 (L) 10.0 - 20.0 ug/mL    Reported dose date: 20170121      Reported dose time: 1000      Reported dose: 1000 MG UNITS   METABOLIC PANEL, BASIC    Collection Time: 01/22/17  2:56 AM   Result Value Ref Range    Sodium 138 136 - 145 mmol/L    Potassium 4.1 3.5 - 5.5 mmol/L    Chloride 101 100 - 108 mmol/L    CO2 30 21 - 32 mmol/L    Anion gap 7 3.0 - 18 mmol/L    Glucose 142 (H) 74 - 99 mg/dL    BUN 21 (H) 7.0 - 18 MG/DL    Creatinine 0.80 0.6 - 1.3 MG/DL    BUN/Creatinine ratio 26 (H) 12 - 20      GFR est AA >60 >60 ml/min/1.73m2    GFR est non-AA >60 >60 ml/min/1.73m2    Calcium 9.4 8.5 - 10.1 MG/DL   PROTHROMBIN TIME + INR    Collection Time: 01/22/17  2:56 AM   Result Value Ref Range    Prothrombin time 14.9 11.5 - 15.2 sec    INR 1.2 0.8 - 1.2     CBC WITH AUTOMATED DIFF    Collection Time: 01/22/17  2:56 AM   Result Value Ref Range    WBC 12.7 4.6 - 13.2 K/uL    RBC 3.31 (L) 4.20 - 5.30 M/uL    HGB 8.9 (L) 12.0 - 16.0 g/dL    HCT 27.2 (L) 35.0 - 45.0 %    MCV 82.2 74.0 - 97.0 FL    MCH 26.9 24.0 - 34.0 PG    MCHC 32.7 31.0 - 37.0 g/dL    RDW 13.7 11.6 - 14.5 %    PLATELET 735 994 - 964 K/uL    MPV 8.9 (L) 9.2 - 11.8 FL    NEUTROPHILS 82 (H) 40 - 73 %    LYMPHOCYTES 6 (L) 21 - 52 %    MONOCYTES 12 (H) 3 - 10 %    EOSINOPHILS 0 0 - 5 %    BASOPHILS 0 0 - 2 %    ABS. NEUTROPHILS 10.4 (H) 1.8 - 8.0 K/UL    ABS. LYMPHOCYTES 0.8 (L) 0.9 - 3.6 K/UL    ABS. MONOCYTES 1.5 (H) 0.05 - 1.2 K/UL    ABS. EOSINOPHILS 0.0 0.0 - 0.4 K/UL    ABS. BASOPHILS 0.0 0.0 - 0.1 K/UL    DF AUTOMATED     POC G3    Collection Time: 01/22/17  5:40 PM   Result Value Ref Range    Device: NASAL CANNULA      Flow rate (POC) 3.0 L/M    FIO2 (POC) 36 %    pH (POC) 7.398 7.35 - 7.45      pCO2 (POC) 49.2 (H) 35.0 - 45.0 MMHG    pO2 (POC) 80 80 - 100 MMHG    HCO3 (POC) 30.3 (H) 22 - 26 MMOL/L    sO2 (POC) 96 92 - 97 %    Base excess (POC) 5 mmol/L    Allens test (POC) N/A      Total resp. rate 28      Site RIGHT RADIAL      Patient temp.  37.0      Specimen type (POC) ARTERIAL      Performed by Luis Fernando Noland    GLUCOSE, POC    Collection Time: 01/22/17  5:45 PM   Result Value Ref Range    Glucose (POC) 135 (H) 70 - 110 mg/dL   CARDIAC PANEL,(CK, CKMB & TROPONIN)    Collection Time: 01/22/17  5:45 PM   Result Value Ref Range    CK 48 26 - 192 U/L    CK - MB 1.9 0.5 - 3.6 ng/ml    CK-MB Index 4.0 0.0 - 4.0 %    Troponin-I, Qt. 0.03 0.0 - 0.045 NG/ML   PTT    Collection Time: 01/22/17  5:45 PM   Result Value Ref Range    aPTT 26.4 23.0 - 36.4 SEC   PROTHROMBIN TIME + INR    Collection Time: 01/22/17  5:45 PM   Result Value Ref Range    Prothrombin time 14.0 11.5 - 15.2 sec    INR 1.1 0.8 - 1.2     EKG, 12 LEAD, SUBSEQUENT    Collection Time: 01/22/17  5:55 PM   Result Value Ref Range    Ventricular Rate 113 BPM    Atrial Rate 113 BPM    P-R Interval 160 ms    QRS Duration 124 ms    Q-T Interval 320 ms    QTC Calculation (Bezet) 438 ms    Calculated P Axis 50 degrees    Calculated R Axis -42 degrees    Calculated T Axis 115 degrees    Diagnosis       Sinus tachycardia  Left axis deviation  Left bundle branch block  Abnormal ECG  When compared with ECG of 17-JAN-2017 21:50,  No significant change was found  Confirmed by Fortino Santamaria ([id number]) on 1/23/2017 1:06:52 PM     POC G3    Collection Time: 01/22/17  7:11 PM   Result Value Ref Range    Device: BIPAP      FIO2 (POC) 45 %    pH (POC) 7.478 (H) 7.35 - 7.45      pCO2 (POC) 46.0 (H) 35.0 - 45.0 MMHG    pO2 (POC) 163 (H) 80 - 100 MMHG    HCO3 (POC) 34.1 (H) 22 - 26 MMOL/L    sO2 (POC) 100 (H) 92 - 97 %    Base excess (POC) 11 mmol/L    PEEP/CPAP (POC) 5 cmH2O    PIP (POC) 10      Pressure support 5 cmH2O    Allens test (POC) YES      Total resp. rate 14      Site RIGHT RADIAL      Specimen type (POC) ARTERIAL      Performed by Salomón Van     Spontaneous timed YES     HGB & HCT    Collection Time: 01/22/17  7:45 PM   Result Value Ref Range    HGB 10.2 (L) 12.0 - 16.0 g/dL    HCT 31.1 (L) 35.0 - 45.0 %   CBC WITH AUTOMATED DIFF    Collection Time: 01/23/17 12:42 AM   Result Value Ref Range    WBC 11.8 4.6 - 13.2 K/uL    RBC 3.28 (L) 4.20 - 5.30 M/uL    HGB 8.8 (L) 12.0 - 16.0 g/dL    HCT 26.9 (L) 35.0 - 45.0 %    MCV 82.0 74.0 - 97.0 FL    MCH 26.8 24.0 - 34.0 PG    MCHC 32.7 31.0 - 37.0 g/dL    RDW 13.8 11.6 - 14.5 %    PLATELET 633 513 - 920 K/uL    MPV 9.3 9.2 - 11.8 FL    NEUTROPHILS 80 (H) 40 - 73 %    LYMPHOCYTES 9 (L) 21 - 52 %    MONOCYTES 11 (H) 3 - 10 %    EOSINOPHILS 0 0 - 5 %    BASOPHILS 0 0 - 2 %    ABS. NEUTROPHILS 9.4 (H) 1.8 - 8.0 K/UL    ABS. LYMPHOCYTES 1.1 0.9 - 3.6 K/UL    ABS. MONOCYTES 1.3 (H) 0.05 - 1.2 K/UL    ABS. EOSINOPHILS 0.0 0.0 - 0.4 K/UL    ABS.  BASOPHILS 0.0 0.0 - 0.1 K/UL    DF AUTOMATED     CARDIAC PANEL,(CK, CKMB & TROPONIN)    Collection Time: 01/23/17 12:42 AM   Result Value Ref Range    CK 41 26 - 192 U/L    CK - MB 1.3 0.5 - 3.6 ng/ml    CK-MB Index 3.2 0.0 - 4.0 %    Troponin-I, Qt. 0.08 (H) 0.0 - 0.045 NG/ML   PTT    Collection Time: 01/23/17 12:42 AM   Result Value Ref Range    aPTT 95.2 (H) 23.0 - 36.4 SEC   PROTHROMBIN TIME + INR    Collection Time: 01/23/17 12:42 AM   Result Value Ref Range    Prothrombin time 15.2 11.5 - 15.2 sec    INR 1.2 0.8 - 1.2     METABOLIC PANEL, BASIC    Collection Time: 01/23/17 12:42 AM   Result Value Ref Range    Sodium 137 136 - 145 mmol/L    Potassium 4.3 3.5 - 5.5 mmol/L    Chloride 100 100 - 108 mmol/L    CO2 32 21 - 32 mmol/L    Anion gap 5 3.0 - 18 mmol/L    Glucose 114 (H) 74 - 99 mg/dL    BUN 20 (H) 7.0 - 18 MG/DL    Creatinine 0.72 0.6 - 1.3 MG/DL    BUN/Creatinine ratio 28 (H) 12 - 20      GFR est AA >60 >60 ml/min/1.73m2    GFR est non-AA >60 >60 ml/min/1.73m2    Calcium 9.0 8.5 - 10.1 MG/DL   MAGNESIUM    Collection Time: 01/23/17 12:42 AM   Result Value Ref Range    Magnesium 2.2 1.8 - 2.4 mg/dL   GLUCOSE, POC    Collection Time: 01/23/17 12:52 AM   Result Value Ref Range    Glucose (POC) 119 (H) 70 - 110 mg/dL   PTT    Collection Time: 01/23/17  5:58 AM   Result Value Ref Range    aPTT 150.4 (HH) 23.0 - 36.4 SEC   GLUCOSE, POC    Collection Time: 01/23/17  6:31 AM   Result Value Ref Range    Glucose (POC) 103 70 - 110 mg/dL   PTT    Collection Time: 01/23/17  5:25 PM   Result Value Ref Range    aPTT 73.9 (H) 23.0 - 36.4 SEC   CARDIAC PANEL,(CK, CKMB & TROPONIN)    Collection Time: 01/23/17 10:40 PM   Result Value Ref Range    CK 29 26 - 192 U/L    CK - MB 1.0 0.5 - 3.6 ng/ml    CK-MB Index 3.4 0.0 - 4.0 %    Troponin-I, Qt. 0.02 0.0 - 0.045 NG/ML   GLUCOSE, POC    Collection Time: 01/23/17 11:19 PM   Result Value Ref Range    Glucose (POC) 163 (H) 70 - 110 mg/dL   PROTHROMBIN TIME + INR    Collection Time: 01/24/17  2:50 AM   Result Value Ref Range    Prothrombin time 14.9 11.5 - 15.2 sec    INR 1.2 0.8 - 1.2     CBC WITH AUTOMATED DIFF    Collection Time: 01/24/17  2:50 AM   Result Value Ref Range    WBC 11.7 4.6 - 13.2 K/uL    RBC 3.22 (L) 4.20 - 5.30 M/uL    HGB 8.6 (L) 12.0 - 16.0 g/dL    HCT 26.2 (L) 35.0 - 45.0 %    MCV 81.4 74.0 - 97.0 FL    MCH 26.7 24.0 - 34.0 PG    MCHC 32.8 31.0 - 37.0 g/dL    RDW 13.7 11.6 - 14.5 %    PLATELET 228 981 - 114 K/uL    MPV 9.2 9.2 - 11.8 FL    NEUTROPHILS 82 (H) 40 - 73 %    LYMPHOCYTES 10 (L) 21 - 52 %    MONOCYTES 8 3 - 10 %    EOSINOPHILS 0 0 - 5 %    BASOPHILS 0 0 - 2 %    ABS. NEUTROPHILS 9.7 (H) 1.8 - 8.0 K/UL    ABS. LYMPHOCYTES 1.1 0.9 - 3.6 K/UL    ABS. MONOCYTES 0.9 0.05 - 1.2 K/UL    ABS.  EOSINOPHILS 0.0 0.0 - 0.4 K/UL ABS. BASOPHILS 0.0 0.0 - 0.06 K/UL    DF AUTOMATED     CARDIAC PANEL,(CK, CKMB & TROPONIN)    Collection Time: 01/24/17  2:50 AM   Result Value Ref Range    CK 28 26 - 192 U/L    CK - MB 1.1 0.5 - 3.6 ng/ml    CK-MB Index 3.9 0.0 - 4.0 %    Troponin-I, Qt. 0.03 0.0 - 0.045 NG/ML   HEPATIC FUNCTION PANEL    Collection Time: 01/24/17  2:50 AM   Result Value Ref Range    Protein, total 6.0 (L) 6.4 - 8.2 g/dL    Albumin 2.1 (L) 3.4 - 5.0 g/dL    Globulin 3.9 2.0 - 4.0 g/dL    A-G Ratio 0.5 (L) 0.8 - 1.7      Bilirubin, total 0.1 (L) 0.2 - 1.0 MG/DL    Bilirubin, direct <0.1 0.0 - 0.2 MG/DL    Alk. phosphatase 53 45 - 117 U/L    AST 35 15 - 37 U/L    ALT 36 13 - 56 U/L   PTT    Collection Time: 01/24/17  2:50 AM   Result Value Ref Range    aPTT 74.5 (H) 23.0 - 04.5 SEC   METABOLIC PANEL, BASIC    Collection Time: 01/24/17  2:50 AM   Result Value Ref Range    Sodium 139 136 - 145 mmol/L    Potassium 4.2 3.5 - 5.5 mmol/L    Chloride 102 100 - 108 mmol/L    CO2 28 21 - 32 mmol/L    Anion gap 9 3.0 - 18 mmol/L    Glucose 151 (H) 74 - 99 mg/dL    BUN 22 (H) 7.0 - 18 MG/DL    Creatinine 0.84 0.6 - 1.3 MG/DL    BUN/Creatinine ratio 26 (H) 12 - 20      GFR est AA >60 >60 ml/min/1.73m2    GFR est non-AA >60 >60 ml/min/1.73m2    Calcium 8.8 8.5 - 10.1 MG/DL   GLUCOSE, POC    Collection Time: 01/24/17 11:35 AM   Result Value Ref Range    Glucose (POC) 150 (H) 70 - 110 mg/dL   PTT    Collection Time: 01/24/17  3:04 PM   Result Value Ref Range    aPTT 75.0 (H) 23.0 - 36.4 SEC   HGB & HCT    Collection Time: 01/24/17  3:04 PM   Result Value Ref Range    HGB 9.8 (L) 12.0 - 16.0 g/dL    HCT 29.9 (L) 35.0 - 45.0 %       No past medical history on file. No past surgical history on file. Social History     Social History    Marital status: UNKNOWN     Spouse name: N/A    Number of children: N/A    Years of education: N/A     Occupational History    Not on file.      Social History Main Topics    Smoking status: Not on file    Smokeless tobacco: Not on file    Alcohol use Not on file    Drug use: Not on file    Sexual activity: Not on file     Other Topics Concern    Not on file     Social History Narrative       No family history on file.     Allergies   Allergen Reactions    Morphine Nausea and Vomiting     Critical care time 35 minutes     Follow-up Disposition: Not on File    Jhony Lee MD

## 2017-01-24 NOTE — PROGRESS NOTES
GEN SURG   Patient comfortable except for neck pain  No L eft breast pain    Pathology POORLY DIFFERENTIATED INFILTRATING ADENOCA  Agree with Hospice  Stop follow-up- recall if needed

## 2017-01-24 NOTE — PROGRESS NOTES
[facility]  ICU Progress Note      Name: Vladimir Dowling   : 1940   MRN: [id number]   Date: 2017 7:47 AM     [x]I have reviewed the flowsheet and previous days notes. Events overnight reviewed and discussed with nursing staff. Vital signs and records reviewed. Subjective:    Pt was placed on BiPap for a short while last night while she slept. No other events throughout the night. Pt is awake, alert, cooperative and amiable; no acute distress. Currently on O2 via NC. Pt does appear a little SOB when talking, but denies any SOB/YESENIA, CP, N/V, ABD pain. Pt does admit that she still has not had a bowl movement since arriving to the ICU. Pain still well controlled on current PO pain meds. Pt was made DNR/ DNI yesterday after a lengthy family meeting to discuss her treatment options and medical management regarding her metastatic Breast CA. The pt had several questions this morning regarding the conversation we had yesterday about her progression of care and treatment options at this time. A recap of the family meeting was discussed at length, as well as discussing comfort care options again, where I answered any questions or concerns that she had to the best of my ability. At this point, while she is aware and acknowledges that moving in this direction is what will be best for her, she still has some reservations and hesitations about making any such decisions at this very moment. She also voiced some dissatisfaction about how she was treated while in the ED and has apparently spoke with a patient care advocate during her stay at the hospital. She has inquired about the status/outcome of that matter. Lynsey, the nurse manager was made aware and has arranged for a patient advocate to visit the pt and address any concerns. Per Palliative Care, while the pt is not yet to the point of deciding on comfort care, she will not have any escalation of care going forward. ROS:A comprehensive review of systems was negative. Medication Review:  · Pressors - N/A  · Sedation - N/A  · Antibiotics - N/A  · Pain - PRN [Percocet 5/325mg; Dilaudid .5mg]  · GI/ DVT - Pepcid 20mg PO/ Heparin Drip\  · Others (other gtts)    Vital Signs:    Visit Vitals    /82 (BP Patient Position: At rest)    Pulse 90    Temp 98.5 °F (36.9 °C)    Resp 18    Ht 5' 3\" (1.6 m)    Wt 86.9 kg (191 lb 9.3 oz)    SpO2 95%    Breastfeeding No    BMI 33.94 kg/m2       O2 Device: Nasal cannula   O2 Flow Rate (L/min): 2 l/min   Temp (24hrs), Av.3 °F (36.8 °C), Min:98 °F (36.7 °C), Max:98.5 °F (36.9 °C)       Intake/Output:   Last shift:       07 - 1900  In: 3.2 [I.V.:3.2]  Out: -   Last 3 shifts: 1901 -  0700  In: 3713 [P.O.:360; I.V.:3859]  Out: -     Intake/Output Summary (Last 24 hours) at 17 1100  Last data filed at 17 0800   Gross per 24 hour   Intake           662.59 ml   Output                0 ml   Net           662.59 ml     Physical Exam:     General: Awake, alert, cooperative; no acute distress. HEENT: Anicteric sclerae; pink palpebral conjunctivae; mucosa moist  Resp: Symmetrical chest expansion, no accessory muscle use; Diminished expiratory phase throughout; no rales/ wheezing/ rhonchi noted  CV: S1, S2 present; RRR  GI: Abdomen soft, no TTP (+) active bowel sounds x4. Extremities: +2 pulses on all extremities; edema +2 in BLE up to mid shin; edema +2 BUE up to the elbow. No cyanosis/ clubbing noted  Skin: Warm; no rashes/ lesions noted. L breast Bx site bandaged; c/d/i.    Neurologic: Non-focal  Devices:  No NGT/OGT, Central line/ PICC, ETT/tracheostomy, chest tube  Lines: Peripheral IV x2 - R hand; L wrist    DATA:     Current Facility-Administered Medications   Medication Dose Route Frequency    insulin lispro (HUMALOG) injection   SubCUTAneous AC&HS    dextrose (D50W) injection syrg 12.5-25 g  25-50 mL IntraVENous PRN    glucose chewable tablet 16 g  4 Tab Oral PRN    glucagon (GLUCAGEN) injection 1 mg  1 mg IntraMUSCular PRN    dextrose (D50W) injection syrg 12.5-25 g  25-50 mL IntraVENous PRN    sodium chloride (NS) flush 5-10 mL  5-10 mL IntraVENous Q8H    sodium chloride (NS) flush 5-10 mL  5-10 mL IntraVENous PRN    famotidine (PEPCID) tablet 20 mg  20 mg Oral ON CALL TO OR    celecoxib (CELEBREX) capsule 400 mg  400 mg Oral ON CALL TO OR    pregabalin (LYRICA) capsule 50 mg  50 mg Oral ON CALL TO OR    heparin 25,000 units in D5W 250 ml infusion  18-36 Units/kg/hr IntraVENous TITRATE    0.9% sodium chloride infusion  75 mL/hr IntraVENous CONTINUOUS    albuterol-ipratropium (DUO-NEB) 2.5 MG-0.5 MG/3 ML  3 mL Nebulization Q4H RT    LORazepam (ATIVAN) injection 1 mg  1 mg IntraVENous Q6H PRN    0.9% sodium chloride infusion 250 mL  250 mL IntraVENous PRN    dexamethasone (DECADRON) 4 mg/mL injection 4 mg  4 mg IntraVENous Q6H    0.9% sodium chloride infusion 250 mL  250 mL IntraVENous PRN    calcium acetate-aluminum sulf (DOMEBORO) 5 Packet, sodium chloride irrigation 0.9 % 500 mL   Topical BID    ondansetron (ZOFRAN) injection 4 mg  4 mg IntraVENous Q4H PRN    influenza vaccine 2016-17 (36mos+)(PF) (FLUZONE/FLUARIX/FLULAVAL QUAD) injection 0.5 mL  0.5 mL IntraMUSCular PRIOR TO DISCHARGE    HYDROmorphone (DILAUDID) syringe 0.5 mg  0.5 mg IntraVENous Q4H PRN    oxyCODONE-acetaminophen (PERCOCET) 5-325 mg per tablet 1 Tab  1 Tab Oral Q4H PRN    acetaminophen (TYLENOL) tablet 500 mg  500 mg Oral Q6H PRN    famotidine (PEPCID) tablet 20 mg  20 mg Oral BID    docusate sodium (COLACE) capsule 100 mg  100 mg Oral BID    multivitamin, tx-iron-ca-min (THERA-M w/ IRON) tablet 1 Tab  1 Tab Oral DAILY         Labs: Results:       Chemistry Recent Labs      01/24/17   0250  01/23/17   0042  01/22/17   0256   GLU  151*  114*  142*   NA  139  137  138   K  4.2  4.3  4.1   CL  102  100  101   CO2  28  32  30   BUN  22*  20*  21*   CREA 0. 84  0.72  0.80   CA  8.8  9.0  9.4   AGAP  9  5  7   BUCR  26*  28*  26*   AP  53   --    --    TP  6.0*   --    --    ALB  2.1*   --    --    GLOB  3.9   --    --    AGRAT  0.5*   --    --       CBC w/Diff Recent Labs      01/24/17   0250  01/23/17   0042  01/22/17   1945  01/22/17   0256   WBC  11.7  11.8   --   12.7   RBC  3.22*  3.28*   --   3.31*   HGB  8.6*  8.8*  10.2*  8.9*   HCT  26.2*  26.9*  31.1*  27.2*   PLT  388  410   --   404   GRANS  82*  80*   --   82*   LYMPH  10*  9*   --   6*   EOS  0  0   --   0      Coagulation Recent Labs      01/24/17   0250  01/23/17   1725   01/23/17   0042   PTP  14.9   --    --   15.2   INR  1.2   --    --   1.2   APTT  74.5*  73.9*   < >  95.2*    < > = values in this interval not displayed. Liver Enzymes Recent Labs      01/24/17   0250   TP  6.0*   ALB  2.1*   AP  53   SGOT  35      ABG No results found for: PH, PHI, PCO2, PCO2I, PO2, PO2I, HCO3, HCO3I, FIO2, FIO2I   Microbiology No results for input(s): CULT in the last 72 hours. Telemetry: [x]Sinus []A-flutter []Paced    []A-fib []Multiple PVCs                    Imaging:  Duplex UE (01/23/17): INTERPRETATION/FINDINGS:  Duplex images were obtained using 2-D gray scale, color flow, and  spectral Doppler analysis. 1. No evidence of deep venous thrombosis detected in the veins  visualized. 2. Deep veins visualized include the internal jugular, subclavian,  axillary and brachial veins. 3. No evidence of superficial thrombosis detected. 4. Superficial veins visualized include the basilic (upper arm) and  cephalic (upper arm) veins. 5. No evidence of deep vein thrombosis in the contralateral subclavian  or internal jugular veins.       IMPRESSION:   · Acute Respiratory failure requiring noninvasive ventilation support - Improving  · Breast CA w/ METS to multiple organs, including Lung, Bone, Spine, L Adrenal gland.   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 Pt is now DNR/DNI. · Case Management to work on placement for Hospice care. · Resp - O2 sat goal >92%. Continue to titrate supp. O2 PRN. Monitor for respiratory distress. Continue Duo-nebs PRN. · ID -  D/C ABX yesterday. No further tx needed at this time. · CVS - Duplex UE yesterday was negative. Echo is cancelled. · Heme/onc -  Hem/Onc on board. Daily CBC,monitor coags & H&H, pending transition to comfort care measures. Continue Heparin drip for bilat PE's at thsi time. Continue IV decadron started to aid with inflammatory process from lytic lesion/ mets to bone. · Metabolic - Daily BMP, pending comfort care measures. Replace lyts per protocol. IVF NS @ 75mL/hr. · Renal - Monitor Cr. No further issues at this time. · Endocrine - BS Goal <180. Add Insulin PRN & Acu-chek AC/HS - pt is becoming hyperglycemic, likely 2/2 to IV steroid. · Neuro/ Pain/ Sedation - Continue PRN Pain meds. Spine surgery cancelled yesterday d/t PE.   · GI - Continue Pepcid PO. No further issues at this time.    · Prophylaxis - DVT - Heparin drip/ GI - Pepid 20mg PO  · Discussed in interdisciplinary rounds          The patient is: [x] acutely ill Risk of deterioration: [] moderate    [] critically ill  [x] high     []See my orders for details    My assessment/plan was discussed with:  []nursing []PT/OT    []respiratory therapy [x]Dr. Devorah Dial   []family []       Ray Balbuena PA-C

## 2017-01-24 NOTE — FAMILY MEETING
This writer met with this pt and her relatives at bedside on ICU;  Present Brody Boyd cousin; Mina James- [phone] and Rosalba Collazo- [phone]. Pt signed [facility] for family to locate an Assisting living facility for placement when she is ready for her discharge. The plan is for this pt to be placed at [facility] or [facility]. Pt who has a consult for Hospice, states she does not want to discuss hospice or participate at this time, she feels it is too overwhelming to think about. Pt asked that family review and discuss necessary information to help decide on the plan of placement. Family is planning to review properties in the a.m. And report back to patient and CM the decision, of choice. Family was informed that pt maybe moved and whatever the chosen facility is the CM of that floor could assist them with what is needed for this placement to go forth. Palliative is supportive of this pt and the need for support around the information regarding her health.

## 2017-01-24 NOTE — CONSULTS
Follow up with patient, cousin and 2nd cousin at bedside. Patient continues to struggle with her situation but very appreciative of repeated conversations of her medical condition. Patient agrees to no escalation of care at this time - specifically discussed pressors risk / benefit / burden with recommendation to no escalate if showing decline in blood pressure. Case management and patient advocate also meeting with patient today. Full note to follow.

## 2017-01-24 NOTE — DIABETES MGMT
Glycemic Control: blood glucose ranging from 103 to 163 mg/dL. No history of diabetes, blood glucose likely elevated due to steroids. Continue correctional Lispro insulin coverage as needed.      Najma Ramirez RD, CDE

## 2017-01-24 NOTE — ROUTINE PROCESS
Bedside, Verbal, Recorded and Written shift change report given to Lester Jorgensen (oncoming nurse) by Yusuf Venegas RN (offgoing nurse). Report included the following information SBAR, Kardex, ED Summary, Procedure Summary, Intake/Output, MAR, Accordion, Recent Results, Med Rec Status and Alarm Parameters .

## 2017-01-25 NOTE — PROGRESS NOTES
Bedside shift change report given to Ian Chadwick RN (oncoming nurse) by Gregorio Jensen RN (offgoing nurse). Report included the following information SBAR, Kardex, progress notes, Intake/Output and MAR.

## 2017-01-25 NOTE — ROUTINE PROCESS
Received pt in a semi fowlers position. Pt refusing to take any meds or any 02. Pt states that she is comfortable where she is.  0900-Dr. Jackson paged regarding pt refusing txs. 1030-Dr. Jackson repaged.

## 2017-01-25 NOTE — PROGRESS NOTES
SUBJECTIVE:    Patient sitting in bed in NAD, awake, follows commands. OBJECTIVE:    Visit Vitals    /68 (BP Patient Position: At rest)    Pulse 100    Temp 98.7 °F (37.1 °C)    Resp 20    Ht 5' 3\" (1.6 m)    Wt 86.9 kg (191 lb 9.3 oz)    SpO2 98%    Breastfeeding No    BMI 33.94 kg/m2     General:  Awake, alert  Cardiovascular:  S1S2+, RRR  Pulmonary:  Coarse BS b/l  GI:  Soft, BS+, NT, ND  Extremities:  No edema         ASSESSMENT:    1. Osteolytic lesion C2 to C4 with pathologic fracture C3 with moderate cord compression C2-C4. 2. Metastatic left Breast mass including bone, lungs, adrenal.  3. Acute recurrent PE  4. Leg pain, no DVT - BETTER  5. Elevated AST, improving. 6. Low grade fever, resolved  7. Moderate protein malnutrition   8. Leukocytosis sec to steroid, better  9. Acute respiratory distress due to PE    PLAN:    S/p antibiotics  Biopsy- adenocarcinoma  Spine surgery, general surgery, and oncology input noted  On heparin drip, O2  Palliative care input noted, DNR, no escalation of care.   Continue to check labs while on heparin drip  D/w patient    CMP:   Lab Results   Component Value Date/Time     01/24/2017 02:50 AM    K 4.2 01/24/2017 02:50 AM     01/24/2017 02:50 AM    CO2 28 01/24/2017 02:50 AM    AGAP 9 01/24/2017 02:50 AM     (H) 01/24/2017 02:50 AM    BUN 22 (H) 01/24/2017 02:50 AM    CREA 0.84 01/24/2017 02:50 AM    GFRAA >60 01/24/2017 02:50 AM    GFRNA >60 01/24/2017 02:50 AM    CA 8.8 01/24/2017 02:50 AM    ALB 2.1 (L) 01/24/2017 02:50 AM    TP 6.0 (L) 01/24/2017 02:50 AM    GLOB 3.9 01/24/2017 02:50 AM    AGRAT 0.5 (L) 01/24/2017 02:50 AM    SGOT 35 01/24/2017 02:50 AM    ALT 36 01/24/2017 02:50 AM     CBC:   Lab Results   Component Value Date/Time    WBC 11.7 01/24/2017 02:50 AM    HGB 9.8 (L) 01/24/2017 03:04 PM    HCT 29.9 (L) 01/24/2017 03:04 PM     01/24/2017 02:50 AM     Brittany Solis MD

## 2017-01-25 NOTE — PROGRESS NOTES
Bedside and verbal report given to Bebe Hinds RN (on coming nurse) by Janett Skinner LPN (off going nurse). Report included SBAR, Kardex, MAR, and recent results.

## 2017-01-25 NOTE — PROGRESS NOTES
SUBJECTIVE:    Patient sitting in bed in NAD, awake, follows commands. OBJECTIVE:    Visit Vitals    /71 (BP 1 Location: Left arm)    Pulse 96    Temp 98.3 °F (36.8 °C)    Resp 18    Ht 5' 3\" (1.6 m)    Wt 86.9 kg (191 lb 9.3 oz)    SpO2 97%    Breastfeeding No    BMI 33.94 kg/m2     General:  Awake, alert  Cardiovascular:  S1S2+, RRR  Pulmonary:  Coarse BS b/l  GI:  Soft, BS+, NT, ND  Extremities:  No edema         ASSESSMENT:    1. Osteolytic lesion C2 to C4 with pathologic fracture C3 with moderate cord compression C2-C4. 2. Metastatic left Breast mass including bone, lungs, adrenal.  3. Acute recurrent PE  4. Leg pain, no DVT - BETTER  5. Elevated AST, improving. 6. Low grade fever, resolved  7. Moderate protein malnutrition   8. Leukocytosis sec to steroid, better  9. Acute respiratory distress due to PE    PLAN:    S/p antibiotics  Biopsy- adenocarcinoma  Spine surgery, general surgery, and oncology input noted  On heparin drip, O2  Palliative care input noted, DNR, no escalation of care.   Continue to check labs while on heparin drip  D/w patient    CMP:   No results found for: NA, K, CL, CO2, AGAP, GLU, BUN, CREA, GFRAA, GFRNA, CA, MG, PHOS, ALB, TBIL, TP, ALB, GLOB, AGRAT, SGOT, ALT, GPT  CBC:   Lab Results   Component Value Date/Time    HGB 9.9 (L) 01/25/2017 01:48 PM    HCT 30.2 (L) 01/25/2017 01:48 PM     Montse Patino MD

## 2017-01-25 NOTE — PROGRESS NOTES
Follow up on patient that is on our service. Resting in bed quietly with eyes closed. Per nurse, patient just received Ativan for anxiety. Patient reluctant but accepting per nurse. Will not awaken patient due to history of anxiety. Will continue to follow.

## 2017-01-25 NOTE — PROGRESS NOTES
8595 Patient refused to restart her heparin drip, fluids and labs to be taken. Education provided regarding non compliance, verbalized understanding but continue to refused. Pt stated \"I don't want to be bother, leave me alone for now. \" Other staff nurse called to encourage patient but continue to refuse. 5970 Encourage pt to have heparin drip, and fluids started, and labs to be taken but continue to refuse. Education provided regarding non compliance. Pt verbalized understanding. Md notified, per Dr. Yusuf Melara, try again after 3 hours, if pt still refused, notify regular doctor in AM.     3588  Oncoming Am nurse notified and aware about following up this AM with the medical doctors regarding patient has been refusing medication and labs.

## 2017-01-25 NOTE — PROGRESS NOTES
Palliative Medicine  [place]: [phone])  [facility]: [phone] ([phone])      Resuscitation Status: DNR   Durable DNR addressed? [x] Yes   [] No    [] Not Applicable    Advance Care Planning 1/18/2017   Patient's Healthcare Decision Maker is: Legal Next of Kin   Primary Decision Maker Name Howie Velasco (cousin)   Primary Decision Maker Phone Number [phone] or [phone]   Primary Decision Maker Relationship to Patient Other relative   Secondary Decision Maker Name Eleuterio Broussard (cousin)   Secondary Decision Maker Phone Number [phone] or [phone]   Secondary Decision Maker Relationship to Patient Other relative   Confirm Advance Directive None   Patient Would Like to Complete Advance Directive Yes   Does the patient have other document types Do Not Resuscitate     Palliative SW followed up with patient on service. RN, Joelle Barclay was leaving room when SW arrived. She shared patient wasn't doing well emotionally and was upset. Entered room where patient was sitting up in bed, gown half off on shoulders, not wearing nasula cannula but was breathing slightly heavy. Her gown was wet along with sheets. She shared \"had a bad night\" and she wasn't doing well. Asked if she would share what was bad or upsetting her. She shared her RN and CNA assigned last night were 'yelling' at her for getting up from side commode on her own and for making a mess. She appeared exteremly anxious and overwhelmed. She became upset with SW when making suggestions to help with relaxation. Patient couldn't identify any coping skills to assist with her agitation/anxiety. Throughout conversation patient became easily agitated with SW and stated \"your too calm for me\". Encouraged her to ask for anxiety medication Ativan. Patient had earlier refused CNAs assistance with hygiene. SW encouraged patient to get cleaned up for comfort reasons. She finally agreed to getting hygiene assistance from CNA.  Notified CNA and RN who came to assist patient. Patient asked RN for pain medications. Close friend arrived to visit, hopefully this will provided some release. SW called two cousins Chrissy Fisher and Alhaji Ramsey to give update on patients current emotional/physical status. No answer from either, left vm for call back. Family had planned to visit facilities this AM for possible placement. Palliative SW will follow up later this afternoon to check in on patient. Thank you for the opportunity to assist in the care of Ms. Mau Stephy.   Ml Ray, FRANKIE  Palliative Medicine

## 2017-01-26 NOTE — PROGRESS NOTES
SUBJECTIVE:    comfortable    OBJECTIVE:    Visit Vitals    /67 (BP 1 Location: Left arm, BP Patient Position: At rest)    Pulse 89    Temp 97.6 °F (36.4 °C)    Resp 18    Ht 5' 3\" (1.6 m)    Wt 86.9 kg (191 lb 9.3 oz)    SpO2 97%    Breastfeeding No    BMI 33.94 kg/m2     General:  Awake, alert  Cardiovascular:  S1S2+, RRR  Pulmonary:  Coarse BS b/l  GI:  Soft, BS+, NT, ND  Extremities:  No edema         ASSESSMENT:    1. Osteolytic lesion C2 to C4 with pathologic fracture C3 with moderate cord compression C2-C4. 2. Metastatic left Breast mass including bone, lungs, adrenal.  3. Acute recurrent PE  4. Leg pain, no DVT - BETTER  5. Elevated AST, improving. 6. Low grade fever, resolved  7. Moderate protein malnutrition   8. Leukocytosis sec to steroid, better  9. Acute respiratory distress due to PE    PLAN:    S/p antibiotics  Biopsy- adenocarcinoma  Spine surgery, general surgery, and oncology input noted  On heparin drip, O2  Palliative care input noted, DNR, no escalation of care.   Continue to check labs while on heparin drip  D/w patient    CMP:   Lab Results   Component Value Date/Time     01/26/2017 03:42 AM    K 4.1 01/26/2017 03:42 AM     01/26/2017 03:42 AM    CO2 31 01/26/2017 03:42 AM    AGAP 8 01/26/2017 03:42 AM     (H) 01/26/2017 03:42 AM    BUN 23 (H) 01/26/2017 03:42 AM    CREA 0.73 01/26/2017 03:42 AM    GFRAA >60 01/26/2017 03:42 AM    GFRNA >60 01/26/2017 03:42 AM    CA 8.6 01/26/2017 03:42 AM     CBC:   Lab Results   Component Value Date/Time    WBC 11.2 01/26/2017 03:42 AM    HGB 10.3 (L) 01/26/2017 09:16 AM    HCT 31.9 (L) 01/26/2017 09:16 AM     01/26/2017 03:42 AM     Farhana Meza MD

## 2017-01-26 NOTE — PROGRESS NOTES
NUTRITION    Nutrition Consult: General Nutrition Management & Supplements,      RECOMMENDATIONS / PLAN:     - Continue with current nutrition interventions  - Continue RD inpatient monitoring and evaluation     NUTRITION INTERVENTIONS & DIAGNOSIS:     [x] Meals/Snacks: modified diet  [x] Medical food supplementation:  Ensure Enlive once daily   [x] Vitamin and mineral supplementation: thera-m with iron     Nutrition Diagnosis:  Inadequate energy intake related to decreased appetite as evidenced by poor po intake x 1 week PTA    ASSESSMENT:     Subjective:  Patient reported appetite and po intake are improving. Has fair/good meal intake.  Consuming supplements  Current Appetite:   [x] Good     [x] Fair     [] Poor     [] Other:  Appetite/meal intake prior to admission:   [] Good     [] Fair     [x] Poor (x 1 week PTA)     [] Other:    Diet: DIET NUTRITIONAL SUPPLEMENTS  DIET REGULAR Gluten Free     Average po intake adequate to meet patients estimated nutritional needs:   [x] Yes (meals and supplements)     [] No   [] Unable to determine at this time  Current Meal Intake:   Patient Vitals for the past 100 hrs:   % Diet Eaten   01/25/17 1521 25 %   01/25/17 1108 75 %   01/23/17 0956 25 %      Food Allergies:  None known (possible intolerance to gluten)  Feeding Limitations:  [] Swallowing difficulty    [] Chewing difficulty    [x] Other: pt reported sometimes has soreness on side of neck while eating, but stated is tolerating meals    BM:  1/25  Skin Integrity: abscess on left breast; redness to the sacrum  Edema:  None   Pertinent Medications: Reviewed     Labs:  Recent Labs      01/26/17   0342  01/24/17   0250   NA  139  139   K  4.1  4.2   CL  100  102   CO2  31  28   GLU  144*  151*   BUN  23*  22*   CREA  0.73  0.84   CA  8.6  8.8   ALB   --   2.1*   SGOT   --   35   ALT   --   36       Intake/Output Summary (Last 24 hours) at 01/26/17 1510  Last data filed at 01/26/17 1148   Gross per 24 hour   Intake 340 ml   Output             1100 ml   Net             -760 ml       Anthropometrics:  Ht Readings from Last 1 Encounters:   01/17/17 5' 3\" (1.6 m)     Last 3 Recorded Weights in this Encounter    01/17/17 1713 01/22/17 2000   Weight: 90.7 kg (200 lb) 86.9 kg (191 lb 9.3 oz)     Body mass index is 33.94 kg/(m^2). Weight History:  Patient stated usual body weight is 190 lbs    Weight Metrics 1/22/2017 1/16/2017   Weight 191 lb 9.3 oz 200 lb   BMI 33.94 kg/m2 34.33 kg/m2        Admitting Diagnosis: Cervical spine instability [M53.2X2]  Cervical spinal cord compression (HCC) [G95.20]  Cancer, metastatic to bone (HCC) [C79.51]  No past medical history on file. Education Needs:        [x] None identified  [] Identified - Not appropriate at this time  []  Identified and addressed - refer to education log  Learning Limitations:   [x] None identified  [] Identified    Cultural, Caodaism & ethnic food preferences:  [x] None identified    [] Identified and addressed     ESTIMATED NUTRITION NEEDS:     Calories: 0933-9561 kcal (MSJx1.2-1.3) based on  [x] Actual BW:  9 kg    [] IBW:   Protein: 73-91 gm (0.8-1 gm/kg) based on  [x] Actual BW:  91 kg    [] IBW:   Fluid: 1 mL/kcal     MONITORING & EVALUATION:     Nutrition Goal(s):  1. Po intake of meals will meet >75% of patient estimated nutritional needs within the next 5-7 days.   Outcome:  [] Met/Ongoing    [x]  Not Met/ progressing    [] New/Initial Goal     Monitoring:   [x] Monitor meal intake    [] Monitor diet tolerance     [x] Monitor supplement intake    Previous Recommendations (for follow-up assessments only):     [x]   Implemented       []   Not Implemented (RD to address)     [] No Recommendation Made     Discharge Planning:  Regular diet; gluten free per patient preference/ due to possible intolerance  [x] Participated in care planning, discharge planning, & interdisciplinary rounds as appropriate      Katie Gee, [address] [place] [address]   Pager: [phone]

## 2017-01-26 NOTE — PROGRESS NOTES
Assessment completed. Patient alert and oriented x 4. Patient resting with eyes open. Patient denies SOB and chest pain. No respiratory distress noted. Bowel sounds present, but hypoactive in all four quadrants. Mass to left neck, wound to left breast. Dressing changed to left breast some draining present. New dressing is clean, dry and intact. Call bell and phone within reach.   No further concerns at this time

## 2017-01-26 NOTE — PROGRESS NOTES
Palliative Medicine  [place]: [phone]  [facility]: [phone])      Resuscitation Status: DNR   Durable DNR addressed? [x] Yes   [] No    [] Not Applicable    Advance Care Planning 1/18/2017   Patient's Healthcare Decision Maker is: Legal Next of Kin   Primary Decision Maker Name Talon Bell (cousin)   Primary Decision Maker Phone Number [phone] or [phone]   Primary Decision Maker Relationship to Patient Other relative   Secondary Decision Maker Name Kevin Stoll (cousin)   Secondary Decision Maker Phone Number [phone] or [phone]   Secondary Decision Maker Relationship to Patient Other relative   Confirm Advance Directive None   Patient Would Like to Complete Advance Directive Yes   Does the patient have other document types Do Not Resuscitate     Palliative follow up with patient. She appeared at ease with little anxiety. She reported feeling more relaxed after being able to sleep last night. Her family (cousins) were at bedside visiting, their presences was providing her comfort. We dicussed the use of Ativan at bedtime or routine. She was willing to accept anxiety medication to aid with sleep. Encouraged her to ask for anxiety medication during day if needed. Palliative NP, Kennedy Nguyen re-discussed comfort measures only. Patient wants to still think about transitioning to complete comfort until tomorrow. Plan is still for patient to be discharge to facility, family is assisting with identifying facility. Thank you for the opportunity to assist in the care of Ms. Rob Rudolph.   Ivonne Birmingham, MSW  Palliative Medicine

## 2017-01-26 NOTE — PROGRESS NOTES
ADULT PROTOCOL: JET AEROSOL ASSESSMENT    Patient  Karen Kay     68 y.o.   female     1/25/2017  8:37 PM    Breath Sounds Pre Procedure:  Breath Sounds Bilateral: Clear, Diminished, Upper                                            Breath Sounds Post Procedure: Breath Sounds Bilateral: Clear, Diminished, Upper                                               Breathing pattern: Pre procedure  Breathing Pattern: Regular          Post procedure  Breathing Pattern: Regular    Cough: Pre procedure  Cough: Non-productive               Post procedure Cough: Non-productive    Heart Rate: Pre procedure Pulse: 88           Post procedure Pulse: 88    Resp Rate: Pre procedure  Respirations: 24           Post procedure          Nebulizer Therapy: Current medications Aerosolized Medications: DuoNeb       Problem List:   Patient Active Problem List   Diagnosis Code    Neck mass R22.1    Malignant neoplasm of upper-outer quadrant of left female breast ([facility] [address] C50.412    Bone metastasis (HCC) C79.51    Spinal cord compression due to malignant neoplasm metastatic to spine (HCC) G95.20, C79.51    Malignant neoplasm metastatic to right lung (HCC) C78.01    Weakness of both arms M62.81    Debility R53.81    Pulmonary embolism (HCC) I26.99       Patient alert and cooperative to use MDI: NO    Home Respiratory Therapy Regimen/Frequency:  NO  Medication   Device  Frequency     SEVERITY INDEX:    ITEM 0 1 2 3 4 Score   Respiratory Pattern and or Rate Regular  10-19 Regular  20-24   24-30    30-34 Severe SOB or   Greater than 35 1   Breath Sounds Clear Occasional Wheeze Mild Wheezing Moderate Wheezing  wheezing/Absent breath sounds 0   Shortness of Breath None Dyspnea on Exertion Dyspnea at Rest Moderate Shortness of Breath at Rest Severe Shortness of Breath - Limited Speech 1       Total Score:  2    * Scoring Guidelines  0-4 pts:  PRN-BID   5-7 pts:  BID, TID, QID  8-9 pts:  TID, QID, Q6  10-12 pts:  Q4-Q6  * - Guidelines used with clinical judgement. PRN Treatments can be ordered to supplement scheduled treatments. Regardless of score, frequency should not be less than normal home regimen. Recommended Order/Frequency:  Q4 PRN    Comments:  Wheeze free. No Hx asthma. Change explained to patient and she is fine with PRN schedule.           Respiratory Therapist: Agnieszka Gonzalez, RT

## 2017-01-26 NOTE — CONSULTS
Re-discussed comfort measures only with patient - family present at bedside. Patient continues to state she is thinking about her goals and wishes to think about comfort measures only \"until tomorrow\". Discussed big picture situation and truly that aggressive measures with not be able to change situation. Family is currently looking at facilities for patient as well. Full note to follow.

## 2017-01-26 NOTE — ACP (ADVANCE CARE PLANNING)
Palliative Medicine  [place]: [phone])  [facility]: [phone])      Resuscitation Status: DNR   Durable DNR addressed? [x] Yes   [] No    [] Not Applicable    Advance Care Planning 1/18/2017   Patient's Healthcare Decision Maker is: Named in scanned ACP document   Primary Decision Maker Name Danny Maza   Primary Decision Maker Phone Number [phone] or [phone]    Primary Decision Maker Relationship to Patient Other relative   Secondary Decision Maker Name Howie Velasco   Secondary Decision Maker Phone Number [phone] or [phone]   Secondary Decision Maker Relationship to Patient Other relative   Confirm Advance Directive Yes, on file   Patient Would Like to Complete Advance Directive Yes   Does the patient have other document types Do Not Resuscitate     Patient completed AMD to assign 2nd cousin Danny Maza as primary MPOA and 1st cousin Howie Velasco 2nd MPOA. Patient noted healthcare wish for comfort and natural death; no life prolonging measures. She also wishes to be an organ donor. AMD was copied for patient and family. Placed on chart for scanning.

## 2017-01-26 NOTE — PROGRESS NOTES
Called lab for aPTT results concerning pt's heparin drip. Results still pending. 1126- aPTT 76.2 (therapeutic) no change in rate.

## 2017-01-26 NOTE — PROGRESS NOTES
Mobility Intervention:       [] Pt dangled at edge of bed    [x] Pt assisted OOB to bedside commode    [] Pt assisted OOB to chair    [] Pt ambulated to bathroom    [] Patient was ambulated in room/hallway    Assistive Device Utilized (check all that apply):       [x] Rolling walker   [] Crutches   [] Straight Cane   [] Knee immobilizer   [] IV pole    After Mobilization:     [] Patient left in no apparent distress sitting up in chair  [x] Patient left in no apparent distress in bed  [] Call bell left within reach  Assistive Device:        [] RN notified  [] Caregiver present  [] Bed alarm activated    Comments:

## 2017-01-26 NOTE — PROGRESS NOTES
Bedside and Verbal shift change report given to Lizzie Roth RN(oncoming nurse) by Jannette Chaidez (offgoing nurse). Report included the following information SBAR, Kardex, MAR and Recent Results. SITUATION:    Code Status: DNR   Reason for Admission: Cervical spine instability [M53.2X2]   Cervical spinal cord compression (HCC) [G95.20]   Cancer, metastatic to bone [facility]) [C79.51]    [facility] day: 9   Problem List:       Hospital Problems  Date Reviewed: 1/19/2017          Codes Class Noted POA    Debility ICD-10-CM: R53.81  ICD-9-CM: 799.3  1/23/2017 Unknown        Pulmonary embolism ([facility] [address] ICD-10-CM: I26.99  ICD-9-CM: 415.19  1/23/2017 Unknown        Malignant neoplasm of upper-outer quadrant of left female breast ([facility] [address] ICD-10-CM: C50.412  ICD-9-CM: 174.4  1/19/2017 Yes        Bone metastasis ([facility] [address] ICD-10-CM: C79.51  ICD-9-CM: 198.5  1/19/2017 Yes        Spinal cord compression due to malignant neoplasm metastatic to spine [facility]) ICD-10-CM: G95.20, C79.51  ICD-9-CM: 336.9, 198.5  1/19/2017 Yes        Malignant neoplasm metastatic to right lung [facility]) ICD-10-CM: C78.01  ICD-9-CM: 197.0  1/19/2017 Yes        Weakness of both arms ICD-10-CM: M62.81  ICD-9-CM: 729.89  1/19/2017 Yes        Neck mass ICD-10-CM: R22.1  ICD-9-CM: 784.2  1/17/2017 Yes              BACKGROUND:    Past Medical History: No past medical history on file. Patient taking anticoagulants yes     ASSESSMENT:    Changes in Assessment Throughout Shift: none     Patient has Central Line: yes Reasons if yes: .  Patient has Negro Cath: no Reasons if yes: .  Last Vitals:     Vitals:    01/25/17 1949 01/26/17 0400 01/26/17 0824 01/26/17 1520   BP: 129/69 119/64 137/67 139/70   Pulse: 89 84 89 98   Resp: 20 17 18 20   Temp: 98.3 °F (36.8 °C) 98.3 °F (36.8 °C) 97.6 °F (36.4 °C) 98 °F (36.7 °C)   SpO2: 96% 94% 97% 95%   Weight:       Height:            IV and DRAINS (will only show if present)   [REMOVED] Peripheral IV 01/19/17 Left; [address] Arm-Site Assessment: Clean, dry, & intact  [REMOVED] Peripheral IV 01/19/17 Left Hand-Site Assessment: Clean, dry, & intact  [REMOVED] Peripheral IV 01/19/17 Right Arm-Site Assessment: Clean, dry, & intact  [REMOVED] Peripheral IV 01/20/17 Left Forearm-Site Assessment: Clean, dry, & intact  [REMOVED] Peripheral IV 01/21/17 Left Wrist-Site Assessment: Clean, dry, & intact  Peripheral IV 01/22/17 Left Hand-Site Assessment: Clean, dry, & intact  Peripheral IV 01/22/17 Right Wrist-Site Assessment: Clean, dry, & intact  [REMOVED] Peripheral IV 01/17/17 Left Antecubital-Site Assessment: Clean, dry, & intact     WOUND (if present)   Wound Type:  Left breast   Dressing present Dressing Present : Yes   Wound Concerns/Notes:  none     PAIN    Pain Assessment    Pain Intensity 1: 0 (01/26/17 1613)    Pain Location 1: Neck    Pain Intervention(s) 1: Medication (see MAR)    Patient Stated Pain Goal: 0  o Interventions for Pain:  See MAR  o Intervention effective: yes  o Time of last intervention: See MAR   o Reassessment Completed: yes      Last 3 Weights:  Last 3 Recorded Weights in this Encounter    01/17/17 1713 01/22/17 2000   Weight: 90.7 kg (200 lb) 86.9 kg (191 lb 9.3 oz)     Weight change:      INTAKE/OUPUT    Current Shift: 01/26 0701 - 01/26 1900  In: 480 [P.O.:480]  Out: 1100 [Urine:1100]    Last three shifts: 01/24 1901 - 01/26 0700  In: 1520.9 [P.O.:700; I.V.:820.9]  Out: 2600 [Urine:2600]     LAB RESULTS     Recent Labs      01/26/17   0916  01/26/17   0342 01/25/17 2000 01/24/17   0250   WBC   --   11.2   --    --   11.7   HGB  10.3*  9.3*  9.7*   < >  8.6*   HCT  31.9*  28.3*  29.6*   < >  26.2*   PLT   --   361   --    --   388    < > = values in this interval not displayed. Recent Labs      01/26/17   0342  01/24/17   0250   NA  139  139   K  4.1  4.2   GLU  144*  151*   BUN  23*  22*   CREA  0.73  0.84   CA  8.6  8.8   INR  1.2  1.2       RECOMMENDATIONS AND DISCHARGE PLANNING     1.  Pending tests/procedures/ Plan of Care or Other Needs: none     2. Discharge plan for patient and Needs/Barriers: hospice    3. Estimated Discharge Date: TBD Posted on Whiteboard in Patients Room: yes      4. The patient's care plan was reviewed with the oncoming nurse. \"HEALS\" SAFETY CHECK      Fall Risk    Total Score: 3    Safety Measures: Safety Measures: Bed/Chair-Wheels locked, Bed in low position, Call light within reach    A safety check occurred in the patient's room between off going nurse and oncoming nurse listed above. The safety check included the below items  Area Items   H  High Alert Medications - Verify all high alert medication drips (heparin, PCA, etc.)   E  Equipment - Suction is set up for ALL patients (with madhav)  - Red plugs utilized for all equipment (IV pumps, etc.)  - WOWs wiped down at end of shift.  - Room stocked with oxygen, suction, and other unit-specific supplies   A  Alarms - Bed alarm is set for fall risk patients  - Ensure chair alarm is in place and activated if patient is up in a chair   L  Lines - Check IV for any infiltration  - Negro bag is empty if patient has a Negro   - Tubing and IV bags are labeled   S  Safety   - Room is clean, patient is clean, and equipment is clean. - Hallways are clear from equipment besides carts. - Fall bracelet on for fall risk patients  - Ensure room is clear and free of clutter  - Suction is set up for ALL patients (with madhav)  - Hallways are clear from equipment besides carts.    - Isolation precautions followed, supplies available outside room, sign posted     [facility]

## 2017-01-27 NOTE — CONSULTS
[facility]: [id number] ([id number])  [facility]: [address] [place] [address] Way: [phone]    Patient Name: Sergei Blankenship  YOB: 1940    Date of Initial Consult: 1/19/17  Reason for Consult: goals of care  Requesting Provider: Dr. Cindy Bullard  Primary Care Physician: Judd iRddle MD      SUMMARY:   Sergei Blankenship is a 68y.o. year old with a past history of PE on warfarin, who was admitted on 1/17/2017 from home with a diagnosis of neck pain and left breast mass. Current medical issues leading to Palliative Medicine involvement include: goals of care, Mass left breast and Destructive lesion C2/3/4/5 concerning for metastatic disease causing cord compression. She has left Breast mass and concern for mets to bone, lungs, adrenal.     PALLIATIVE DIAGNOSES:   1. Left Breast mass   2. Destructive lesion C2/3/4/5 concerning for metastatic disease causing cord compression. 3. Pulmonary embolism  4. Debility     PLAN:   1. Follow up with patient, family present at bedside. Discussed comfort measures option with explanation of if we found particular issues (ex: kidney decline) what would we do with that information given knowledge of extensive metastatic disease and that some treatments can add burden without benefit to comfort. Ms. Anh Colbert seems to comprehend and understand this but wishes to consider further. 2. Discussed anxiety management with recommendation for scheduled anxiety medication at this time. Patient anxiety worse at night and feels if she could sleep her anxiety during the day would be better. Ativan 1mg QHS ordered here. 3. Family assisting with looking at facilities (assisted livings) on patient behalf. Await placement. 4. Initial consult note routed to primary continuity provider  5.  Communicated plan of care with: Palliative IDT, patient, family at bedside (with patient permission), ICU team     GOALS OF CARE:     [====Goals of Care====]  GOALS OF CARE:  Patient / health care proxy stated goals: to process what is going on    TREATMENT PREFERENCES:   Code Status: DNR    Advance Care Planning:  Advance Care Planning 1/18/2017   Patient's Healthcare Decision Maker is: Named in scanned ACP document   Primary Decision Maker Name Nicky Soto   Primary Decision Maker Phone Number [phone] or [phone]    Primary Decision Maker Relationship to Patient Other relative   Secondary Decision Maker Name Ruy Modi   Secondary Decision Maker Phone Number [phone] or [phone]   Secondary Decision Maker Relationship to Patient Other relative   Confirm Advance Directive Yes, on file   Patient Would Like to Complete Advance Directive Yes   Does the patient have other document types Do Not Resuscitate     Other:    The palliative care team has discussed with patient / health care proxy about goals of care / treatment preferences for patient.  [====Goals of Care====]    Advance Care Planning 1/18/2017   Patient's Healthcare Decision Maker is: Named in scanned ACP document   Primary Decision Maker Name Nicky Soto   Primary Decision Maker Phone Number [phone] or [phone]    Primary Decision Maker Relationship to Patient Other relative   Secondary Decision Maker Name [address] [place] [address] Phone Number [phone] or [phone]   Secondary Decision Maker Relationship to Patient Other relative   Confirm Advance Directive Yes, on file   Patient Would Like to Complete Advance Directive Yes   Does the patient have other document types Do Not Resuscitate       HISTORY:     History obtained from: patient    CHIEF COMPLAINT: Luis Roy is a 68y.o. year old with a past history of PE on warfarin, who was admitted on 1/17/2017 from home with a diagnosis of neck pain and left breast mass.  Current medical issues leading to Palliative Medicine involvement include: goals of care, Mass left breast and Destructive lesion C2/3/4/5 with metastatic disease causing cord compression. She has left Breast mass and concern for mets to bone, lungs, adrenal.    HPI/SUBJECTIVE:    The patient is:   [x] Verbal and participatory  [] Non-participatory due to:      FUNCTIONAL ASSESSMENT:     Palliative Performance Scale (PPS): 50  PPS: 30       PSYCHOSOCIAL/SPIRITUAL SCREENING:     Advance Care Planning:  Advance Care Planning 1/18/2017   Patient's Healthcare Decision Maker is: Named in scanned ACP document   Primary Decision Maker Name Kehinde Abad   Primary Decision Maker Phone Number [phone] or [phone]    Primary Decision Maker Relationship to Patient Other relative   Secondary Decision Maker Name Omid Das   Secondary Decision Maker Phone Number [phone] or [phone]   Secondary Decision Maker Relationship to Patient Other relative   Confirm Advance Directive Yes, on file   Patient Would Like to Complete Advance Directive Yes   Does the patient have other document types Do Not Resuscitate      Any spiritual / Jainism concerns:  [] Yes /  [x] No    Caregiver Burnout:  [] Yes /  [x] No /  [] No Caregiver Present      Anticipatory grief assessment:   [x] Normal  / [] Maladaptive       ESAS Anxiety: Anxiety: 4    ESAS Depression: Depression: 2       REVIEW OF SYSTEMS:     Positive and pertinent negative findings in ROS are noted above in HPI.   The following systems were [x] reviewed / [] unable to be reviewed as noted in HPI  Constitutional -    Respiratory - denies current shortness of breath  Cardiac - denies chest pain    Gastrointestinal - Denies n/v/constipation  Musculoskeletal - states pain in neck, but that she takes percocet with relief  Psychiatric - admits to anxiety at times, worse at night    Modified ESAS Completed by: provider   Fatigue: 0 Drowsiness: 0   Depression: 2 Pain: 1   Anxiety: 4 Nausea: 0   Anorexia: 1 Dyspnea: 0   Best Well-Being: 3 Constipation: No   Other Problem (Comment): 0 Stool Occurrence(s): 1        PHYSICAL EXAM:     Wt Readings from Last 3 Encounters:   01/22/17 86.9 kg (191 lb 9.3 oz)   01/16/17 90.7 kg (200 lb)     Blood pressure 141/71, pulse 80, temperature 97.1 °F (36.2 °C), resp. rate 18, height 5' 3\" (1.6 m), weight 86.9 kg (191 lb 9.3 oz), SpO2 96 %, not currently breastfeeding. Pain:  Pain Scale 1: Numeric (0 - 10)  Pain Intensity 1: 5     Pain Location 1: Neck  Pain Orientation 1: Anterior  Pain Description 1: Sharp  Pain Intervention(s) 1: Medication (see MAR)    Constitutional:  Alert, NAD  Eyes: pupils equal, anicteric  ENMT: no nasal discharge, moist mucous membranes  Cardiovascular: regular rhythm, distal pulses intact  Respiratory: breathing not labored  Musculoskeletal: rigid movement of neck  Skin: warm, dry  Neurologic: following commands, moving all extremities  Psychiatric: full affect, oriented x 4     HISTORY:     Active Problems:    Neck mass (1/17/2017)      Malignant neoplasm of upper-outer quadrant of left female breast ([address] (1/19/2017)      Bone metastasis ([address] (1/19/2017)      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 No past surgical history on file. No family history on file. History reviewed, no pertinent family history.   Social History   Substance Use Topics    Smoking status: Not on file    Smokeless tobacco: Not on file    Alcohol use Not on file     Allergies   Allergen Reactions    Morphine Nausea and Vomiting      Current Facility-Administered Medications   Medication Dose Route Frequency    enoxaparin (LOVENOX) injection 130 mg  130 mg SubCUTAneous Q24H    LORazepam (ATIVAN) tablet 1 mg  1 mg Oral QHS    dexamethasone (DECADRON) 4 mg/mL injection 4 mg 4 mg IntraVENous Q6H    albuterol-ipratropium (DUO-NEB) 2.5 MG-0.5 MG/3 ML  3 mL Nebulization Q4H PRN    insulin lispro (HUMALOG) injection   SubCUTAneous AC&HS    dextrose (D50W) injection syrg 12.5-25 g  25-50 mL IntraVENous PRN    glucose chewable tablet 16 g  4 Tab Oral PRN    glucagon (GLUCAGEN) injection 1 mg  1 mg IntraMUSCular PRN    sodium chloride (NS) flush 5-10 mL  5-10 mL IntraVENous Q8H    sodium chloride (NS) flush 5-10 mL  5-10 mL IntraVENous PRN    famotidine (PEPCID) tablet 20 mg  20 mg Oral ON CALL TO OR    celecoxib (CELEBREX) capsule 400 mg  400 mg Oral ON CALL TO OR    pregabalin (LYRICA) capsule 50 mg  50 mg Oral ON CALL TO OR    0.9% sodium chloride infusion  75 mL/hr IntraVENous CONTINUOUS    LORazepam (ATIVAN) injection 1 mg  1 mg IntraVENous Q6H PRN    0.9% sodium chloride infusion 250 mL  250 mL IntraVENous PRN    calcium acetate-aluminum sulf (DOMEBORO) 5 Packet, sodium chloride irrigation 0.9 % 500 mL   Topical BID    ondansetron (ZOFRAN) injection 4 mg  4 mg IntraVENous Q4H PRN    influenza vaccine 2016-17 (36mos+)(PF) (FLUZONE/FLUARIX/FLULAVAL QUAD) injection 0.5 mL  0.5 mL IntraMUSCular PRIOR TO DISCHARGE    HYDROmorphone (DILAUDID) syringe 0.5 mg  0.5 mg IntraVENous Q4H PRN    oxyCODONE-acetaminophen (PERCOCET) 5-325 mg per tablet 1 Tab  1 Tab Oral Q4H PRN    acetaminophen (TYLENOL) tablet 500 mg  500 mg Oral Q6H PRN    famotidine (PEPCID) tablet 20 mg  20 mg Oral BID    docusate sodium (COLACE) capsule 100 mg  100 mg Oral BID    multivitamin, tx-iron-ca-min (THERA-M w/ IRON) tablet 1 Tab  1 Tab Oral DAILY      LAB AND IMAGING FINDINGS:     Lab Results   Component Value Date/Time    WBC 11.2 01/26/2017 03:42 AM    HGB 9.9 01/26/2017 08:20 PM    PLATELET 423 22/76/8653 03:42 AM     Lab Results   Component Value Date/Time    Sodium 139 01/26/2017 03:42 AM    Potassium 4.1 01/26/2017 03:42 AM    Chloride 100 01/26/2017 03:42 AM    CO2 31 01/26/2017 03:42 AM    BUN 23 01/26/2017 03:42 AM    Creatinine 0.73 01/26/2017 03:42 AM    Calcium 8.6 01/26/2017 03:42 AM    Magnesium 2.2 01/23/2017 12:42 AM      Lab Results   Component Value Date/Time    AST 35 01/24/2017 02:50 AM    Alk. phosphatase 53 01/24/2017 02:50 AM    Protein, total 6.0 01/24/2017 02:50 AM    Albumin 2.1 01/24/2017 02:50 AM    Globulin 3.9 01/24/2017 02:50 AM     Lab Results   Component Value Date/Time    INR 1.2 01/26/2017 03:42 AM    Prothrombin time 14.8 01/26/2017 03:42 AM    aPTT 31.1 01/26/2017 08:20 PM      No results found for: IRON, FE, TIBC, IBCT, PSAT, FERR   No results found for: PH, PCO2, PO2  No components found for: [place]   Lab Results   Component Value Date/Time    CK 28 01/24/2017 02:50 AM    CK - MB 1.1 01/24/2017 02:50 AM            Total time: 35 minutes   Counseling / coordination time:  25 minutes  > 50% counseling / coordination?: yes    Prolonged service was provided for  []30 min   []75 min in face to face time in the presence of the patient. Time Start:   Time End:   Note: this can only be billed with 92163 (initial) or 24338 (follow up). If multiple start / stop times, list each separately.

## 2017-01-27 NOTE — PROGRESS NOTES
Bedside and Verbal shift change report given to Danny landry RN(oncoming nurse) by Liz Saucedo RN (offgoing nurse). Report included the following information SBAR, Kardex, MAR and Recent Results. SITUATION:    Code Status: DNR  Reason for Admission: Cervical spine instability [M53.2X2]  Cervical spinal cord compression ([facility] [G95.20]   Cancer, metastatic to bone [facility]) [C79.51]    [facility] day: 10   Problem List:       Hospital Problems  Date Reviewed: 1/19/2017          Codes Class Noted POA    Debility ICD-10-CM: R53.81  ICD-9-CM: 799.3  1/23/2017 Unknown        Pulmonary embolism ([facility] ICD-10-CM: I26.99  ICD-9-CM: 415.19  1/23/2017 Unknown        Malignant neoplasm of upper-outer quadrant of left female breast ([facility] ICD-10-CM: C50.412  ICD-9-CM: 174.4  1/19/2017 Yes        Bone metastasis ([facility] ICD-10-CM: C79.51  ICD-9-CM: 198.5  1/19/2017 Yes        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 Patient taking anticoagulants yes     ASSESSMENT:    Changes in Assessment Throughout Shift: none     Patient has Central Line: yes Reasons if yes: .  Patient has Negro Cath: no Reasons if yes: .  Last Vitals:     Vitals:    01/26/17 0824 01/26/17 1520 01/26/17 1938 01/27/17 0336   BP: 137/67 139/70 146/73 141/71   Pulse: 89 98 97 80   Resp: 18 20 18 18   Temp: 97.6 °F (36.4 °C) 98 °F (36.7 °C) 99.3 °F (37.4 °C) 97.1 °F (36.2 °C)   SpO2: 97% 95% 95% 96%   Weight:       Height:            IV and DRAINS (will only show if present)   [REMOVED] Peripheral IV 01/19/17 Left; [address] [place] [address] Arm-Site Assessment: Clean, dry, & intact  [REMOVED] Peripheral IV 01/19/17 Left Hand-Site Assessment: Clean, dry, & intact  [REMOVED] Peripheral IV 01/19/17 Right Arm-Site Assessment: Clean, dry, & intact  [REMOVED] Peripheral IV 01/20/17 Left Forearm-Site Assessment: Clean, dry, & intact  [REMOVED] Peripheral IV 01/21/17 Left Wrist-Site Assessment: Clean, dry, & intact  Peripheral IV 01/22/17 Left Hand-Site Assessment: Clean, dry, & intact  Peripheral IV 01/22/17 Right Wrist-Site Assessment: Clean, dry, & intact  [REMOVED] Peripheral IV 01/17/17 Left Antecubital-Site Assessment: Clean, dry, & intact     WOUND (if present)   Wound Type:  Left breast   Dressing present Dressing Present : Yes   Wound Concerns/Notes:  none     PAIN    Pain Assessment    Pain Intensity 1: 0 (01/26/17 1613)    Pain Location 1: Neck    Pain Intervention(s) 1: Medication (see MAR)    Patient Stated Pain Goal: 0  o Interventions for Pain:  See MAR  o Intervention effective: yes  o Time of last intervention: See MAR   o Reassessment Completed: yes      Last 3 Weights:  Last 3 Recorded Weights in this Encounter    01/17/17 1713 01/22/17 2000   Weight: 90.7 kg (200 lb) 86.9 kg (191 lb 9.3 oz)     Weight change:      INTAKE/OUPUT    Current Shift:      Last three shifts: 01/25 0701 - 01/26 1900  In: 1180 [P.O.:1180]  Out: 1900 [Urine:1900]     LAB RESULTS     Recent Labs      01/26/17 2020 01/26/17   0916  01/26/17   0342   WBC   --    --   11.2   HGB  9.9*  10.3*  9.3*   HCT  30.0*  31.9*  28.3*   PLT   --    --   361        Recent Labs      01/26/17   0342   NA  139   K  4.1   GLU  144*   BUN  23*   CREA  0.73   CA  8.6   INR  1.2       RECOMMENDATIONS AND DISCHARGE PLANNING     1. Pending tests/procedures/ Plan of Care or Other Needs: none     2. Discharge plan for patient and Needs/Barriers: hospice    3. Estimated Discharge Date: TBD Posted on Whiteboard in Patients Room: yes      4.  The patient's care plan was reviewed with the oncoming nurse. \"HEALS\" SAFETY CHECK      Fall Risk    Total Score: 3    Safety Measures: Safety Measures: Bed/Chair-Wheels locked, Bed in low position, Call light within reach    A safety check occurred in the patient's room between off going nurse and oncoming nurse listed above. The safety check included the below items  Area Items   H  High Alert Medications - Verify all high alert medication drips (heparin, PCA, etc.)   E  Equipment - Suction is set up for ALL patients (with madhav)  - Red plugs utilized for all equipment (IV pumps, etc.)  - WOWs wiped down at end of shift.  - Room stocked with oxygen, suction, and other unit-specific supplies   A  Alarms - Bed alarm is set for fall risk patients  - Ensure chair alarm is in place and activated if patient is up in a chair   L  Lines - Check IV for any infiltration  - Negro bag is empty if patient has a Negro   - Tubing and IV bags are labeled   S  Safety   - Room is clean, patient is clean, and equipment is clean. - Hallways are clear from equipment besides carts. - Fall bracelet on for fall risk patients  - Ensure room is clear and free of clutter  - Suction is set up for ALL patients (with madhav)  - Hallways are clear from equipment besides carts.    - Isolation precautions followed, supplies available outside room, sign posted     Nestor Allred RN

## 2017-01-27 NOTE — PROGRESS NOTES
attempted follow up Palliative visit with patient who appears to be asleep at this time; no family is present. Gaurav Hatch will try to visit again later today. Tania Jacobo, Palliative     1982  initiated follow up visit; patient is awake and welcomed 's visit and invited me to sit down.  offered listening support as patient described her life recently, \"it's been chaos, so many people telling me too much to take in.\" Patient stated that Ativan and pain medication had helped her sleep a little better. Patient continued so share with  that her nicolette has helped her cope. She refers to God as \"my stability,\" and that she enjoys [employer] daily devotions which keep her in [employer]. One of her favorite verses is \"for everything there is a time and a season,\" from [place], and she described this time in her life as a season where she is able to verbalize and share her story like she has never been able to do before. Patient shared that she has the support of her cousins with whom she has recently reconciled, adding that her cousins now refer to her as \"the catalyst.\" Ms Abi Wiley expressed a great relief in being in relationship with her cousins again. Patient shared some of her hopes for herself - \"to be cured\" - and hopes for the country with President Trump's election. Spiritual conversation and stories from her childhood were shared.  offered contact information to patient and shared \"breath prayer\" exercises to help her relax; patient fell asleep! Gaurav Hatch will continue to be available for follow up support.

## 2017-01-27 NOTE — PROGRESS NOTES
Assessment completed. Patient alert and oriented x 4. Patient resting with eyes open. Patient denies SOB and chest pain. No respiratory distress noted. Bowel sounds present, but hypoactive in all four quadrants. Mass to left neck, wound to left breast. Dressing changed to left breast minimal draining present. New dressing is clean, dry and intact. Call bell and phone within reach.   No further concerns at this time

## 2017-01-27 NOTE — PROGRESS NOTES
Bedside and Verbal shift change report given to Danny landry RN(oncoming nurse) by Juan Arita RN (offgoing nurse). Report included the following information SBAR, Kardex, MAR and Recent Results. SITUATION:    Code Status: DNR  Reason for Admission: Cervical spine instability [M53.2X2]  Cervical spinal cord compression ([facility] [G95.20]   Cancer, metastatic to bone [facility]) [C79.51]    [facility] day: 10   Problem List:       Hospital Problems  Date Reviewed: 1/19/2017          Codes Class Noted POA    Debility ICD-10-CM: R53.81  ICD-9-CM: 799.3  1/23/2017 Unknown        Pulmonary embolism ([facility] ICD-10-CM: I26.99  ICD-9-CM: 415.19  1/23/2017 Unknown        Malignant neoplasm of upper-outer quadrant of left female breast ([facility] ICD-10-CM: C50.412  ICD-9-CM: 174.4  1/19/2017 Yes        Bone metastasis ([facility] ICD-10-CM: C79.51  ICD-9-CM: 198.5  1/19/2017 Yes        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 Patient taking anticoagulants yes     ASSESSMENT:    Changes in Assessment Throughout Shift: none     Patient has Central Line: yes Reasons if yes: .  Patient has Negro Cath: no Reasons if yes: .  Last Vitals:     Vitals:    01/26/17 0824 01/26/17 1520 01/26/17 1938 01/27/17 0336   BP: 137/67 139/70 146/73 141/71   Pulse: 89 98 97 80   Resp: 18 20 18 18   Temp: 97.6 °F (36.4 °C) 98 °F (36.7 °C) 99.3 °F (37.4 °C) 97.1 °F (36.2 °C)   SpO2: 97% 95% 95% 96%   Weight:       Height:            IV and DRAINS (will only show if present)   [REMOVED] Peripheral IV 01/19/17 Left; [address] [place] [address] Arm-Site Assessment: Clean, dry, & intact  [REMOVED] Peripheral IV 01/19/17 Left Hand-Site Assessment: Clean, dry, & intact  [REMOVED] Peripheral IV 01/19/17 Right Arm-Site Assessment: Clean, dry, & intact  [REMOVED] Peripheral IV 01/20/17 Left Forearm-Site Assessment: Clean, dry, & intact  [REMOVED] Peripheral IV 01/21/17 Left Wrist-Site Assessment: Clean, dry, & intact  Peripheral IV 01/22/17 Left Hand-Site Assessment: Clean, dry, & intact  Peripheral IV 01/22/17 Right Wrist-Site Assessment: Clean, dry, & intact  [REMOVED] Peripheral IV 01/17/17 Left Antecubital-Site Assessment: Clean, dry, & intact     WOUND (if present)   Wound Type:  Left breast   Dressing present Dressing Present : Yes   Wound Concerns/Notes:  none     PAIN    Pain Assessment    Pain Intensity 1: 0 (01/26/17 1613)    Pain Location 1: Neck    Pain Intervention(s) 1: Medication (see MAR)    Patient Stated Pain Goal: 0  o Interventions for Pain:  See MAR  o Intervention effective: yes  o Time of last intervention: See MAR   o Reassessment Completed: yes      Last 3 Weights:  Last 3 Recorded Weights in this Encounter    01/17/17 1713 01/22/17 2000   Weight: 90.7 kg (200 lb) 86.9 kg (191 lb 9.3 oz)     Weight change:      INTAKE/OUPUT    Current Shift:      Last three shifts: 01/25 0701 - 01/26 1900  In: 1180 [P.O.:1180]  Out: 1900 [Urine:1900]     LAB RESULTS     Recent Labs      01/26/17 2020 01/26/17   0916  01/26/17   0342   WBC   --    --   11.2   HGB  9.9*  10.3*  9.3*   HCT  30.0*  31.9*  28.3*   PLT   --    --   361        Recent Labs      01/26/17   0342   NA  139   K  4.1   GLU  144*   BUN  23*   CREA  0.73   CA  8.6   INR  1.2       RECOMMENDATIONS AND DISCHARGE PLANNING     1. Pending tests/procedures/ Plan of Care or Other Needs: none     2. Discharge plan for patient and Needs/Barriers: hospice    3. Estimated Discharge Date: TBD Posted on Whiteboard in Patients Room: yes      4.  The patient's care plan was reviewed with the oncoming nurse. \"HEALS\" SAFETY CHECK      Fall Risk    Total Score: 3    Safety Measures: Safety Measures: Bed/Chair-Wheels locked, Bed in low position, Call light within reach    A safety check occurred in the patient's room between off going nurse and oncoming nurse listed above. The safety check included the below items  Area Items   H  High Alert Medications - Verify all high alert medication drips (heparin, PCA, etc.)   E  Equipment - Suction is set up for ALL patients (with madhav)  - Red plugs utilized for all equipment (IV pumps, etc.)  - WOWs wiped down at end of shift.  - Room stocked with oxygen, suction, and other unit-specific supplies   A  Alarms - Bed alarm is set for fall risk patients  - Ensure chair alarm is in place and activated if patient is up in a chair   L  Lines - Check IV for any infiltration  - Negro bag is empty if patient has a Negro   - Tubing and IV bags are labeled   S  Safety   - Room is clean, patient is clean, and equipment is clean. - Hallways are clear from equipment besides carts. - Fall bracelet on for fall risk patients  - Ensure room is clear and free of clutter  - Suction is set up for ALL patients (with madhav)  - Hallways are clear from equipment besides carts.    - Isolation precautions followed, supplies available outside room, sign posted     Gama Peters RN

## 2017-01-27 NOTE — PROGRESS NOTES
Patient voiced that she and family would now like skilled nursing facility instead of assisted living facility; stated also that today and tomorrow, family will tour skilled nursing facilities in the area and inform CM of snf choice; will assist as needed.

## 2017-01-27 NOTE — PROGRESS NOTES
SUBJECTIVE:    No new events, comfortable    OBJECTIVE:    Visit Vitals    /60    Pulse 77    Temp 97.9 °F (36.6 °C)    Resp 18    Ht 5' 3\" (1.6 m)    Wt 86.9 kg (191 lb 9.3 oz)    SpO2 96%    Breastfeeding No    BMI 33.94 kg/m2     General:  Awake, alert  Cardiovascular:  S1S2+, RRR  Pulmonary:  Coarse BS b/l  GI:  Soft, BS+, NT, ND  Extremities:  No edema         ASSESSMENT:    1. Osteolytic lesion C2 to C4 with pathologic fracture C3 with moderate cord compression C2-C4. 2. Metastatic left Breast mass including bone, lungs, adrenal.  3. Acute recurrent PE  4. Leg pain, no DVT - BETTER  5. Elevated AST, improving. 6. Low grade fever, resolved  7. Moderate protein malnutrition   8. Leukocytosis sec to steroid, better  9. Acute respiratory distress due to PE    PLAN:    S/p antibiotics  Biopsy- adenocarcinoma  Spine surgery, general surgery, and oncology input noted  On heparin drip, O2  Palliative care input noted, DNR, no escalation of care.   Continue to check labs while on heparin drip  D/w patient    CMP:   Lab Results   Component Value Date/Time     01/27/2017 09:10 AM    K 4.1 01/27/2017 09:10 AM    CL 99 (L) 01/27/2017 09:10 AM    CO2 32 01/27/2017 09:10 AM    AGAP 6 01/27/2017 09:10 AM     (H) 01/27/2017 09:10 AM    BUN 24 (H) 01/27/2017 09:10 AM    CREA 0.74 01/27/2017 09:10 AM    GFRAA >60 01/27/2017 09:10 AM    GFRNA >60 01/27/2017 09:10 AM    CA 9.2 01/27/2017 09:10 AM     CBC:   Lab Results   Component Value Date/Time    WBC 13.7 (H) 01/27/2017 09:10 AM    HGB 10.7 (L) 01/27/2017 09:10 AM    HCT 32.3 (L) 01/27/2017 09:10 AM     01/27/2017 09:10 AM     Jeanne Scott MD

## 2017-01-28 NOTE — PROGRESS NOTES
Pt refusing vitals and blood sugar. Night nurse and CNA informed pt of why she needed. Pt still refused. Will try again after pt rested.

## 2017-01-28 NOTE — PROGRESS NOTES
Bedside and Verbal shift change report given to Chito Garcia RN (oncoming nurse) by Indira Jacob (offgoing nurse). Report included the following information SBAR, Kardex, MAR and Recent Results. SITUATION:    Code Status: DNR   Reason for Admission: Cervical spine instability [M53.2X2]   Cervical spinal cord compression (HCC) [G95.20]   Cancer, metastatic to bone [facility]) [C79.51]    [facility] day: 10   Problem List:       Hospital Problems  Date Reviewed: 1/19/2017          Codes Class Noted POA    Debility ICD-10-CM: R53.81  ICD-9-CM: 799.3  1/23/2017 Unknown        Pulmonary embolism ([facility] [address] ICD-10-CM: I26.99  ICD-9-CM: 415.19  1/23/2017 Unknown        Malignant neoplasm of upper-outer quadrant of left female breast ([facility] [address] ICD-10-CM: C50.412  ICD-9-CM: 174.4  1/19/2017 Yes        Bone metastasis ([facility] [address] ICD-10-CM: C79.51  ICD-9-CM: 198.5  1/19/2017 Yes        Spinal cord compression due to malignant neoplasm metastatic to spine [facility]) ICD-10-CM: G95.20, C79.51  ICD-9-CM: 336.9, 198.5  1/19/2017 Yes        Malignant neoplasm metastatic to right lung [facility]) ICD-10-CM: C78.01  ICD-9-CM: 197.0  1/19/2017 Yes        Weakness of both arms ICD-10-CM: M62.81  ICD-9-CM: 729.89  1/19/2017 Yes        Neck mass ICD-10-CM: R22.1  ICD-9-CM: 784.2  1/17/2017 Yes              BACKGROUND:    Past Medical History: No past medical history on file. Patient taking anticoagulants yes     ASSESSMENT:    Changes in Assessment Throughout Shift: no     Patient has Central Line: no Reasons if yes: .  Patient has Negro Cath: no Reasons if yes: .  Last Vitals:     Vitals:    01/26/17 1938 01/27/17 0336 01/27/17 0840 01/27/17 1600   BP: 146/73 141/71 133/60 140/69   Pulse: 97 80 77 94   Resp: 18 18 18 18   Temp: 99.3 °F (37.4 °C) 97.1 °F (36.2 °C) 97.9 °F (36.6 °C) 97.8 °F (36.6 °C)   SpO2: 95% 96% 96% 95%   Weight:       Height:            IV and DRAINS (will only show if present)   [REMOVED] Peripheral IV 01/19/17 Left; [address] Arm-Site Assessment: Clean, dry, & intact  [REMOVED] Peripheral IV 01/19/17 Left Hand-Site Assessment: Clean, dry, & intact  [REMOVED] Peripheral IV 01/19/17 Right Arm-Site Assessment: Clean, dry, & intact  [REMOVED] Peripheral IV 01/20/17 Left Forearm-Site Assessment: Clean, dry, & intact  [REMOVED] Peripheral IV 01/21/17 Left Wrist-Site Assessment: Clean, dry, & intact  Peripheral IV 01/22/17 Left Hand-Site Assessment: Clean, dry, & intact  Peripheral IV 01/22/17 Right Wrist-Site Assessment: Clean, dry, & intact  [REMOVED] Peripheral IV 01/17/17 Left Antecubital-Site Assessment: Clean, dry, & intact     WOUND (if present)   Wound Type:  none   Dressing present Dressing Present : Yes   Wound Concerns/Notes:  none     PAIN    Pain Assessment    Pain Intensity 1: 5 (01/27/17 1837)    Pain Location 1: Neck    Pain Intervention(s) 1: Medication (see MAR)    Patient Stated Pain Goal: 0  o Interventions for Pain:  See MAR  o Intervention effective: yes  o Time of last intervention: See MAR   o Reassessment Completed: yes      Last 3 Weights:  Last 3 Recorded Weights in this Encounter    01/17/17 1713 01/22/17 2000   Weight: 90.7 kg (200 lb) 86.9 kg (191 lb 9.3 oz)     Weight change:      INTAKE/OUPUT    Current Shift:      Last three shifts: 01/26 0701 - 01/27 1900  In: 720 [P.O.:720]  Out: 2000 [Urine:2000]     LAB RESULTS     Recent Labs      01/27/17   0910  01/26/17 2020 01/26/17   0916  01/26/17   0342   WBC  13.7*   --    --   11.2   HGB  10.7*  9.9*  10.3*  9.3*   HCT  32.3*  30.0*  31.9*  28.3*   PLT  416   --    --   361        Recent Labs      01/27/17   0910  01/26/17   0342   NA  137  139   K  4.1  4.1   GLU  135*  144*   BUN  24*  23*   CREA  0.74  0.73   CA  9.2  8.6   INR  1.1  1.2       RECOMMENDATIONS AND DISCHARGE PLANNING     1. Pending tests/procedures/ Plan of Care or Other Needs: none     2. Discharge plan for patient and Needs/Barriers: hospice    3.  Estimated Discharge Date: TBD Posted on Whiteboard in [facility]: yes      4. The patient's care plan was reviewed with the oncoming nurse. \"HEALS\" SAFETY CHECK      Fall Risk    Total Score: 3    Safety Measures: Safety Measures: Bed/Chair-Wheels locked, Bed in low position, Call light within reach    A safety check occurred in the patient's room between off going nurse and oncoming nurse listed above. The safety check included the below items  Area Items   H  High Alert Medications - Verify all high alert medication drips (heparin, PCA, etc.)   E  Equipment - Suction is set up for ALL patients (with madhav)  - Red plugs utilized for all equipment (IV pumps, etc.)  - WOWs wiped down at end of shift.  - Room stocked with oxygen, suction, and other unit-specific supplies   A  Alarms - Bed alarm is set for fall risk patients  - Ensure chair alarm is in place and activated if patient is up in a chair   L  Lines - Check IV for any infiltration  - Negro bag is empty if patient has a Negro   - Tubing and IV bags are labeled   S  Safety   - Room is clean, patient is clean, and equipment is clean. - Hallways are clear from equipment besides carts. - Fall bracelet on for fall risk patients  - Ensure room is clear and free of clutter  - Suction is set up for ALL patients (with madhav)  - Hallways are clear from equipment besides carts.    - Isolation precautions followed, supplies available outside room, sign posted     Robson Mcallister

## 2017-01-28 NOTE — ROUTINE PROCESS
Bedside and Verbal shift change report given to Leander Salvador (oncoming nurse) by Dominic Boyer RN (offgoing nurse). Report included the following information SBAR, Kardex, Procedure Summary, Intake/Output, MAR and Recent Results.

## 2017-01-28 NOTE — ROUTINE PROCESS
Bedside and Verbal shift change report given to Akbar Crawford (oncoming nurse) by Adriana Sharma RN (offgoing nurse). Report included the following information SBAR, Kardex, MAR and Recent Results. SITUATION:    Code Status: DNR   Reason for Admission: Cervical spine instability [M53.2X2]   Cervical spinal cord compression (HCC) [G95.20]   Cancer, metastatic to bone [facility]) [C79.51]    [facility] day: 6   Problem List:       Hospital Problems  Date Reviewed: 1/19/2017          Codes Class Noted POA    Debility ICD-10-CM: R53.81  ICD-9-CM: 799.3  1/23/2017 Unknown        Pulmonary embolism ([facility] [address] ICD-10-CM: I26.99  ICD-9-CM: 415.19  1/23/2017 Unknown        Malignant neoplasm of upper-outer quadrant of left female breast ([facility] [address] ICD-10-CM: C50.412  ICD-9-CM: 174.4  1/19/2017 Yes        Bone metastasis ([facility] [address] ICD-10-CM: C79.51  ICD-9-CM: 198.5  1/19/2017 Yes        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) ICD-10-CM: G95.20, C79.51  ICD-9-CM: 336.9, 198.5  1/19/2017 Yes        Malignant neoplasm metastatic to right lung [facility]) ICD-10-CM: C78.01  ICD-9-CM: 197.0  1/19/2017 Yes        Weakness of both arms ICD-10-CM: M62.81  ICD-9-CM: 729.89  1/19/2017 Yes        Neck mass ICD-10-CM: R22.1  ICD-9-CM: 784.2  1/17/2017 Yes              BACKGROUND:    Past Medical History: No past medical history on file.       Patient taking anticoagulants yes     ASSESSMENT:    Changes in Assessment Throughout Shift: dressing change     Patient has Central Line: no Reasons if yes: n/a   Patient has Negro Cath: no Reasons if yes: n/a      Last Vitals:     Vitals:    01/27/17 1600 01/27/17 2000 01/28/17 0904 01/28/17 1121   BP: 140/69 138/71 132/73 128/70   Pulse: 94 94 86 76   Resp: 18 18 19 18   Temp: 97.8 °F (36.6 °C) 97.4 °F (36.3 °C) 97.7 °F (36.5 °C) 97.5 °F (36.4 °C)   SpO2: 95% 96% 94% 98%   Weight:       Height:            IV and DRAINS (will only show if present)   [REMOVED] Peripheral IV 01/19/17 Left; Inner Arm-Site Assessment: Clean, dry, & intact  [REMOVED] Peripheral IV 01/19/17 Left Hand-Site Assessment: Clean, dry, & intact  [REMOVED] Peripheral IV 01/19/17 Right Arm-Site Assessment: Clean, dry, & intact  [REMOVED] Peripheral IV 01/20/17 Left Forearm-Site Assessment: Clean, dry, & intact  [REMOVED] Peripheral IV 01/21/17 Left Wrist-Site Assessment: Clean, dry, & intact  Peripheral IV 01/22/17 Left Hand-Site Assessment: Clean, dry, & intact  Peripheral IV 01/22/17 Right Wrist-Site Assessment: Clean, dry, & intact  [REMOVED] Peripheral IV 01/17/17 Left Antecubital-Site Assessment: Clean, dry, & intact     WOUND (if present)   Wound Type:  breast   Dressing present Dressing Present : Yes   Wound Concerns/Notes:  Black with blisters     PAIN    Pain Assessment    Pain Intensity 1: 0 (01/28/17 1552)    Pain Location 1: Neck    Pain Intervention(s) 1: Medication (see MAR)    Patient Stated Pain Goal: 0  o Interventions for Pain:  See mar  o Intervention effective: yes  o Time of last intervention: see mar   o Reassessment Completed: yes      Last 3 Weights:  Last 3 Recorded Weights in this Encounter    01/17/17 1713 01/22/17 2000   Weight: 90.7 kg (200 lb) 86.9 kg (191 lb 9.3 oz)     Weight change:      INTAKE/OUPUT    Current Shift:      Last three shifts: 01/26 1901 - 01/28 0700  In: 240 [P.O.:240]  Out: 900 [Urine:900]     LAB RESULTS     Recent Labs      01/28/17   0947  01/27/17   1929  01/27/17   0910   01/26/17   0342   WBC   --    --   13.7*   --   11.2   HGB  10.7*  10.6*  10.7*   < >  9.3*   HCT  32.2*  31.7*  32.3*   < >  28.3*   PLT   --    --   416   --   361    < > = values in this interval not displayed. Recent Labs      01/27/17   0910  01/26/17   0342   NA  137  139   K  4.1  4.1   GLU  135*  144*   BUN  24*  23*   CREA  0.74  0.73   CA  9.2  8.6   INR  1.1  1.2       RECOMMENDATIONS AND DISCHARGE PLANNING     1.  Pending tests/procedures/ Plan of Care or Other Needs: palliative     2. Discharge plan for patient and Needs/Barriers: hospice    3. Estimated Discharge Date: TBD Posted on Whiteboard in Patients Room: yes      4. The patient's care plan was reviewed with the oncoming nurse. \"HEALS\" SAFETY CHECK      Fall Risk    Total Score: 3    Safety Measures: Safety Measures: Bed/Chair-Wheels locked, Bed in low position, Call light within reach, Gripper socks    A safety check occurred in the patient's room between off going nurse and oncoming nurse listed above. The safety check included the below items  Area Items   H  High Alert Medications - Verify all high alert medication drips (heparin, PCA, etc.)   E  Equipment - Suction is set up for ALL patients (with madhav)  - Red plugs utilized for all equipment (IV pumps, etc.)  - WOWs wiped down at end of shift.  - Room stocked with oxygen, suction, and other unit-specific supplies   A  Alarms - Bed alarm is set for fall risk patients  - Ensure chair alarm is in place and activated if patient is up in a chair   L  Lines - Check IV for any infiltration  - Negro bag is empty if patient has a Negro   - Tubing and IV bags are labeled   S  Safety   - Room is clean, patient is clean, and equipment is clean. - Hallways are clear from equipment besides carts. - Fall bracelet on for fall risk patients  - Ensure room is clear and free of clutter  - Suction is set up for ALL patients (with madhav)  - Hallways are clear from equipment besides carts.    - Isolation precautions followed, supplies available outside room, sign posted     Shellie Seay RN

## 2017-01-29 NOTE — ROUTINE PROCESS
Bedside and Verbal shift change report given to [address] (oncoming nurse) by James Prasad RN (offgoing nurse). Report included the following information SBAR, Kardex, MAR and Recent Results. SITUATION:    Code Status: DNR  Reason for Admission: Cervical spine instability [M53.2X2]  Cervical spinal cord compression ([facility] [address] [G95.20]   Cancer, metastatic to bone [facility]) [C79.51]    [facility] day: 15   Problem List:       Hospital Problems  Date Reviewed: 1/19/2017          Codes Class Noted POA    Debility ICD-10-CM: R53.81  ICD-9-CM: 799.3  1/23/2017 Unknown        Pulmonary embolism ([facility] [address] ICD-10-CM: I26.99  ICD-9-CM: 415.19  1/23/2017 Unknown        Malignant neoplasm of upper-outer quadrant of left female breast ([facility] [address] ICD-10-CM: C50.412  ICD-9-CM: 174.4  1/19/2017 Yes        Bone metastasis ([facility] [address] ICD-10-CM: C79.51  ICD-9-CM: 198.5  1/19/2017 Yes        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       Patient taking anticoagulants yes     ASSESSMENT:    Changes in Assessment Throughout Shift: dressing change     Patient has Central Line: no Reasons if yes: n/a   Patient has Negro Cath: no Reasons if yes: n/a      Last Vitals:     Vitals:    01/28/17 0904 01/28/17 1121 01/28/17 1414 01/28/17 1923   BP: 132/73 128/70 133/74 140/79   Pulse: 86 76 72 85   Resp: 19 18 20 19   Temp: 97.7 °F (36.5 °C) 97.5 °F (36.4 °C) 98 °F (36.7 °C) 97.5 °F (36.4 °C)   SpO2: 94% 98% 99% 95%   Weight:       Height:            IV and DRAINS (will only show if present)   [REMOVED] Peripheral IV 01/19/17 Left; Inner Arm-Site Assessment: Clean, dry, & intact  [REMOVED] Peripheral IV 01/19/17 Left Hand-Site Assessment: Clean, dry, & intact  [REMOVED] Peripheral IV 01/19/17 Right Arm-Site Assessment: Clean, dry, & intact  [REMOVED] Peripheral IV 01/20/17 Left Forearm-Site Assessment: Clean, dry, & intact  [REMOVED] Peripheral IV 01/21/17 Left Wrist-Site Assessment: Clean, dry, & intact  Peripheral IV 01/22/17 Left Hand-Site Assessment: Clean, dry, & intact  Peripheral IV 01/22/17 Right Wrist-Site Assessment: Clean, dry, & intact  [REMOVED] Peripheral IV 01/17/17 Left Antecubital-Site Assessment: Clean, dry, & intact     WOUND (if present)   Wound Type:  breast   Dressing present Dressing Present : Yes   Wound Concerns/Notes:  Black with blisters     PAIN    Pain Assessment    Pain Intensity 1: 0 (01/29/17 0354)    Pain Location 1: Incisional, Neck    Pain Intervention(s) 1: Medication (see MAR)    Patient Stated Pain Goal: 0  o Interventions for Pain:  See mar  o Intervention effective: yes  o Time of last intervention: see mar   o Reassessment Completed: yes      Last 3 Weights:  Last 3 Recorded Weights in this Encounter    01/17/17 1713 01/22/17 2000   Weight: 90.7 kg (200 lb) 86.9 kg (191 lb 9.3 oz)     Weight change:      INTAKE/OUPUT    Current Shift: 01/28 1901 - 01/29 0700  In: 1312 [P.O.:960; I.V.:7975]  Out: 1500 [Urine:1500]    Last three shifts: 01/27 0701 - 01/28 1900  In: 240 [P.O.:240]  Out: 900 [Urine:900]     LAB RESULTS     Recent Labs      01/28/17   0947  01/27/17   1929  01/27/17   0910   WBC   --    --   13.7*   HGB  10.7*  10.6*  10.7*   HCT  32.2*  31.7*  32.3*   PLT   --    --   416        Recent Labs      01/27/17   0910   NA  137   K  4.1   GLU  135*   BUN  24*   CREA  0.74   CA  9.2   INR  1.1       RECOMMENDATIONS AND DISCHARGE PLANNING     1. Pending tests/procedures/ Plan of Care or Other Needs: palliative     2.  Discharge plan for patient and Needs/Barriers: hospice    3. Estimated Discharge Date: TBD Posted on Whiteboard in Patients Room: yes      4. The patient's care plan was reviewed with the oncoming nurse. \"HEALS\" SAFETY CHECK      Fall Risk    Total Score: 3    Safety Measures: Safety Measures: Bed/Chair-Wheels locked, Bed in low position, Caregiver at bedside, Call light within reach    A safety check occurred in the patient's room between off going nurse and oncoming nurse listed above. The safety check included the below items  Area Items   H  High Alert Medications - Verify all high alert medication drips (heparin, PCA, etc.)   E  Equipment - Suction is set up for ALL patients (with madhav)  - Red plugs utilized for all equipment (IV pumps, etc.)  - WOWs wiped down at end of shift.  - Room stocked with oxygen, suction, and other unit-specific supplies   A  Alarms - Bed alarm is set for fall risk patients  - Ensure chair alarm is in place and activated if patient is up in a chair   L  Lines - Check IV for any infiltration  - Negro bag is empty if patient has a Negro   - Tubing and IV bags are labeled   S  Safety   - Room is clean, patient is clean, and equipment is clean. - Hallways are clear from equipment besides carts. - Fall bracelet on for fall risk patients  - Ensure room is clear and free of clutter  - Suction is set up for ALL patients (with madhav)  - Hallways are clear from equipment besides carts.    - Isolation precautions followed, supplies available outside room, sign posted     Dae Thomas RN

## 2017-01-29 NOTE — ROUTINE PROCESS
2000 Patient is alert and oriented times three with no signs or symptoms of distress. Her dressing on her chest is intact and she is sitting on the bedside commode. She had a large nadir colored bowel movement. 0000 Patient is alert and oriented times three with no signs or symptoms of distress. Dressing is intact  0400 Patient is alert and oriented times three with no signs or symptoms of distress.

## 2017-01-29 NOTE — PROGRESS NOTES
SUBJECTIVE:    comfortable    OBJECTIVE:    Visit Vitals    /64 (BP 1 Location: Left arm, BP Patient Position: At rest)    Pulse 88    Temp 98.1 °F (36.7 °C)    Resp 20    Ht 5' 3\" (1.6 m)    Wt 86.9 kg (191 lb 9.3 oz)    SpO2 95%    Breastfeeding No    BMI 33.94 kg/m2     General:  Awake, alert  Cardiovascular:  S1S2+, RRR  Pulmonary:  Coarse BS b/l  GI:  Soft, BS+, NT, ND  Extremities:  No edema         ASSESSMENT:    1. Osteolytic lesion C2 to C4 with pathologic fracture C3 with moderate cord compression C2-C4. 2. Metastatic left Breast mass including bone, lungs, adrenal.  3. Acute recurrent PE  4. Leg pain, no DVT - BETTER  5. Elevated AST, improving. 6. Low grade fever, resolved  7. Moderate protein malnutrition   8. Leukocytosis sec to steroid, better  9. Acute respiratory distress due to PE    PLAN:    S/p antibiotics  Biopsy- adenocarcinoma  Spine surgery, general surgery, and oncology input noted  On heparin drip, O2  Palliative care input noted, DNR, no escalation of care.   Continue to check labs while on heparin drip  D/w patient and niece  snf placement    CMP:   No results found for: NA, K, CL, CO2, AGAP, GLU, BUN, CREA, GFRAA, GFRNA, CA, MG, PHOS, ALB, TBIL, TP, ALB, GLOB, AGRAT, SGOT, ALT, GPT  CBC:   Lab Results   Component Value Date/Time    HGB 10.4 (L) 01/29/2017 10:20 AM    HCT 30.7 (L) 01/29/2017 10:20 AM     Martha Jean MD

## 2017-01-29 NOTE — ROUTINE PROCESS
Bedside and Verbal shift change report given to Jack Leos (oncoming nurse) by Gisella Colmenares RN (offgoing nurse). Report included the following information SBAR, Kardex, MAR and Recent Results. SITUATION:    Code Status: DNR   Reason for Admission: Cervical spine instability [M53.2X2]   Cervical spinal cord compression (HCC) [G95.20]   Cancer, metastatic to bone [facility]) [C79.51]    [facility] day: 15   Problem List:       Hospital Problems  Date Reviewed: 1/19/2017          Codes Class Noted POA    Debility ICD-10-CM: R53.81  ICD-9-CM: 799.3  1/23/2017 Unknown        Pulmonary embolism ([facility] [address] ICD-10-CM: I26.99  ICD-9-CM: 415.19  1/23/2017 Unknown        Malignant neoplasm of upper-outer quadrant of left female breast ([facility] [address] ICD-10-CM: C50.412  ICD-9-CM: 174.4  1/19/2017 Yes        Bone metastasis ([facility] [address] ICD-10-CM: C79.51  ICD-9-CM: 198.5  1/19/2017 Yes        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 Patient taking anticoagulants no     ASSESSMENT:    Changes in Assessment Throughout Shift: n/a     Patient has Central Line: no Reasons if yes: n/a   Patient has Negro Cath: no Reasons if yes: n/a      Last Vitals:     Vitals:    01/28/17 1414 01/28/17 1923 01/29/17 0854 01/29/17 1623   BP: 133/74 140/79 130/64 131/69   Pulse: 72 85 88 94   Resp: 20 19 20 20   Temp: 98 °F (36.7 °C) 97.5 °F (36.4 °C) 98.1 °F (36.7 °C) 98.8 °F (37.1 °C)   SpO2: 99% 95% 95% 96%   Weight:       Height:            IV and DRAINS (will only show if present)   [REMOVED] Peripheral IV 01/19/17 Left; [address] [place] [address] Arm-Site Assessment: Clean, dry, & intact  [REMOVED] Peripheral IV 01/19/17 Left Hand-Site Assessment: Clean, dry, & intact  [REMOVED] Peripheral IV 01/19/17 Right Arm-Site Assessment: Clean, dry, & intact  [REMOVED] Peripheral IV 01/20/17 Left Forearm-Site Assessment: Clean, dry, & intact  [REMOVED] Peripheral IV 01/21/17 Left Wrist-Site Assessment: Clean, dry, & intact  [REMOVED] Peripheral IV 01/22/17 Left Hand-Site Assessment: Clean, dry, & intact  [REMOVED] Peripheral IV 01/22/17 Right Wrist-Site Assessment: Other (Comment) (drainage and tape)  [REMOVED] Peripheral IV 01/17/17 Left Antecubital-Site Assessment: Clean, dry, & intact     WOUND (if present)   Wound Type:  breast   Dressing present Dressing Present : Yes   Wound Concerns/Notes:  Infection/ cancer     PAIN    Pain Assessment    Pain Intensity 1: 0 (01/29/17 1623)    Pain Location 1: Incisional, Neck    Pain Intervention(s) 1: Medication (see MAR)    Patient Stated Pain Goal: 0  o Interventions for Pain:  See mar  o Intervention effective: yes  o Time of last intervention: see mar   o Reassessment Completed: yes      Last 3 Weights:  Last 3 Recorded Weights in this Encounter    01/17/17 1713 01/22/17 2000   Weight: 90.7 kg (200 lb) 86.9 kg (191 lb 9.3 oz)     Weight change:      INTAKE/OUPUT    Current Shift:      Last three shifts: 01/27 1901 - 01/29 0700  In: 8935 [P.O.:960; I.V.:7975]  Out: 1500 [Urine:1500]     LAB RESULTS     Recent Labs      01/29/17   1020  01/28/17   0947  01/27/17   1929  01/27/17   0910   WBC   --    --    --   13.7*   HGB  10.4*  10.7*  10.6*  10.7*   HCT  30.7*  32.2*  31.7*  32.3*   PLT   --    --    --   416        Recent Labs      01/29/17   1020  01/27/17   0910   NA   --   137   K   --   4.1   GLU   --   135*   BUN   --   24*   CREA   --   0.74   CA   --   9.2   INR  1.1  1.1       RECOMMENDATIONS AND DISCHARGE PLANNING     1. Pending tests/procedures/ Plan of Care or Other Needs: talk to hospice     2.  Discharge plan for patient and Needs/Barriers: TBD    3. Estimated Discharge Date: TBD Posted on Whiteboard in Patients Room: yes      4. The patient's care plan was reviewed with the oncoming nurse. \"HEALS\" SAFETY CHECK      Fall Risk    Total Score: 3    Safety Measures: Safety Measures: Bed/Chair-Wheels locked, Bed in low position, Call light within reach, Gripper socks    A safety check occurred in the patient's room between off going nurse and oncoming nurse listed above. The safety check included the below items  Area Items   H  High Alert Medications - Verify all high alert medication drips (heparin, PCA, etc.)   E  Equipment - Suction is set up for ALL patients (with madhav)  - Red plugs utilized for all equipment (IV pumps, etc.)  - WOWs wiped down at end of shift.  - Room stocked with oxygen, suction, and other unit-specific supplies   A  Alarms - Bed alarm is set for fall risk patients  - Ensure chair alarm is in place and activated if patient is up in a chair   L  Lines - Check IV for any infiltration  - Negro bag is empty if patient has a Negro   - Tubing and IV bags are labeled   S  Safety   - Room is clean, patient is clean, and equipment is clean. - Hallways are clear from equipment besides carts. - Fall bracelet on for fall risk patients  - Ensure room is clear and free of clutter  - Suction is set up for ALL patients (with madhav)  - Hallways are clear from equipment besides carts.    - Isolation precautions followed, supplies available outside room, sign posted     Riley Cabot, RN

## 2017-01-30 NOTE — PROGRESS NOTES
SUBJECTIVE:    Comfortable, family visiting snf's, dw family, cm    OBJECTIVE:    Visit Vitals    /66 (BP 1 Location: Right arm, BP Patient Position: At rest)    Pulse 86    Temp 98.8 °F (37.1 °C)    Resp 18    Ht 5' 3\" (1.6 m)    Wt 86.9 kg (191 lb 9.3 oz)    SpO2 98%    Breastfeeding No    BMI 33.94 kg/m2     General:  Awake, alert  Cardiovascular:  S1S2+, RRR  Pulmonary:  Coarse BS b/l  GI:  Soft, BS+, NT, ND  Extremities:  No edema         ASSESSMENT:    1. Osteolytic lesion C2 to C4 with pathologic fracture C3 with moderate cord compression C2-C4. 2. Metastatic left Breast mass including bone, lungs, adrenal.  3. Acute recurrent PE  4. Leg pain, no DVT - BETTER  5. Elevated AST, improving. 6. Low grade fever, resolved  7. Moderate protein malnutrition   8. Leukocytosis sec to steroid, better  9.  Acute respiratory distress due to PE    PLAN:     Transfer to nh once agreed on by family and cm  lovenox sq  Pain control  decadron    CMP:   No results found for: NA, K, CL, CO2, AGAP, GLU, BUN, CREA, GFRAA, GFRNA, CA, MG, PHOS, ALB, TBIL, TP, ALB, GLOB, AGRAT, SGOT, ALT, GPT  CBC:   Lab Results   Component Value Date/Time    HGB 10.8 (L) 01/30/2017 09:40 AM    HCT 32.6 (L) 01/30/2017 09:40 AM     Herman Han MD

## 2017-01-30 NOTE — PROGRESS NOTES
Physical Therapy evaluation and treat order received, chart reviewed. Pt noted to have active bed rest orders. Per dept. Protocol, will wait for appropriate advancement of pt's activity orders prior to initiation of PT evaluation to ensure maximal benefit and participation.      Thank you,  SHERON BeT

## 2017-01-30 NOTE — PROGRESS NOTES
Bedside and Verbal shift change report given to Garrett Ronquillo RN (oncoming nurse) by Ryan Carver (offgoing nurse). Report included the following information SBAR, Kardex, MAR and Recent Results. SITUATION:    Code Status: DNR   Reason for Admission: Cervical spine instability [M53.2X2]   Cervical spinal cord compression (HCC) [G95.20]   Cancer, metastatic to bone [facility]) [C79.51]    [facility] day: 15   Problem List:       Hospital Problems  Date Reviewed: 1/19/2017          Codes Class Noted POA    Debility ICD-10-CM: R53.81  ICD-9-CM: 799.3  1/23/2017 Unknown        Pulmonary embolism ([facility] [address] ICD-10-CM: I26.99  ICD-9-CM: 415.19  1/23/2017 Unknown        Malignant neoplasm of upper-outer quadrant of left female breast ([facility] [address] ICD-10-CM: C50.412  ICD-9-CM: 174.4  1/19/2017 Yes        Bone metastasis ([facility] [address] ICD-10-CM: C79.51  ICD-9-CM: 198.5  1/19/2017 Yes        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) ICD-10-CM: G95.20, C79.51  ICD-9-CM: 336.9, 198.5  1/19/2017 Yes        Malignant neoplasm metastatic to right lung [facility]) ICD-10-CM: C78.01  ICD-9-CM: 197.0  1/19/2017 Yes        Weakness of both arms ICD-10-CM: M62.81  ICD-9-CM: 729.89  1/19/2017 Yes        Neck mass ICD-10-CM: R22.1  ICD-9-CM: 784.2  1/17/2017 Yes              BACKGROUND:    Past Medical History: No past medical history on file. Patient taking anticoagulants no     ASSESSMENT:    Changes in Assessment Throughout Shift: no     Patient has Central Line: no Reasons if yes: .  Patient has Negro Cath: no Reasons if yes: .  Last Vitals:     Vitals:    01/29/17 1623 01/29/17 2009 01/30/17 0843 01/30/17 1600   BP: 131/69 139/63 141/66 133/72   Pulse: 94 95 86 69   Resp: 20 20 18 18   Temp: 98.8 °F (37.1 °C) 98.3 °F (36.8 °C) 98.8 °F (37.1 °C) 98.3 °F (36.8 °C)   SpO2: 96% 97% 98% 95%   Weight:       Height:            IV and DRAINS (will only show if present)   [REMOVED] Peripheral IV 01/19/17 Left; [address] Arm-Site Assessment: Clean, dry, & intact  [REMOVED] Peripheral IV 01/19/17 Left Hand-Site Assessment: Clean, dry, & intact  [REMOVED] Peripheral IV 01/19/17 Right Arm-Site Assessment: Clean, dry, & intact  [REMOVED] Peripheral IV 01/20/17 Left Forearm-Site Assessment: Clean, dry, & intact  [REMOVED] Peripheral IV 01/21/17 Left Wrist-Site Assessment: Clean, dry, & intact  [REMOVED] Peripheral IV 01/22/17 Left Hand-Site Assessment: Clean, dry, & intact  [REMOVED] Peripheral IV 01/22/17 Right Wrist-Site Assessment: Other (Comment) (drainage and tape)  [REMOVED] Peripheral IV 01/17/17 Left Antecubital-Site Assessment: Clean, dry, & intact     WOUND (if present)   Wound Type:  none   Dressing present Dressing Present : Yes   Wound Concerns/Notes:  none     PAIN    Pain Assessment    Pain Intensity 1: 0 (01/30/17 1010)    Pain Location 1: Neck    Pain Intervention(s) 1: Medication (see MAR)    Patient Stated Pain Goal: 0  o Interventions for Pain:  See MAR  o Intervention effective: yes  o Time of last intervention: See MAR   o Reassessment Completed: yes      Last 3 Weights:  Last 3 Recorded Weights in this Encounter    01/17/17 1713 01/22/17 2000   Weight: 90.7 kg (200 lb) 86.9 kg (191 lb 9.3 oz)     Weight change:      INTAKE/OUPUT    Current Shift:      Last three shifts: 01/28 1901 - 01/30 0700  In: 37261 [P.O.:2040; I.V.:7975]  Out: 2250 [Urine:2250]     LAB RESULTS     Recent Labs      01/30/17   0940  01/29/17   1020  01/28/17   0947   HGB  10.8*  10.4*  10.7*   HCT  32.6*  30.7*  32.2*        Recent Labs      01/30/17   0940  01/29/17   1020   INR  1.0  1.1       RECOMMENDATIONS AND DISCHARGE PLANNING     Pending tests/procedures/ Plan of Care or Other Needs: none  1. Discharge plan for patient and Needs/Barriers: hospice    2. Estimated Discharge Date: TBD Posted on Whiteboard in Patients Room: yes      4. The patient's care plan was reviewed with the oncoming nurse.        \"HEALS\" SAFETY CHECK      Fall Risk    Total Score: 3    Safety Measures: Safety Measures: Bed/Chair-Wheels locked, Bed in low position, Call light within reach    A safety check occurred in the patient's room between off going nurse and oncoming nurse listed above. The safety check included the below items  Area Items   H  High Alert Medications - Verify all high alert medication drips (heparin, PCA, etc.)   E  Equipment - Suction is set up for ALL patients (with madhav)  - Red plugs utilized for all equipment (IV pumps, etc.)  - WOWs wiped down at end of shift.  - Room stocked with oxygen, suction, and other unit-specific supplies   A  Alarms - Bed alarm is set for fall risk patients  - Ensure chair alarm is in place and activated if patient is up in a chair   L  Lines - Check IV for any infiltration  - Negro bag is empty if patient has a Negro   - Tubing and IV bags are labeled   S  Safety   - Room is clean, patient is clean, and equipment is clean. - Hallways are clear from equipment besides carts. - Fall bracelet on for fall risk patients  - Ensure room is clear and free of clutter  - Suction is set up for ALL patients (with madhav)  - Hallways are clear from equipment besides carts.    - Isolation precautions followed, supplies available outside room, sign posted     [place] Ouch

## 2017-01-30 NOTE — ROUTINE PROCESS
2000 Patient is alert and oriented times three with no signs or symptoms of distress. She is up on the bedside commode at this time. Her dressing is intact. 0000 Patient is alert and oriented times three with no signs or symptoms of distress.    0400 Patient is alert and oriented times three with no signs or symptoms of distress at this time  0500 Patient refused vital signs and labs

## 2017-01-30 NOTE — PROGRESS NOTES
Problem: Mobility Impaired (Adult and Pediatric)  Goal: *Acute Goals and Plan of Care (Insert Text)  Physical Therapy Goals  Initiated 1/30/2017 and to be accomplished within 7 day(s)  1. Patient will move from supine to sit and sit to supine in bed with modified independence. 2. Patient will transfer from bed to chair and chair to bed with modified independence using the least restrictive device. 3. Patient will perform sit to stand with modified independence. 4. Patient will ambulate with modified independence for 30 feet with the least restrictive device. Outcome: Progressing Towards Goal  PHYSICAL THERAPY EVALUATION     Patient: Debi Bueno (8 y.o. female)  Date: 1/30/2017  Primary Diagnosis: Cervical spine instability [M53.2X2]  Cervical spinal cord compression (HCC) [G95.20]  Cancer, metastatic to bone (HCC) [C79.51]  Procedure(s) (LRB):  OCCIPITAL CERVICAL FUSION C1-C6 C-ARM/NUVASIVE (N/A)     Precautions:   Fall      ASSESSMENT :  Based on the objective data described below, the patient presents with decreased strength (BLE grossly ~4+/5), ROM (limited cervical and UE assessment due to instability of cervical spine, however grossly [facility]), decreased standing balance (fair), and decreased activity tolerance limiting previously independent functional mobility. Currently, pt requires supervision for sup to sit and stand to sit. CG for sit to stand. Ambulated 8' with ww and supervision; decreased velocity and bilateral step length/foot clearance. Pt standing posture with significant cervical flexion and pt states she is unable to stand erect due to pain. Pt is noted to have minimal edema about C2 spinal level posterior neck which pt indicates as source of pain. Pt returned to bed and c/o fatigue. Left with call bell resting comfortably. Patient will benefit from skilled intervention to address the above impairments.   Patients rehabilitation potential is considered to be Fair  Factors which may influence rehabilitation potential include:   [ ]         None noted  [ ]         Mental ability/status  [X]         Medical condition  [X]         Home/family situation and support systems  [ ]         Safety awareness  [ ]         Pain tolerance/management  [ ]         Other:        PLAN :  Recommendations and Planned Interventions:  [X]           Bed Mobility Training             [X]    Neuromuscular Re-Education  [X]           Transfer Training                   [ ]    Orthotic/Prosthetic Training  [X]           Gait Training                          [ ]    Modalities  [X]           Therapeutic Exercises          [ ]    Edema Management/Control  [X]           Therapeutic Activities            [X]    Patient and Family Training/Education  [ ]           Other (comment):     Frequency/Duration: Patient will be followed by physical therapy 1-2 times per day/4-7 days per week to address goals. Discharge Recommendations: Harmeet Hirsch  Further Equipment Recommendations for Discharge: to be determined and N/A       G-CODES       Mobility  Current  CJ= 20-39%   Goal  CI= 1-19%. The severity rating is based on the Other level of assistance for functional mobility     Eval Complexity: History: HIGH Complexity :3+ comorbidities / personal factors will impact the outcome/ POC Exam:MEDIUM Complexity : 3 Standardized tests and measures addressing body structure, function, activity limitation and / or participation in recreation  Presentation: MEDIUM Complexity : Evolving with changing characteristics  Clinical Decision Making:Low Complexity level of assistance for functional mobility Overall Complexity:LOW          SUBJECTIVE:   Patient stated Geno Mahajan don't have any pain if I'm just resting in bed, but as soon as I move it hurts my neck; about 6/10      OBJECTIVE DATA SUMMARY:   No past medical history on file. No past surgical history on file.   Prior Level of Function/Home Situation:   [address] [place] [address] Environment: Private residence  # Steps to Enter: 3  Rails to Enter: Yes  Hand Rails : Bilateral  One/Two Story Residence: One story  Living Alone: Yes  Support Systems: Family member(s)  Patient Expects to be Discharged to[de-identified] Skilled nursing facility  Current DME Used/Available at Home: None  Critical Behavior:  Neurologic State: Alert  Orientation Level: Oriented X4  Cognition: Appropriate decision making  Safety/Judgement: Fall prevention; Awareness of environment  Psychosocial  Patient Behaviors: Calm; Cooperative  Strength:    Strength: Within functional limits (BLE's)  Tone & Sensation:   Tone: Normal  Sensation: Impaired (notes minimal tingling to right 5th finger tip)  Range Of Motion:  AROM: Generally decreased, functional (limited UE/cervical due to cervical instability; WFL)  PROM: Within functional limits (deferred cervical ROM/UE ROM due to instability)  Functional Mobility:  Bed Mobility:  Supine to Sit: Supervision  Sit to Supine: Modified independent  Transfers:  Sit to Stand: Contact guard assistance  Stand to Sit: Supervision  Balance:   Sitting: Intact  Standing: Impaired  Standing - Static: Fair  Standing - Dynamic : Fair  Ambulation/Gait Training:  Distance (ft): 8 Feet (ft)  Assistive Device: Walker, rolling  Ambulation - Level of Assistance: Supervision  Gait Description (WDL): Exceptions to WDL  Gait Abnormalities: Decreased step clearance  Base of Support: Widened  Speed/Nichole: Slow;Shuffled  Step Length: Left shortened;Right shortened  Therapeutic Exercises:   QS bilaterally x3 reps each. Pt educated on GS, QS, AP x 10 reps each 3 x/day to tolerance for HEP  Pain:  Pain Scale 1: Numeric (0 - 10)  Pain Intensity 1: 0  Pain Location 1: Neck  Pain Orientation 1: Anterior  Pain Description 1: Burning  Pain Intervention(s) 1: Medication (see MAR)  Activity Tolerance:   Poor+  Please refer to the flowsheet for vital signs taken during this treatment.   After treatment:   [ ]         Patient left in no apparent distress sitting up in chair  [X]         Patient left in no apparent distress in bed  [X]         Call bell left within reach  [ ]         Nursing notified  [ ]         Caregiver present  [ ]         Bed alarm activated      COMMUNICATION/EDUCATION:   [X]         Fall prevention education was provided and the patient/caregiver indicated understanding. [X]         Patient/family have participated as able in goal setting and plan of care. [X]         Patient/family agree to work toward stated goals and plan of care. [ ]         Patient understands intent and goals of therapy, but is neutral about his/her participation. [ ]         Patient is unable to participate in goal setting and plan of care. Thank you for this referral.  Sheba Hurtado, PT   Time Calculation: 20 mins

## 2017-01-30 NOTE — ROUTINE PROCESS
Bedside and Verbal shift change report given to Edith Singh (oncoming nurse) by Lion Pacheco RN (offgoing nurse). Report included the following information SBAR, Kardex, MAR and Recent Results. SITUATION:    Code Status: DNR  Reason for Admission: Cervical spine instability [M53.2X2]  Cervical spinal cord compression ([facility] [G95.20]   Cancer, metastatic to bone [facility]) [C79.51]    [facility] day: 15   Problem List:       Hospital Problems  Date Reviewed: 1/19/2017          Codes Class Noted POA    Debility ICD-10-CM: R53.81  ICD-9-CM: 799.3  1/23/2017 Unknown        Pulmonary embolism ([facility] [address] ICD-10-CM: I26.99  ICD-9-CM: 415.19  1/23/2017 Unknown        Malignant neoplasm of upper-outer quadrant of left female breast ([facility] [address] ICD-10-CM: C50.412  ICD-9-CM: 174.4  1/19/2017 Yes        Bone metastasis ([facility] [address] ICD-10-CM: C79.51  ICD-9-CM: 198.5  1/19/2017 Yes        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 Patient taking anticoagulants no     ASSESSMENT:    Changes in Assessment Throughout Shift: n/a     Patient has Central Line: no Reasons if yes: n/a   Patient has Negro Cath: no Reasons if yes: n/a      Last Vitals:     Vitals:    01/28/17 1923 01/29/17 0854 01/29/17 1623 01/29/17 2009   BP:  130/64 131/69 139/63   Pulse: 85 88 94 95   Resp: 19 20 20 20   Temp:  98.1 °F (36.7 °C) 98.8 °F (37.1 °C) 98.3 °F (36.8 °C)   SpO2: 95% 95% 96% 97%   Weight:       Height:            IV and DRAINS (will only show if present)   [REMOVED] Peripheral IV 01/19/17 Left; Inner Arm-Site Assessment: Clean, dry, & intact  [REMOVED] Peripheral IV 01/19/17 Left Hand-Site Assessment: Clean, dry, & intact  [REMOVED] Peripheral IV 01/19/17 Right Arm-Site Assessment: Clean, dry, & intact  [REMOVED] Peripheral IV 01/20/17 Left Forearm-Site Assessment: Clean, dry, & intact  [REMOVED] Peripheral IV 01/21/17 Left Wrist-Site Assessment: Clean, dry, & intact  [REMOVED] Peripheral IV 01/22/17 Left Hand-Site Assessment: Clean, dry, & intact  [REMOVED] Peripheral IV 01/22/17 Right Wrist-Site Assessment: Other (Comment) (drainage and tape)  [REMOVED] Peripheral IV 01/17/17 Left Antecubital-Site Assessment: Clean, dry, & intact     WOUND (if present)   Wound Type:  breast   Dressing present Dressing Present : Yes   Wound Concerns/Notes:  Infection/ cancer     PAIN    Pain Assessment    Pain Intensity 1: 0 (01/30/17 0419)    Pain Location 1: Incisional, Neck    Pain Intervention(s) 1: Medication (see MAR)    Patient Stated Pain Goal: 0  o Interventions for Pain:  See mar  o Intervention effective: yes  o Time of last intervention: see mar   o Reassessment Completed: yes      Last 3 Weights:  Last 3 Recorded Weights in this Encounter    01/17/17 1713 01/22/17 2000   Weight: 90.7 kg (200 lb) 86.9 kg (191 lb 9.3 oz)     Weight change:      INTAKE/OUPUT    Current Shift: 01/29 1901 - 01/30 0700  In: 1080 [P.O.:1080]  Out: 750 [Urine:750]    Last three shifts: 01/28 0701 - 01/29 1900  In: 1162 [P.O.:960; I.V.:7975]  Out: 1500 [Urine:1500]     LAB RESULTS     Recent Labs      01/29/17   1020  01/28/17   0947  01/27/17   1929  01/27/17   0910   WBC   --    --    --   13.7*   HGB  10.4*  10.7*  10.6*  10.7*   HCT  30.7*  32.2*  31.7*  32.3*   PLT   --    --    --   416        Recent Labs      01/29/17   1020  01/27/17   0910   NA   --   137   K   --   4.1   GLU   --   135*   BUN   --   24*   CREA   --   0.74   CA   --   9.2   INR  1.1  1.1       RECOMMENDATIONS AND DISCHARGE PLANNING     1.  Pending tests/procedures/ Plan of Care or Other Needs: talk to hospice     2. Discharge plan for patient and Needs/Barriers: TBD    3. Estimated Discharge Date: TBD Posted on Whiteboard in Patients Room: yes      4. The patient's care plan was reviewed with the oncoming nurse. \"HEALS\" SAFETY CHECK      Fall Risk    Total Score: 3    Safety Measures: Safety Measures: Bed/Chair-Wheels locked, Bed in low position, Caregiver at bedside, Call light within reach    A safety check occurred in the patient's room between off going nurse and oncoming nurse listed above. The safety check included the below items  Area Items   H  High Alert Medications - Verify all high alert medication drips (heparin, PCA, etc.)   E  Equipment - Suction is set up for ALL patients (with madhav)  - Red plugs utilized for all equipment (IV pumps, etc.)  - WOWs wiped down at end of shift.  - Room stocked with oxygen, suction, and other unit-specific supplies   A  Alarms - Bed alarm is set for fall risk patients  - Ensure chair alarm is in place and activated if patient is up in a chair   L  Lines - Check IV for any infiltration  - Negro bag is empty if patient has a Negro   - Tubing and IV bags are labeled   S  Safety   - Room is clean, patient is clean, and equipment is clean. - Hallways are clear from equipment besides carts. - Fall bracelet on for fall risk patients  - Ensure room is clear and free of clutter  - Suction is set up for ALL patients (with madhav)  - Hallways are clear from equipment besides carts.    - Isolation precautions followed, supplies available outside room, sign posted     iLnda Diaz RN

## 2017-01-30 NOTE — PROGRESS NOTES
initiated follow up Palliative visit with patient who welcomed 's visit and stated that she is feeling \"up\" today and added that she thinks getting \"uninterrupted sleep\" has helped her \"quiet\" her mind through the \"chaos\" of her recent medical experiences.  offered patient a prayer blanket hand made and blessed by [facility]; patient received it with gratitude.  offered listening support as patient shared how she believes that her nicolette has increased and has helped her through these last couple of weeks. \"God has been preparing me for this. \" Patient stated that currently she is not part of a Buddhist family; she added that she was a member of a [employer] in the past, saying \"I got God and that's all that matters. And God just keeps bringing me what I need. \"    Lili Mejia will continue to follow up for support; patient expressed her gratitude for visit, support and prayer blanket.     Sandy Kaur, Palliative

## 2017-01-30 NOTE — DIABETES MGMT
Glycemic Control Plan of Care    Assessment/Recommendations:  Patient is 68year old with past medical history including chronic fatigue syndrome, ovarian cyst, pulmonary embolism on chronic coumadin - was admitted on 01/17/2017 with c/o neck pain described as progressive and worsening. No history of diabetes mellitus. Noted:  Carcinoma left breast. Status post excision and biopsy left breast mass on 1/17/2017. Multiple nodule bilateral lung, bone mets, lesion C2/3/4/5, and axillary lymp suggestive of mets. Debility. Pulmonary embolism. Palliative care team consulted. Pending arrangement for transition of care to SNF. Oral Decadron 4 mg every 12 hours. POC BG range on 01/29/2017: 123-201 mg/dL. POC BG report on 01/30/2017 at time of review: 120, 120, 119 mg/dL. Patient received a total of 16 mg of Dexamethasone on 01/29/2017. Recommendation: continue monitoring and correctional insulin. Most recent blood glucose values:    Results for Casey Barnes (MRN [id number]) as of 1/30/2017 17:02   Ref. Range 1/29/2017 05:38 1/29/2017 11:39 1/29/2017 20:53   GLUCOSE,FAST - POC Latest Ref Range: 70 - 110 mg/dL 197 (H) 123 (H) 201 (H)     Results for Casey Barnes (MRN [id number]) as of 1/30/2017 17:02   Ref. Range 1/30/2017 05:05 1/30/2017 11:51 1/30/2017 16:45   GLUCOSE,FAST - POC Latest Ref Range: 70 - 110 mg/dL 120 (H) 120 (H) 119 (H)     Current A1C: None. No history of diabetes mellitus. Pending transition of care for patient with metastatic cancer. Current hospital diabetes medications:  Correctional lispro insulin 4x daily (before meals and bedtime). Normal sensitivity dose.     Total daily dose insulin requirement previous day: 01/209/2017  Lispro: 9 units    Home diabetes medications:    Diet: Regular gluten free    Goals:  Blood glucose will be within target range of  mg/dL by 02/02/2017    Education:  ___  Refer to Diabetes Education Record             _X__  Education not indicated at this time      Ingrid Salomon RN

## 2017-01-30 NOTE — PROGRESS NOTES
Spoke with pt and she has decided on going to a SNF. Gave SNF list to pt. Her cousins are going to look at facilities today and will get back with me. CM to follow.

## 2017-01-30 NOTE — PROGRESS NOTES
Occupational Therapy Note: orders received, chart reviewed and this patient is currently on complete bedrest. Please update activity perimeters in order for this patient to participate in and benefit from the most thorough OT evaluation and treatment. This is per departmental protocol.  Thank-you for this referral. Lobito Verdin OTR/L

## 2017-01-31 NOTE — PROGRESS NOTES
NUTRITION    Nutrition Consult: General Nutrition Management & Supplements,      RECOMMENDATIONS / PLAN:     - Continue with current nutrition interventions  - Continue RD inpatient monitoring and evaluation     NUTRITION INTERVENTIONS & DIAGNOSIS:     [x] Meals/Snacks: modified diet  [x] Medical food supplementation:  Ensure Enlive once daily   [x] Vitamin and mineral supplementation: thera-m with iron     Nutrition Diagnosis:  Inadequate energy intake related to decreased appetite as evidenced by poor po intake x 1 week PTA    ASSESSMENT:     Subjective:  Patient reported appetite and po intake are improving. Has good meal intake. Consuming supplements  Current Appetite:   [x] Good     [] Fair     [] Poor     [] Other:  Appetite/meal intake prior to admission:   [] Good     [] Fair     [x] Poor (x 1 week PTA)     [] Other:    Diet: DIET NUTRITIONAL SUPPLEMENTS  DIET REGULAR Gluten Free     Average po intake adequate to meet patients estimated nutritional needs:   [x] Yes (meals and supplements)     [] No   [] Unable to determine at this time  Current Meal Intake:   Patient Vitals for the past 100 hrs:   % Diet Eaten   01/27/17 1010 100 %      Food Allergies:  None known (possible intolerance to gluten)  Feeding Limitations:  [] Swallowing difficulty    [] Chewing difficulty    [x] Other: pt reported sometimes has soreness on side of neck while eating, but stated is tolerating meals    BM:  1/29  Skin Integrity: abscess on left breast; redness to the sacrum  Edema:  None   Pertinent Medications: Reviewed     Labs:  No results for input(s): NA, K, CL, CO2, GLU, BUN, CREA, CA, MG, PHOS, ALB, PREALB, TBIL, SGOT, ALT in the last 72 hours.     Intake/Output Summary (Last 24 hours) at 01/31/17 1324  Last data filed at 01/31/17 1226   Gross per 24 hour   Intake                0 ml   Output             2550 ml   Net            -2550 ml       Anthropometrics:  Ht Readings from Last 1 Encounters:   01/17/17 5' 3\" (1.6 m) Last 3 Recorded Weights in this Encounter    01/17/17 1713 01/22/17 2000   Weight: 90.7 kg (200 lb) 86.9 kg (191 lb 9.3 oz)     Body mass index is 33.94 kg/(m^2). Weight History:  Patient stated usual body weight is 190 lbs    Weight Metrics 1/22/2017 1/16/2017   Weight 191 lb 9.3 oz 200 lb   BMI 33.94 kg/m2 34.33 kg/m2        Admitting Diagnosis: Cervical spine instability [M53.2X2]  Cervical spinal cord compression ([facility]) [G95.20]  Cancer, metastatic to bone ([facility]) [C79.51]  No past medical history on file. Education Needs:        [x] None identified  [] Identified - Not appropriate at this time  []  Identified and addressed - refer to education log  Learning Limitations:   [x] None identified  [] Identified    Cultural, Jewish & ethnic food preferences:  [x] None identified    [] Identified and addressed     ESTIMATED NUTRITION NEEDS:     Calories: 7021-7797 kcal (MSJx1.2-1.3) based on  [x] Actual BW:  9 kg    [] IBW:   Protein: 73-91 gm (0.8-1 gm/kg) based on  [x] Actual BW:  91 kg    [] IBW:   Fluid: 1 mL/kcal     MONITORING & EVALUATION:     Nutrition Goal(s):  1. Po intake of meals will meet >75% of patient estimated nutritional needs within the next 5-7 days.   Outcome:  [x] Met/Ongoing    []  Not Met/ progressing    [] New/Initial Goal     Monitoring:   [x] Monitor meal intake    [] Monitor diet tolerance     [x] Monitor supplement intake    Previous Recommendations (for follow-up assessments only):     []   Implemented       []   Not Implemented (RD to address)     [x] No Recommendation Made     Discharge Planning:  Regular diet; gluten free per patient preference/ due to possible intolerance  [x] Participated in care planning, discharge planning, & interdisciplinary rounds as appropriate      Eyal Kinney, [address] [place] [address]   Pager: [phone]

## 2017-01-31 NOTE — PROGRESS NOTES
Patient  Alert and oriented denies pain and shows no signs of distress. Patient assisted to bed side commode and back in bed. Call bell in place, bed in low position and locked .

## 2017-01-31 NOTE — PROGRESS NOTES
OT orders received and chart reviewed. Pt not seen for skilled OT due to:  []  Nausea/vomiting  []  Eating  []  Pain  []  Pt lethargic  [x]  Patient refused, reporting that there is too much happening today and is overwhelming; reported to come back tomorrow. Will f/u later as patients' schedule allows. Thank you.   Rachel Rodríguez OTR/GLENDY

## 2017-01-31 NOTE — PROGRESS NOTES
Bedside, Verbal and Written shift change report given to Yokasta Webster (oncoming nurse) by Good Webb RN  (offgoing nurse). Report included the following information SBAR, Kardex and MAR.

## 2017-01-31 NOTE — PROGRESS NOTES
SUBJECTIVE:    Pt feeling ok, pain well controlled. OBJECTIVE:    Visit Vitals    /68 (BP 1 Location: Right arm, BP Patient Position: At rest;Supine)    Pulse 74    Temp 97.5 °F (36.4 °C)    Resp 16    Ht 5' 3\" (1.6 m)    Wt 86.9 kg (191 lb 9.3 oz)    SpO2 94%    Breastfeeding No    BMI 33.94 kg/m2     General:  Awake, alert  Cardiovascular:  S1S2+, RRR  Pulmonary:  Coarse BS b/l  GI:  Soft, BS+, NT, ND  Extremities:  No edema         ASSESSMENT:    1. Osteolytic lesion C2 to C4 with pathologic fracture C3 with moderate cord compression C2-C4. 2. Metastatic left Breast mass including bone, lungs, adrenal.  3. Acute recurrent PE  4. Leg pain, no DVT - BETTER  5. Elevated AST, improving. 6. Low grade fever, resolved  7. Moderate protein malnutrition   8. Leukocytosis sec to steroid, better  9.  Acute respiratory distress due to PE    PLAN:     Transfer to nh once agreed on by family and cm  Will start coumadin and cont lovenox sq  Pain control  Decadron  D/w pt in detail  D/w taurus [facility] [place] in detail over phone  Both wants to cont current care, no escalation   DNR    CMP:   No results found for: NA, K, CL, CO2, AGAP, GLU, BUN, CREA, GFRAA, GFRNA, CA, MG, PHOS, ALB, TBIL, TP, ALB, GLOB, AGRAT, SGOT, ALT, GPT  CBC:   Lab Results   Component Value Date/Time    WBC 12.5 01/31/2017 09:50 AM    HGB 11.1 (L) 01/31/2017 09:50 AM    HCT 33.4 (L) 01/31/2017 09:50 AM     01/31/2017 09:50 AM     Aakash Petersen MD

## 2017-01-31 NOTE — PROGRESS NOTES
Pt and pt's cousins have decided on a SNF. FOC given and signed by pt and she chose [facility] with Hospice. [facility] into e-[facility]charge. CM to follow.

## 2017-01-31 NOTE — PROGRESS NOTES
Second attempt at PT, pt declined stating she has a lot of things to take care of and cant focus on therapy. Agreeable to PT tomorrow am. Will continue to follow.  Sierra Null, PTA

## 2017-01-31 NOTE — PROGRESS NOTES
New PT orders seen and acknowledged. Pt evaluated 1/30/17, and currently on PT caseload.  Thank you for your referral.    [place], PTA

## 2017-01-31 NOTE — PROGRESS NOTES
Attempted to see patient, but she was asleep. Didn't want to woke patient. Allowed her to rest. Palliative will continue to follow for support. Thank you for the opportunity to assist in the care of Ms. Piedad Hunter.   Phil Roberts, MSW  Palliative Medicine

## 2017-02-01 NOTE — DISCHARGE SUMMARY
Transfer Summary    Patient: Gabby Last MRN: [id number]  CSN: [id number]    YOB: 1940  Age: 68 y.o. Sex: female    DOA: 1/17/2017 LOS:  LOS: 15 days   Discharge Date:      Admission Diagnoses: Cervical spine instability [M53.2X2]  Cervical spinal cord compression ([address] [G95.20]  Cancer, metastatic to bone [facility]) [C79.51]    Discharge Diagnoses:  Please see Dictation. Discharge Condition: Stable    PHYSICAL EXAM  Visit Vitals    /75 (BP 1 Location: Left arm, BP Patient Position: At rest)    Pulse 80    Temp 97.8 °F (36.6 °C)    Resp 18    Ht 5' 3\" (1.6 m)    Wt 86.9 kg (191 lb 9.3 oz)    SpO2 99%    Breastfeeding No    BMI 33.94 kg/m2       General: Alert, cooperative, no acute distress    Lungs:  CTA Bilaterally. Heart:             Regular rate and Rhythm. Abdomen: Soft, Non distended, Non tender. + Bowel sounds. Extremities: No edema or cyanosis   Neurologic:  AA, oriented X 3. Moves all ext                                Hospital Course: Please see dictation. code # [id number]. Discharge Medications:     Current Discharge Medication List      START taking these medications    Details   dexamethasone (DECADRON) 4 mg tablet Take 1 Tab by mouth two (2) times daily (with meals). Qty: 30 Tab, Refills: 0      docusate sodium (COLACE) 100 mg capsule Take 1 Cap by mouth two (2) times a day for 90 days. Qty: 60 Cap, Refills: 2      enoxaparin (LOVENOX) injection 130 mg by SubCUTAneous route every twenty-four (24) hours. Qty: 10 Syringe, Refills: 0      famotidine (PEPCID) 20 mg tablet Take 1 Tab by mouth two (2) times a day. Qty: 60 Tab, Refills: 0      insulin lispro (HUMALOG) 100 unit/mL injection SubCUTAneous route Before breakfast, lunch, dinner and at bedtime.    For Blood Sugar (mg/dL) of:     Less than 150 =   0 units           150 -199 =   2 units  200 -249 =   4 units  250 -299 =   6 units  300 -349 =   8 units  350 and above =  10 units  Qty: 1 Vial, Refills: 0 multivitamin, tx-iron-ca-min (THERA-M W/ IRON) 9 mg iron-400 mcg tab tablet Take 1 Tab by mouth daily. Qty: 30 Tab, Refills: 0      oxyCODONE-acetaminophen (PERCOCET) 5-325 mg per tablet Take 1-2 Tabs by mouth every four (4) hours as needed. Max Daily Amount: 12 Tabs. Qty: 10 Tab, Refills: 0      warfarin (COUMADIN) 7.5 mg tablet Take 1 Tab by mouth every evening for 5 days. Qty: 5 Tab, Refills: 0      LORazepam (ATIVAN) 0.5 mg tablet Take 1 Tab by mouth every six (6) hours as needed. Max Daily Amount: 2 mg. Qty: 15 Tab, Refills: 0         CONTINUE these medications which have CHANGED    Details   cyclobenzaprine (FLEXERIL) 5 mg tablet Take 2 Tabs by mouth three (3) times daily as needed for Muscle Spasm(s). Qty: 15 Tab, Refills: 0         STOP taking these medications       ibuprofen (MOTRIN) 800 mg tablet Comments:   Reason for Stopping:               DIET:  Diabetic Diet    ACTIVITY: Activity as tolerated  Patient needs to be on Fall, aspiration, decubitus precaution. ·    PT/OT consult  ·             Speech consult     ADDITIONAL INFORMATION: If you experience any of the following symptoms but not limited to Fever, chills, nausea, vomiting, diarrhea, change in mentation, falling, bleeding, shortness of breath, chest pain, please call your primary care physician or return to the emergency room if you cannot get hold of your doctor:     FOLLOW UP CARE:  Follow-up with 1. Physician at SNF in 1-2 days with INR on 2/3/17 and coumadin dose.                                 Pt's PCP: Familia Yoon MD.    Minutes spent on discharge: >40 minutes spent coordinating this discharge (review instructions/follow-up, prescriptions, preparing report for sign off)    Jose Perez MD  2/1/2017 11:58 AM

## 2017-02-01 NOTE — PROGRESS NOTES
Bedside and Verbal shift change report given to Solo Arreguin RN (oncoming nurse) by Robson Mcallister (offgoing nurse). Report included the following information SBAR, Kardex, MAR and Recent Results. SITUATION:    Code Status: DNR   Reason for Admission: Cervical spine instability [M53.2X2]   Cervical spinal cord compression (HCC) [G95.20]   Cancer, metastatic to bone [facility]) [C79.51]    [facility] day: 14   Problem List:       Hospital Problems  Date Reviewed: 1/19/2017          Codes Class Noted POA    Debility ICD-10-CM: R53.81  ICD-9-CM: 799.3  1/23/2017 Unknown        Pulmonary embolism ([facility] [address] ICD-10-CM: I26.99  ICD-9-CM: 415.19  1/23/2017 Unknown        Malignant neoplasm of upper-outer quadrant of left female breast ([facility] [address] ICD-10-CM: C50.412  ICD-9-CM: 174.4  1/19/2017 Yes        Bone metastasis ([facility] [address] ICD-10-CM: C79.51  ICD-9-CM: 198.5  1/19/2017 Yes        Spinal cord compression due to malignant neoplasm metastatic to spine [facility]) ICD-10-CM: G95.20, C79.51  ICD-9-CM: 336.9, 198.5  1/19/2017 Yes        Malignant neoplasm metastatic to right lung [facility]) ICD-10-CM: C78.01  ICD-9-CM: 197.0  1/19/2017 Yes        Weakness of both arms ICD-10-CM: M62.81  ICD-9-CM: 729.89  1/19/2017 Yes        Neck mass ICD-10-CM: R22.1  ICD-9-CM: 784.2  1/17/2017 Yes              BACKGROUND:    Past Medical History: No past medical history on file. Patient taking anticoagulants yes     ASSESSMENT:    Changes in Assessment Throughout Shift: no     Patient has Central Line: no Reasons if yes: .  Patient has Negro Cath: no Reasons if yes: .  Last Vitals:     Vitals:    01/30/17 1600 01/30/17 2012 01/31/17 0823 01/31/17 1639   BP: 133/72 141/76 127/68 125/63   Pulse: 69 87 74 86   Resp: 18 16 16 16   Temp: 98.3 °F (36.8 °C) 97.4 °F (36.3 °C) 97.5 °F (36.4 °C) 98.4 °F (36.9 °C)   SpO2: 95% 95% 94% 96%   Weight:       Height:            IV and DRAINS (will only show if present)   [REMOVED] Peripheral IV 01/19/17 Left; [address] Arm-Site Assessment: Clean, dry, & intact  [REMOVED] Peripheral IV 01/19/17 Left Hand-Site Assessment: Clean, dry, & intact  [REMOVED] Peripheral IV 01/19/17 Right Arm-Site Assessment: Clean, dry, & intact  [REMOVED] Peripheral IV 01/20/17 Left Forearm-Site Assessment: Clean, dry, & intact  [REMOVED] Peripheral IV 01/21/17 Left Wrist-Site Assessment: Clean, dry, & intact  [REMOVED] Peripheral IV 01/22/17 Left Hand-Site Assessment: Clean, dry, & intact  [REMOVED] Peripheral IV 01/22/17 Right Wrist-Site Assessment: Other (Comment) (drainage and tape)  [REMOVED] Peripheral IV 01/17/17 Left Antecubital-Site Assessment: Clean, dry, & intact     WOUND (if present)   Wound Type:  none   Dressing present Dressing Present : Yes   Wound Concerns/Notes:  none     PAIN    Pain Assessment    Pain Intensity 1: 0 (01/30/17 1010)    Pain Location 1: Neck    Pain Intervention(s) 1: Medication (see MAR)    Patient Stated Pain Goal: 0  o Interventions for Pain:  See MAR  o Intervention effective: yes  o Time of last intervention: See MAR   o Reassessment Completed: yes      Last 3 Weights:  Last 3 Recorded Weights in this Encounter    01/17/17 1713 01/22/17 2000   Weight: 90.7 kg (200 lb) 86.9 kg (191 lb 9.3 oz)     Weight change:      INTAKE/OUPUT    Current Shift:      Last three shifts: 01/30 0701 - 01/31 1900  In: -   Out: 2550 [Urine:2550]     LAB RESULTS     Recent Labs      01/31/17   0950  01/30/17   1945  01/30/17   0940   WBC  12.5   --    --    HGB  11.1*  11.6*  10.8*   HCT  33.4*  34.6*  32.6*   PLT  320   --    --         Recent Labs      01/31/17   0950  01/30/17   0940  01/29/17   1020   INR  1.1  1.0  1.1       RECOMMENDATIONS AND DISCHARGE PLANNING     1. Pending tests/procedures/ Plan of Care or Other Needs: none     2. Discharge plan for patient and Needs/Barriers: hospice    3. Estimated Discharge Date: TBD Posted on Whiteboard in Patients Room: yes      4.  The patient's care plan was reviewed with the oncoming nurse. \"HEALS\" SAFETY CHECK      Fall Risk    Total Score: 3    Safety Measures: Safety Measures: Bed/Chair-Wheels locked, Bed in low position, Call light within reach    A safety check occurred in the patient's room between off going nurse and oncoming nurse listed above. The safety check included the below items  Area Items   H  High Alert Medications - Verify all high alert medication drips (heparin, PCA, etc.)   E  Equipment - Suction is set up for ALL patients (with madhav)  - Red plugs utilized for all equipment (IV pumps, etc.)  - WOWs wiped down at end of shift.  - Room stocked with oxygen, suction, and other unit-specific supplies   A  Alarms - Bed alarm is set for fall risk patients  - Ensure chair alarm is in place and activated if patient is up in a chair   L  Lines - Check IV for any infiltration  - Negro bag is empty if patient has a Negro   - Tubing and IV bags are labeled   S  Safety   - Room is clean, patient is clean, and equipment is clean. - Hallways are clear from equipment besides carts. - Fall bracelet on for fall risk patients  - Ensure room is clear and free of clutter  - Suction is set up for ALL patients (with madhav)  - Hallways are clear from equipment besides carts.    - Isolation precautions followed, supplies available outside room, sign posted     Reji Gutiérrez

## 2017-02-01 NOTE — DISCHARGE SUMMARY
[facility]  TRANSFER SUMMARY    Name:  Rocio Russell  MR#:  [id number]  :  1940  Account #:  [de-identified]  Date of Adm:  2017  Date of Transfer:      PRIMARY CARE PHYSICIAN: Cindy Pelletier MD    DISPOSITION: Transfer to skilled nursing home with hospice care. TRANSFER CONDITION: Stable. TRANSFER DIAGNOSES  1. Stage IV left breast cancer with metastases in bones, lungs, and  adrenal gland. 2. Osteolytic lesion C2 to C4 with pathological fracture C3 with  moderate cord compression C2 to C4.  3. Abnormal liver function tests secondary to liver metastases. 4. Acute recurrent pulmonary embolism. 5. Moderate protein calorie malnutrition. 6. Leukocytosis secondary to steroids. 7. Acute respiratory distress, present on admission secondary to  pulmonary embolism, improved now. 8. Chronic pain secondary to metastasis. 9. DO NOT RESUSCITATE. DISCHARGE MEDICATIONS  1. Decadron 4 mg b.i.d.  2. Colace 100 mg b.i.d.  3. Lovenox 130 mg subcutaneous every 24 hours, to be stopped once  her INR is more than 2.0.  4. Pepcid 20 mg b.i.d.  5. Humalog sliding scale insulin coverage. 6. Ativan 0.5 mg every 6 hours p.r.n. for agitation. 7. Multivitamin 1 tablet daily. 8. Percocet 1 tablet every 4 hours p.r.n.  9. Coumadin 7.5 mg daily, to dose to keep INR between 2.0 to 3.0. 10. Flexeril 10 mg 3 times daily p.r.n. CONSULTATIONS  1. In-hospital consultation with Palliative Care with Colt Rodriguez. 2. Consultation with Dr. Jolly Oh, Pulmonary. 3. Consultation with  Hospice. 4. Consultation with Dr. Lawanda Gan, Hematology/Oncology. 5. Consultation with Dr. Chito Barker, Interventional Radiology. 6. Consultation with Dr. Maximilian Baez, Surgery. 7. Consultation with Dr. Pino Troy, Orthopedic Surgery. [address]  1.  The patient underwent a left breast biopsy which confirmed poorly  differentiated infiltrating ductal cancer with associated skin ulceration  and necrosis. 2. The patient also had a CT chest, abdomen and pelvis in the hospital  which showed a large left breast mass, bulky left axillary  lymphadenopathy, wide-spread pulmonary metastatic disease,  possible left adrenal metastasis. 3. CT head: No acute intracranial abnormality. 4. The patient also had a CTA chest which showed small of PE with  multiple subsegmental vessels, both lower lobes, small pleural  effusion, pulmonary osseous nodule, and left adrenal metastasis  noted. 5. Lower extremity PVL's negative for DVT. 6. Bone scan showed highly suspicious for multifocal osseous  metastatic disease. 7. MRI cervical spine showed multiple levels in the upper cervical spine  significantly aggressive process involving C2, C3, C4, C5,  associated prevertebral soft tissue enhancing edema and dorsal  peridural enhancing soft tissue thickening, moderate cord compression  at C2, C4, compression fracture deformity C3 with retropulsion,  suggesting pathological fracture. HOSPITAL COURSE: This is a 69-year-old  female who  comes to the emergency room with complaining of neck pain. The  patient in the emergency room had MRI CT spine which showed  compression fracture and also cord compression at C2 to C4 levels. Dr. Nicky Burt was consulted, Spine Surgery, and the patient was admitted  to the hospital. The patient also had a CT abdomen and pelvis which  showed left breast mass along with possible metastases. The patient  was admitted with impression of possibility of breast cancer, and IR  was consulted for breast biopsy, and Oncology and Palliative Care  were also consulted. Oncology saw the patient and agreed with the  breast biopsy. Breast biopsy was obtained. Breast biopsy eventually  came back metastatic ductal breast cancer.  Spine Surgery initially saw  the patient and thought that the patient would need a surgical  evaluation, but once the breast mass was noted, they recommended  depending on the patient's long-term prognosis, they will further decide  on the surgery. The patient's breast biopsy came back with metastatic  cancer. Spine Surgery discussed with the patient and family and the  patient and family decided no aggressive surgeries, they do not want  to do any escalation of surgery or any escalation of care. They were  considering about palliative care. Palliative Care was also consulted. Oncology was also consulted. Oncology saw the patient and thought  that the patient would need only palliative chemo or would recommend  hospice. Palliative Care discussed with the patient and family and the  patient decided not to do any aggressive treatment and wanted to go  with hospice care only. Family decided they wanted to place her to a  nursing home. PT was consulted and recommended SNF placement. The patient and family have decided they want to go with SNF with  hospice care. The patient has currently been on steroids and in view of  her recurrent PE, she has been also started on Lovenox and  Coumadin. Her INR is currently supratherapeutic, so will be continued  on Lovenox along with Coumadin. Once INR is therapeutic, the  Lovenox can be discontinued. The patient is currently hemodynamically and medically stable for  transfer to skilled nursing home with hospice. The patient is alert, awake, oriented x3. I discussed with the patient  and also with her niece, Karyna Garza, yesterday in detail about her discharge  plan and followup appointments. Also, discussed with them about the  metastatic disease and poor prognosis. They all completely  understand and agree with my plan. I also discussed with them about  continuing Coumadin. They are in agreement. I have discussed with  the patient about possibility of going to SNF today and she is in  agreement with my plan. I also answered all her questions and  concerns at the bedside appropriately.         JOSEPH PATTERSON, MD Vincent Chen / Dimas.Bhaskar  D:  02/01/2017   12:08  T:  02/01/2017   12:56  Job #:  [id number]

## 2017-02-01 NOTE — PROGRESS NOTES
Problem: Self Care Deficits Care Plan (Adult)  Goal: *Therapy Goal (Edit Goal, Insert Text)  Occupational Therapy Goals  Initiated 2/1/2017 within 7 day(s). 1. Patient will perform upper body dressing with supervision/set-up. 2. Patient will perform lower body dressing with minimal assistance/contact guard assist, AE prn.  3. Patient will perform toilet transfers with supervision/set-up. 4. Patient will perform all aspects of toileting with supervision/set-up. Outcome: Progressing Towards Goal  OCCUPATIONAL THERAPY EVALUATION     Patient: Juani Garrett (6 y.o. female)  Date: 2/1/2017  Primary Diagnosis: Cervical spine instability [M53.2X2]  Cervical spinal cord compression (HCC) [G95.20]  Cancer, metastatic to bone (HCC) [C79.51]  Procedure(s) (LRB):  OCCIPITAL CERVICAL FUSION C1-C6 C-ARM/NUVASIVE (N/A)     Precautions:   Fall, soft cervical collar when OOB      ASSESSMENT :  Based on the objective data described below, the patient presents with decreased ADLs, decreased functional mobility and muscle weakness/poor endurance, complicated by metastatic cancer in her bones. Patient initially resistive to OOB activity but able to stand at edge of bed with encouragement. She uses the [facility] for toileting and was sponge bathing at home secondary to fear of falling. Extra time needed for all tasks but patient motivated to increase her strength and independence. Patient will benefit from skilled intervention to address the above impairments.   Patients rehabilitation potential is considered to be Good  Factors which may influence rehabilitation potential include:   [ ]             None noted  [ ]             Mental ability/status  [X]             Medical condition  [ ]             Home/family situation and support systems  [ ]             Safety awareness  [ ]             Pain tolerance/management  [ ]             Other:        PLAN :  Recommendations and Planned Interventions:  [X]               Self Care Training                  [X]        Therapeutic Activities  [X]               Functional Mobility Training    [ ]        Cognitive Retraining  [X]               Therapeutic Exercises           [X]        Endurance Activities  [X]               Balance Training                   [ ]        Neuromuscular Re-Education  [ ]               Visual/Perceptual Training     [X]   Home Safety Training  [X]               Patient Education                 [X]        Family Training/Education  [ ]               Other (comment):     Frequency/Duration: Patient will be followed by occupational therapy 1-2 times per day/4-7 days per week to address goals. Discharge Recommendations: Harmeet Hirsch  Further Equipment Recommendations for Discharge: N/A       PATIENT COMPLEXITY      Eval Complexity: History: MEDIUM Complexity : Expanded review of history including physical, cognitive and psychosocial  history ; Examination: MEDIUM Complexity : 3-5 performance deficits relating to physical, cognitive , or psychosocial skils that result in activity limitations and / or participation restrictions; Decision Making:MEDIUM Complexity : Patient may present with comorbidities that affect occupational performnce. Miniml to moderate modification of tasks or assistance (eg, physical or verbal ) with assesment(s) is necessary to enable patient to complete evaluation  Assessment: Moderate Complexity       G-CODES:      Self Care  Current  CK= 40-59%   Goal  CJ= 20-39%. The severity rating is based on the Level of Assistance required for Functional Mobility and ADLs. SUBJECTIVE:   Patient stated Jose Francisco Augustin was having a hard time for the past couple of months.       OBJECTIVE DATA SUMMARY:   No past medical history on file. No past surgical history on file. Prior Level of Function/Home Situation: Pt was independent with basic self care tasks and functional mobility PTA.   Home Situation  Home Environment: Private residence  # Steps to Enter: 3  Rails to Enter: Yes  Hand Rails : Bilateral  One/Two Story Residence: One story  Living Alone: Yes  Support Systems: Family member(s)  Patient Expects to be Discharged to[de-identified] Skilled nursing facility  Current DME Used/Available at Home: None  Tub or Shower Type:  (Patient sponge bathes at home.)  [X]  Right hand dominant          [ ]  Left hand dominant  Cognitive/Behavioral Status:  Neurologic State: Alert  Orientation Level: Oriented X4  Cognition: Appropriate decision making; Follows commands  Safety/Judgement: Awareness of environment; Fall prevention  Skin: Intact on UEs  Edema: None noted in UEs  Vision/Perceptual:    Acuity: Within Defined Limits    Corrective Lenses: Glasses  Coordination:  Fine Motor Skills-Upper: Left Intact; Right Intact    Gross Motor Skills-Upper: Left Intact; Right Intact  Balance:  Sitting: Intact  Standing: Impaired  Strength:  Strength: Generally decreased, functional (UEs; 4/5 except for shoulders NT 2/2 cervical spine issues)  Tone & Sensation:  Tone: Normal (UEs)  Sensation: Intact (UEs)  Range of Motion:  AROM: Within functional limits (UEs)  PROM: Within functional limits (UEs)  Functional Mobility and Transfers for ADLs:  Bed Mobility:  Supine to Sit: Minimum assistance  Sit to Supine: Supervision  Transfers:  Sit to Stand: Contact guard assistance              Toilet Transfer : Contact guard assistance  ADL Assessment:  Feeding: Setup     Oral Facial Hygiene/Grooming: Setup     Bathing: Moderate assistance     Upper Body Dressing: Minimum assistance     Lower Body Dressing: Moderate assistance     Toileting: Minimum assistance     Pain:  Patient noted 5/10 pain in her neck pre and post session. Pain meds given prior to session and patient resting in bed at end of session. Activity Tolerance:   Good  Please refer to the flowsheet for vital signs taken during this treatment.   After treatment:   [ ] Patient left in no apparent distress sitting up in chair  [X] Patient left in no apparent distress in bed  [X] Call bell left within reach  [ ] Nursing notified  [ ] Caregiver present  [ ] Bed alarm activated      COMMUNICATION/EDUCATION:   [X] Home safety education was provided and the patient/caregiver indicated understanding. [X] Patient/family have participated as able in goal setting and plan of care. [X] Patient/family agree to work toward stated goals and plan of care. [ ] Patient understands intent and goals of therapy, but is neutral about his/her participation. [ ] Patient is unable to participate in goal setting and plan of care.      Thank you for this referral.  Angie Castro MS OTR/L  Time Calculation: 25 mins

## 2017-02-01 NOTE — PROGRESS NOTES
Bedside, Verbal and Written shift change report given to [address] [place] [address] (oncoming nurse) by Hannah Berger RN   (offgoing nurse). Report included the following information SBAR, Kardex and MAR.

## 2017-02-01 NOTE — PROGRESS NOTES
Care Management Interventions  PCP Verified by CM: Yes (DR. Darrel Wright)  Palliative Care Consult (Criteria: CHF and RRAT>21): No  Reason for No Palliative Care Consult: Other (see comment) (NO ORDER)  Mode of Transport at Discharge: Other (see comment) (FRIEND)  Transition of Care Consult (CM Consult): Discharge Planning  Current Support Network: Family Lives Nearby, Lives Alone, Own Home  Confirm Follow Up Transport: Friends  Plan discussed with Pt/Family/Caregiver: Yes (PT [facility])  [place] of Choice Offered: Yes (PT IS TRYING TO DECIDE AT THIS TIME)     Pt is being discharged to [facility] with hospice. CM contacted [facility] hospice and hospice RN have met with pt and family members in room. Pt's transfer  has been scheduled for 1630 with [facility] transport. Pt, pt's family, nurse, physician, facility and agency are aware.

## 2017-02-01 NOTE — PROGRESS NOTES
Patient  Alert and resting. C,o being hot. Pt is afebrile, room temp lowered. Door left open per pt request. Given cold compress for face. Pt denies pain and shows no signs of distress. Call bell in place, bed in low position and locked .

## 2017-02-01 NOTE — HOSPICE
[facility] Chart review in progress. Please contact [phone] with any questions or concerns regarding this patient. [facility] TC to pt taurus/RISA Vasquez, miah hospice referral. Meeting time set up with taurus for 1230-1 at bedside to discuss hospice services. Met with pt, along with pt niece and cousin at the bedside. Discussed hospice services, hospice goals/plans of care, support provided including interdisciplinary team, equipment, supplies, medications and 24/7 on call availability. Pt is alert and oriented, and verbalized agreement with hospice services, as does pt family at the bedside. Equipment has been ordered for stat delivery including hospital bed w/gel overlay mattress, overbed table, nebulizer, oxygen concentrator, bedside commode. Delivery to be coordinated with [facility] ([phone]), [phone].

## 2017-02-01 NOTE — PROGRESS NOTES
[facility] HEMATOLOGY ONCOLOGY       Assessment/ Plan:     Patient Active Problem List   Diagnosis Code    Neck mass R22.1    Malignant neoplasm of upper-outer quadrant of left female breast ([facility] [address] C50.412    Bone metastasis ([facility] [address] C79.51    Spinal cord compression due to malignant neoplasm metastatic to spine (HCC) G95.20, C79.51    Malignant neoplasm metastatic to right lung (HCC) C78.01    Weakness of both arms M62.81    Debility R53.81    Pulmonary embolism (HCC) I26.99     Saw earlier in the day, she is slowly deteriorating  Hospice would be best option, palliative care following, set up to go on hospice snf soon   Will sign off, call if needed     Thank you for the opportunity to participate in the care, please do not hesitate to call for any questions or concerns. I have discussed the diagnosis with the patient and the intended plan. The patient verbalized understanding and agrees with the plan.       History:   Roro Davis is a 68 y.o. female presenting today for Headache  She is found to have spinal cord compression with metastatic breast cancer    Current Facility-Administered Medications   Medication Dose Route Frequency Provider Last Rate Last Dose    warfarin (COUMADIN) tablet 7.5 mg  7.5 mg Oral QPM Susanne Elliott MD   7.5 mg at 01/31/17 1734    oxyCODONE-acetaminophen (PERCOCET) 5-325 mg per tablet 1-2 Tab  1-2 Tab Oral Q4H PRN Susanne Elliott MD   2 Tab at 02/01/17 0601    LORazepam (ATIVAN) tablet 0.5 mg  0.5 mg Oral Q6H PRN Susanne Elliott MD        WARFARIN INFORMATION NOTE (COUMADIN)   Other Q24H Susanne Elliott MD        dexamethasone (DECADRON) tablet 4 mg  4 mg Oral Q12H Lucy Fox MD   4 mg at 01/31/17 2002    enoxaparin (LOVENOX) injection 130 mg  130 mg SubCUTAneous Q24H Lucy Fox MD   130 mg at 01/31/17 1446    albuterol-ipratropium (DUO-NEB) 2.5 MG-0.5 MG/3 ML  3 mL Nebulization Q4H PRN Mingo Zapata MD        insulin lispro (HUMALOG) injection   SubCUTAneous AC&HS Iman Umana PA-C   2 Units at 01/31/17 2217    dextrose (D50W) injection syrg 12.5-25 g  25-50 mL IntraVENous PRN Iman Umana PA-C        glucose chewable tablet 16 g  4 Tab Oral PRN Iman Umana PA-C        glucagon [facility]) injection 1 mg  1 mg IntraMUSCular PRN Iman Umana PA-C        sodium chloride (NS) flush 5-10 mL  5-10 mL IntraVENous PRN Myrna Chapman MD        pregabalin (LYRICA) capsule 50 mg  50 mg Oral ON CALL TO OR Myrna Chapman MD   Stopped at 01/23/17 0700    0.9% sodium chloride infusion 250 mL  250 mL IntraVENous PRN Kaveh Sauceda PA-C        calcium acetate-aluminum sulf (DOMEBORO) 5 Packet, sodium chloride irrigation 0.9 % 500 mL   Topical BID Mingo Zapata MD        ondansetron [facility]) injection 4 mg  4 mg IntraVENous Q4H PRN Mingo Zapata MD        influenza vaccine 9522-81 (36mos+)(PF) (FLUZONE/FLUARIX/FLULAVAL QUAD) injection 0.5 mL  0.5 mL IntraMUSCular PRIOR TO DISCHARGE Mingo Zapata MD        acetaminophen (TYLENOL) tablet 500 mg  500 mg Oral Q6H PRN Lavon Chua MD        famotidine (PEPCID) tablet 20 mg  20 mg Oral BID Lavon Chua MD   20 mg at 01/31/17 1734    docusate sodium (COLACE) capsule 100 mg  100 mg Oral BID Lavon Chua MD   100 mg at 01/31/17 1734    multivitamin, tx-iron-ca-min (THERA-M w/ IRON) tablet 1 Tab  1 Tab Oral DAILY Lavon Chua MD   1 Tab at 01/31/17 1023       Objective:   VS:    Visit Vitals    /73    Pulse 78    Temp 97.7 °F (36.5 °C)    Resp 18    Ht 5' 3\" (1.6 m)    Wt 86.9 kg (191 lb 9.3 oz)    SpO2 97%    Breastfeeding No    BMI 33.94 kg/m2        Physical Exam:     Constitutional:  Stated age, no acute distress and alert and oriented x 3    HENT:  Oral mucosa pink and moist, no cyanosis observed and no pallor observed.    Eyes:  conjunctiva normal, upper and lower eyelid normal bilaterally.    Neck:  No jugular venous distension present.    Cardiovascular:  heart sounds normal, normal rate and regular rhythm, no murmurs or rubs, no thrills, no S3 or S4 palpable,and no palpable opening snaps.  Point of maximal impulse normal. Carotid arteries normal. BP in 2+ extremities not indicated.    Pulmonary/Chest Wall:  breath sounds are coarse bilaterally and no use of accessory muscles in breathing.                         Left breast mass and axillary nodes are almost fixed to chest wall           Recent Results (from the past 168 hour(s))   GLUCOSE, POC    Collection Time: 01/25/17 12:13 PM   Result Value Ref Range    Glucose (POC) 99 70 - 110 mg/dL   HGB & HCT    Collection Time: 01/25/17  1:48 PM   Result Value Ref Range    HGB 9.9 (L) 12.0 - 16.0 g/dL    HCT 30.2 (L) 35.0 - 45.0 %   PTT    Collection Time: 01/25/17  1:48 PM   Result Value Ref Range    aPTT 48.8 (H) 23.0 - 36.4 SEC   GLUCOSE, POC    Collection Time: 01/25/17  5:08 PM   Result Value Ref Range    Glucose (POC) 125 (H) 70 - 110 mg/dL   PTT    Collection Time: 01/25/17  8:00 PM   Result Value Ref Range    aPTT 110.6 (H) 23.0 - 36.4 SEC   HGB & HCT    Collection Time: 01/25/17  8:00 PM   Result Value Ref Range    HGB 9.7 (L) 12.0 - 16.0 g/dL    HCT 29.6 (L) 35.0 - 45.0 %   GLUCOSE, POC    Collection Time: 01/25/17  9:18 PM   Result Value Ref Range    Glucose (POC) 147 (H) 70 - 110 mg/dL   PROTHROMBIN TIME + INR    Collection Time: 01/26/17  3:42 AM   Result Value Ref Range    Prothrombin time 14.8 11.5 - 15.2 sec    INR 1.2 0.8 - 1.2     PTT    Collection Time: 01/26/17  3:42 AM   Result Value Ref Range    aPTT >180.0 (HH) 23.0 - 36.4 SEC   CBC WITH AUTOMATED DIFF    Collection Time: 01/26/17  3:42 AM   Result Value Ref Range    WBC 11.2 4.6 - 13.2 K/uL    RBC 3.43 (L) 4.20 - 5.30 M/uL    HGB 9.3 (L) 12.0 - 16.0 g/dL    HCT 28.3 (L) 35.0 - 45.0 %    MCV 82.5 74.0 - 97.0 FL    MCH 27.1 24.0 - 34.0 PG    MCHC 32.9 31.0 - 37.0 g/dL RDW 15.0 (H) 11.6 - 14.5 %    PLATELET 643 540 - 345 K/uL    MPV 9.3 9.2 - 11.8 FL    NEUTROPHILS 86 (H) 40 - 73 %    LYMPHOCYTES 6 (L) 21 - 52 %    MONOCYTES 8 3 - 10 %    EOSINOPHILS 0 0 - 5 %    BASOPHILS 0 0 - 2 %    ABS. NEUTROPHILS 9.6 (H) 1.8 - 8.0 K/UL    ABS. LYMPHOCYTES 0.7 (L) 0.9 - 3.6 K/UL    ABS. MONOCYTES 0.9 0.05 - 1.2 K/UL    ABS. EOSINOPHILS 0.0 0.0 - 0.4 K/UL    ABS.  BASOPHILS 0.0 0.0 - 0.1 K/UL    DF AUTOMATED     METABOLIC PANEL, BASIC    Collection Time: 01/26/17  3:42 AM   Result Value Ref Range    Sodium 139 136 - 145 mmol/L    Potassium 4.1 3.5 - 5.5 mmol/L    Chloride 100 100 - 108 mmol/L    CO2 31 21 - 32 mmol/L    Anion gap 8 3.0 - 18 mmol/L    Glucose 144 (H) 74 - 99 mg/dL    BUN 23 (H) 7.0 - 18 MG/DL    Creatinine 0.73 0.6 - 1.3 MG/DL    BUN/Creatinine ratio 32 (H) 12 - 20      GFR est AA >60 >60 ml/min/1.73m2    GFR est non-AA >60 >60 ml/min/1.73m2    Calcium 8.6 8.5 - 10.1 MG/DL   GLUCOSE, POC    Collection Time: 01/26/17  5:35 AM   Result Value Ref Range    Glucose (POC) 145 (H) 70 - 110 mg/dL   HGB & HCT    Collection Time: 01/26/17  9:16 AM   Result Value Ref Range    HGB 10.3 (L) 12.0 - 16.0 g/dL    HCT 31.9 (L) 35.0 - 45.0 %   PTT    Collection Time: 01/26/17  9:16 AM   Result Value Ref Range    aPTT 76.2 (H) 23.0 - 36.4 SEC   GLUCOSE, POC    Collection Time: 01/26/17 11:20 AM   Result Value Ref Range    Glucose (POC) 147 (H) 70 - 110 mg/dL   PTT    Collection Time: 01/26/17  2:45 PM   Result Value Ref Range    aPTT 48.1 (H) 23.0 - 36.4 SEC   GLUCOSE, POC    Collection Time: 01/26/17  3:33 PM   Result Value Ref Range    Glucose (POC) 172 (H) 70 - 110 mg/dL   HGB & HCT    Collection Time: 01/26/17  8:20 PM   Result Value Ref Range    HGB 9.9 (L) 12.0 - 16.0 g/dL    HCT 30.0 (L) 35.0 - 45.0 %   PTT    Collection Time: 01/26/17  8:20 PM   Result Value Ref Range    aPTT 31.1 23.0 - 36.4 SEC   GLUCOSE, POC    Collection Time: 01/26/17 10:16 PM   Result Value Ref Range    Glucose (POC) 167 (H) 70 - 110 mg/dL   GLUCOSE, POC    Collection Time: 01/27/17  6:10 AM   Result Value Ref Range    Glucose (POC) 154 (H) 70 - 110 mg/dL   PROTHROMBIN TIME + INR    Collection Time: 01/27/17  9:10 AM   Result Value Ref Range    Prothrombin time 13.4 11.5 - 15.2 sec    INR 1.1 0.8 - 1.2     PTT    Collection Time: 01/27/17  9:10 AM   Result Value Ref Range    aPTT 26.2 23.0 - 36.4 SEC   CBC WITH AUTOMATED DIFF    Collection Time: 01/27/17  9:10 AM   Result Value Ref Range    WBC 13.7 (H) 4.6 - 13.2 K/uL    RBC 3.89 (L) 4.20 - 5.30 M/uL    HGB 10.7 (L) 12.0 - 16.0 g/dL    HCT 32.3 (L) 35.0 - 45.0 %    MCV 83.0 74.0 - 97.0 FL    MCH 27.5 24.0 - 34.0 PG    MCHC 33.1 31.0 - 37.0 g/dL    RDW 15.7 (H) 11.6 - 14.5 %    PLATELET 218 447 - 940 K/uL    MPV 9.9 9.2 - 11.8 FL    NEUTROPHILS 84 (H) 40 - 73 %    LYMPHOCYTES 6 (L) 21 - 52 %    MONOCYTES 10 3 - 10 %    EOSINOPHILS 0 0 - 5 %    BASOPHILS 0 0 - 2 %    ABS. NEUTROPHILS 11.6 (H) 1.8 - 8.0 K/UL    ABS. LYMPHOCYTES 0.8 (L) 0.9 - 3.6 K/UL    ABS. MONOCYTES 1.3 (H) 0.05 - 1.2 K/UL    ABS. EOSINOPHILS 0.0 0.0 - 0.4 K/UL    ABS.  BASOPHILS 0.0 0.0 - 0.1 K/UL    DF AUTOMATED     METABOLIC PANEL, BASIC    Collection Time: 01/27/17  9:10 AM   Result Value Ref Range    Sodium 137 136 - 145 mmol/L    Potassium 4.1 3.5 - 5.5 mmol/L    Chloride 99 (L) 100 - 108 mmol/L    CO2 32 21 - 32 mmol/L    Anion gap 6 3.0 - 18 mmol/L    Glucose 135 (H) 74 - 99 mg/dL    BUN 24 (H) 7.0 - 18 MG/DL    Creatinine 0.74 0.6 - 1.3 MG/DL    BUN/Creatinine ratio 32 (H) 12 - 20      GFR est AA >60 >60 ml/min/1.73m2    GFR est non-AA >60 >60 ml/min/1.73m2    Calcium 9.2 8.5 - 10.1 MG/DL   GLUCOSE, POC    Collection Time: 01/27/17 11:38 AM   Result Value Ref Range    Glucose (POC) 175 (H) 70 - 110 mg/dL   GLUCOSE, POC    Collection Time: 01/27/17  4:57 PM   Result Value Ref Range    Glucose (POC) 196 (H) 70 - 110 mg/dL   HGB & HCT    Collection Time: 01/27/17  7:29 PM   Result Value Ref Range    HGB 10.6 (L) 12.0 - 16.0 g/dL    HCT 31.7 (L) 35.0 - 45.0 %   GLUCOSE, POC    Collection Time: 01/27/17 10:51 PM   Result Value Ref Range    Glucose (POC) 122 (H) 70 - 110 mg/dL   HGB & HCT    Collection Time: 01/28/17  9:47 AM   Result Value Ref Range    HGB 10.7 (L) 12.0 - 16.0 g/dL    HCT 32.2 (L) 35.0 - 45.0 %   GLUCOSE, POC    Collection Time: 01/28/17 11:23 AM   Result Value Ref Range    Glucose (POC) 158 (H) 70 - 110 mg/dL   GLUCOSE, POC    Collection Time: 01/28/17  5:00 PM   Result Value Ref Range    Glucose (POC) 143 (H) 70 - 110 mg/dL   GLUCOSE, POC    Collection Time: 01/28/17  9:26 PM   Result Value Ref Range    Glucose (POC) 194 (H) 70 - 110 mg/dL   GLUCOSE, POC    Collection Time: 01/29/17  5:38 AM   Result Value Ref Range    Glucose (POC) 197 (H) 70 - 110 mg/dL   HGB & HCT    Collection Time: 01/29/17 10:20 AM   Result Value Ref Range    HGB 10.4 (L) 12.0 - 16.0 g/dL    HCT 30.7 (L) 35.0 - 45.0 %   PROTHROMBIN TIME + INR    Collection Time: 01/29/17 10:20 AM   Result Value Ref Range    Prothrombin time 14.2 11.5 - 15.2 sec    INR 1.1 0.8 - 1.2     PTT    Collection Time: 01/29/17 10:20 AM   Result Value Ref Range    aPTT 26.4 23.0 - 36.4 SEC   GLUCOSE, POC    Collection Time: 01/29/17 11:39 AM   Result Value Ref Range    Glucose (POC) 123 (H) 70 - 110 mg/dL   PTT    Collection Time: 01/29/17  3:45 PM   Result Value Ref Range    aPTT 32.3 23.0 - 36.4 SEC   GLUCOSE, POC    Collection Time: 01/29/17  8:53 PM   Result Value Ref Range    Glucose (POC) 201 (H) 70 - 110 mg/dL   GLUCOSE, POC    Collection Time: 01/30/17  5:05 AM   Result Value Ref Range    Glucose (POC) 120 (H) 70 - 110 mg/dL   HGB & HCT    Collection Time: 01/30/17  9:40 AM   Result Value Ref Range    HGB 10.8 (L) 12.0 - 16.0 g/dL    HCT 32.6 (L) 35.0 - 45.0 %   PROTHROMBIN TIME + INR    Collection Time: 01/30/17  9:40 AM   Result Value Ref Range    Prothrombin time 12.9 11.5 - 15.2 sec    INR 1.0 0.8 - 1.2     PTT    Collection Time: 01/30/17 9:40 AM   Result Value Ref Range    aPTT 25.3 23.0 - 36.4 SEC   GLUCOSE, POC    Collection Time: 01/30/17 11:51 AM   Result Value Ref Range    Glucose (POC) 120 (H) 70 - 110 mg/dL   GLUCOSE, POC    Collection Time: 01/30/17  4:45 PM   Result Value Ref Range    Glucose (POC) 119 (H) 70 - 110 mg/dL   HGB & HCT    Collection Time: 01/30/17  7:45 PM   Result Value Ref Range    HGB 11.6 (L) 12.0 - 16.0 g/dL    HCT 34.6 (L) 35.0 - 45.0 %   GLUCOSE, POC    Collection Time: 01/30/17  9:04 PM   Result Value Ref Range    Glucose (POC) 168 (H) 70 - 110 mg/dL   GLUCOSE, POC    Collection Time: 01/31/17  6:45 AM   Result Value Ref Range    Glucose (POC) 157 (H) 70 - 110 mg/dL   PROTHROMBIN TIME + INR    Collection Time: 01/31/17  9:50 AM   Result Value Ref Range    Prothrombin time 13.6 11.5 - 15.2 sec    INR 1.1 0.8 - 1.2     PTT    Collection Time: 01/31/17  9:50 AM   Result Value Ref Range    aPTT 26.6 23.0 - 36.4 SEC   CBC W/O DIFF    Collection Time: 01/31/17  9:50 AM   Result Value Ref Range    WBC 12.5 4.6 - 13.2 K/uL    RBC 3.98 (L) 4.20 - 5.30 M/uL    HGB 11.1 (L) 12.0 - 16.0 g/dL    HCT 33.4 (L) 35.0 - 45.0 %    MCV 83.9 74.0 - 97.0 FL    MCH 27.9 24.0 - 34.0 PG    MCHC 33.2 31.0 - 37.0 g/dL    RDW 17.6 (H) 11.6 - 14.5 %    PLATELET 141 998 - 664 K/uL    MPV 10.2 9.2 - 11.8 FL   GLUCOSE, POC    Collection Time: 01/31/17 12:06 PM   Result Value Ref Range    Glucose (POC) 130 (H) 70 - 110 mg/dL   GLUCOSE, POC    Collection Time: 01/31/17  3:04 PM   Result Value Ref Range    Glucose (POC) 176 (H) 70 - 110 mg/dL   HGB & HCT    Collection Time: 01/31/17  8:25 PM   Result Value Ref Range    HGB 10.8 (L) 12.0 - 16.0 g/dL    HCT 32.6 (L) 35.0 - 45.0 %   GLUCOSE, POC    Collection Time: 01/31/17  9:49 PM   Result Value Ref Range    Glucose (POC) 169 (H) 70 - 110 mg/dL   PROTHROMBIN TIME + INR    Collection Time: 02/01/17  3:44 AM   Result Value Ref Range    Prothrombin time 13.5 11.5 - 15.2 sec    INR 1.1 0.8 - 1.2     GLUCOSE, POC    Collection Time: 02/01/17  6:03 AM   Result Value Ref Range    Glucose (POC) 128 (H) 70 - 110 mg/dL       No past medical history on file. No past surgical history on file. Social History     Social History    Marital status: UNKNOWN     Spouse name: N/A    Number of children: N/A    Years of education: N/A     Occupational History    Not on file. Social History Main Topics    Smoking status: Not on file    Smokeless tobacco: Not on file    Alcohol use Not on file    Drug use: Not on file    Sexual activity: Not on file     Other Topics Concern    Not on file     Social History Narrative       No family history on file.     Allergies   Allergen Reactions    Morphine Nausea and Vomiting     Facetime 40 minutes this am     Follow-up Disposition: Not on Marlen Cooney MD

## 2017-02-01 NOTE — PROGRESS NOTES
conducted a Follow up consultation and Spiritual Assessment for Anamika Aguilar, who is a 68 y.o.,female. The  provided the following Interventions:  Continued the relationship of care and support. Listened empathically. Offered prayer and assurance of continued prayer on patients behalf. Chart reviewed. The following outcomes were achieved:  Patient expressed gratitude for 's visit. Assessment:  There are no further spiritual or Orthodox issues which require Spiritual Care Services interventions at this time. Plan:  Chaplains will continue to follow and will provide pastoral care on an as needed/requested basis.  recommends bedside caregivers page  on duty if patient shows signs of acute spiritual or emotional distress.        [address] [facility] [address] [phone]

## 2017-02-01 NOTE — PROGRESS NOTES
Pt tolerated dressing change to left breast. Some bloody drainage present after removal of old dressing.  Patient is inquiring if wound will ever heal.

## 2017-05-28 PROBLEM — E86.0 DEHYDRATION: Status: ACTIVE | Noted: 2017-01-01

## 2017-05-28 PROBLEM — A41.9 SEPSIS (HCC): Status: ACTIVE | Noted: 2017-01-01

## 2017-05-28 PROBLEM — N28.9 RENAL INSUFFICIENCY: Status: ACTIVE | Noted: 2017-01-01

## 2017-05-28 PROBLEM — C50.919 BREAST CANCER METASTASIZED TO BONE (HCC): Status: ACTIVE | Noted: 2017-01-01

## 2017-05-28 PROBLEM — C50.919 BREAST CANCER (HCC): Status: ACTIVE | Noted: 2017-01-01

## 2017-05-28 PROBLEM — C79.51 BREAST CANCER METASTASIZED TO BONE (HCC): Status: ACTIVE | Noted: 2017-01-01

## 2017-05-28 PROBLEM — L03.114 CELLULITIS OF ARM, LEFT: Status: ACTIVE | Noted: 2017-01-01

## 2017-05-28 NOTE — ED NOTES
TRANSFER - OUT REPORT:    Verbal report given to Sophia Tripathi RN (name) on Janay Bhardwaj  being transferred to [facility], ICU, room 310 (unit) for routine progression of care       Report consisted of patients Situation, Background, Assessment and   Recommendations(SBAR). Information from the following report(s) SBAR, Kardex, MAR, Recent Results and Cardiac Rhythm Sinus tachycardia. was reviewed with the receiving nurse. Lines:   Peripheral IV 05/28/17 Right Hand (Active)   Site Assessment Clean, dry, & intact 5/28/2017  1:02 AM   Phlebitis Assessment 0 5/28/2017  1:02 AM   Infiltration Assessment 0 5/28/2017  1:02 AM   Dressing Status Clean, dry, & intact 5/28/2017  1:02 AM   Hub Color/Line Status Blue 5/28/2017  1:02 AM        Opportunity for questions and clarification was provided.       Patient transported with:   Monitor  O2 @ 6 liters

## 2017-05-28 NOTE — PROGRESS NOTES
Spoke with pt's Evelin Mckeon Their goal has been hospice and comfort all along. They would like to maximize quality of life, stop IVFluids, labs, medications including abx. DNR and comfort orders entered. Case d/w nursing. Aim to xfer to private medical bed.   Anabell Whitaker MD  5/28/2017  8:19 AM

## 2017-05-28 NOTE — ROUTINE PROCESS
TRANSFER - OUT REPORT:    Verbal report given to LORETTA Muñoz(name) on Lee Lamar  being transferred to (unit) for routine progression of care       Report consisted of patients Situation, Background, Assessment and   Recommendations(SBAR). Information from the following report(s) SBAR, Kardex, ED Summary and MAR was reviewed with the receiving nurse. Lines:   Peripheral IV 05/28/17 Right Hand (Active)   Site Assessment Clean, dry, & intact 5/28/2017  8:00 AM   Phlebitis Assessment 0 5/28/2017  8:00 AM   Infiltration Assessment 0 5/28/2017  8:00 AM   Dressing Status Clean, dry, & intact 5/28/2017  8:00 AM   Dressing Type Transparent 5/28/2017  8:00 AM   Hub Color/Line Status Blue;Flushed 5/28/2017  8:00 AM        Opportunity for questions and clarification was provided.       Patient transported with:   [employer]

## 2017-05-28 NOTE — ROUTINE PROCESS
Received pt lying in bed responsive to pain. Pt has no signs of distress or sob. No  family at bedside at this time.

## 2017-05-28 NOTE — H&P
[facility]  ROUTINE H AND PS    Name:  Iram March  MR#:  [id number]  :  1940  Account #:  [de-identified]  Date of Adm:  2017      Please note, patient cannot provide anything in the way of history. CHIEF COMPLAINT: Fever. HISTORY OF PRESENT ILLNESS: The patient is a 49-year-old  female with a history significant for metastatic breast cancer, acute,  recurrent PE, who was under hospice care at a local nursing facility  when she presented to the emergency department at [place] [address]  with complaints of fever. Apparently she had a T-max of 103. The  patient was apparently unresponsive at that time. While being evaluated in the [place] [address] Emergency Department,  the patient apparently was referred to our service. While being  evaluated, she was noted to have a white count of 17.7. She had a PT/INR of 37.4 over 4.1. She had a urinalysis which was negative for leukocyte esterase and  nitrites. She had a chest x-ray which showed, by report, hypoinflation,  cardiomegaly, and vascular congestion with bilateral lung opacities. She was started on antibiotics over at [place] [address] and has received  vancomycin and Rocephin. She was transferred to our service and was admitted to the ICU as  overflow. I spoke with the patient's power of , Chance Lui, reviewed  goals of care, as well as current and chronic medical issues. She  emphasize that the goal for this patient has been in hospice and  comfort all along. They would like to maximize quality of life. To this  end, IV fluids, lab draws, medications, including antibiotics have all  been stopped. Case was discussed with nursing. REVIEW OF SYSTEMS: Unable to obtain due to the patient's  condition. ALLERGIES: MORPHINE APPARENTLY CAUSES NAUSEA AND  VOMITING. MEDICATIONS: According to last transfer summary, she was on the  following  1.   Decadron 4 mg p.o. b.i.d.  2.  Colace 100 mg p.o. b.i.d.  3. Lovenox. This was to continue until her INR was more than 2.0.  4. She was on Coumadin as well. 5.  Pepcid 20 mg p.o. b.i.d.  6.  Humalog sliding scale. 7.  Ativan 0.5 mg every 6 hours as needed for agitation. 8.  Multivitamin daily. 9.  Percocet every 4 hours as needed. 10. Flexeril 10 mg t.i.d. as needed. PAST MEDICAL HISTORY  1. Metastatic breast cancer, with metastases to both lungs and adrenal  gland. 2. Osteolytic lesion C2 through C4 with pathologic fracture of C3 and  moderate cord compression C2 to C4.  3. Elevated LFTs, due to liver metastases. 4. Acute pulmonary embolism, recurrent. 5. Moderate protein-calorie malnutrition. 6. Leukocytosis due to steroids. 7. Chronic pain. 8. DNR status. PAST SURGICAL HISTORY: No interval surgical history. FAMILY HISTORY: None. SOCIAL HISTORY: None known. PHYSICAL EXAMINATION  VITAL SIGNS: Last set of vitals includes a temperature of 100.7, pulse  122, pressure 97/35, respiratory rate of 27. She is currently on nasal  cannula at 10 liters per minute. GENERAL: When I saw her, she appeared to be ill, she is  unresponsive to verbal and light tactile stimuli. She otherwise appears  to be comfortable. HEENT: Head is normocephalic and atraumatic. Difficult to assess her  extraocular motions, as she is presently not responding to tactile or  verbal stimuli. Mucous membranes appear to be moist. Cannot assess  for midline tongue. NECK: Otherwise supple. There is no lymphadenopathy. CARDIOVASCULAR: Tachycardic, otherwise regular. PULMONARY: She has some coarse rhonchi throughout. No  wheezing. Good air exchange. ABDOMEN: Soft, nontender, nondistended with normal bowel sounds. No masses were felt. EXTREMITIES: There is no calf tenderness that I can see. There is no  apparent edema. I cannot assess for motor strength as she does not  follow commands. NEURO: No facial asymmetry. Otherwise, unresponsive to verbal or  tactile stimuli.     LABORATORY DATA: Include a metabolic panel with a sodium 141,  potassium 4.9, chloride 103, CO2 of 31, BUN of 71, creatinine 1.44,  glucose 169, calcium of 10.4. Liver function tests with a total protein of 5.4, albumin 1.9, total bilirubin  of 1.2. Alkaline phosphatase 173, ALT 73, AST 45. CBC with a white count 17.7, hemoglobin and hematocrit of 14.0 and  44.4. Platelets of 919. Differential 87 segs, 2 bands, 3 lymphs, 2  monocytes, 5 metamyelocytes, 1 myelocyte. PT/INR of 37.4 and 4.1, APTT of 43.0. Urinalysis showed dark yellow, cloudy urine, specific gravity 1.029, pH  of 5.0, negative for nitrites and leukocyte esterase. She had a blood culture and urine cultures drawn. Chest x-ray as above, demonstrating by report, hypoinflation,  cardiomegaly, and vascular congestion with bilateral lung opacities. IMPRESSION  1. A 49-year-old female with a history significant for the above, who is  here with severe sepsis due to pneumonia. Consideration for possible  aspiration pneumonia. 2. Acute metabolic encephalopathy as well. 3. Lactic acidosis. After discussion with the power of , will maintain on comfort  care. Maintain DNR status. Case was discussed with nursing. PLAN  1. We will maintain comfort care, DNR status. I have written for  scopolamine, Ativan, Roxanol, as needed. I have written to transfer her  to a medical bed. I have written for Hospice consultation. 2. In regard to her other medical issues, will not actively treat apart  from maintaining comfort measures as above.         Joe Og MD    PP / TB  D:  05/28/2017   11:06  T:  05/28/2017   12:43  Job #:  [id number]    [facility]

## 2017-05-28 NOTE — ED TRIAGE NOTES
00:50: Patient arrived via [address] who was found at [facility] unresponsive per EMS. Patient presents with heartrate ~ 130, Respiratory rate ~ 30, LUE is erythematous, edematous and hot to touch, oral Temperature of 102.9 degrees F. BLE are both mottled, face and chest are pale. Patient responds to Painful Stimuli      01:30: Spoke with Patients family: Roxana Pérez [phone] or [phone] and Jose Enrique Garcia [phone] who provided Mrs Wynn Crooked advanced medical directives (provided to Dr. Mary Braun). They stated patient developed an infection in her left arm about a week to 10 days ago and she has stage 4 cancer (breast, lung, spine and neck).   I was present with Patient's family spoke to Dr. Mary Braun They Do not want Intubation/CPR/or Tube feeding, they stated they do want the patient to receive IV fluids and IV antibiotics for current situation, Patient responds to painful stimuli and stated her name but otherwise non-verbal.

## 2017-05-28 NOTE — Clinical Note
Status[de-identified] Inpatient [101] Type of Bed: Telemetry [19] Inpatient Hospitalization Certified Necessary for the Following Reasons: 3. Patient receiving treatment that can only be provided in an inpatient setting (further clarification in H&P documentation) Admitting Diagnosis: Sepsis ([facility] [4450893] Admitting Diagnosis: Dehydration [276.51. ICD-9-CM] Admitting Diagnosis: Cellulitis of arm, left [4570573] Admitting Diagnosis: Renal insufficiency [178077] Admitting Diagnosis: Breast cancer ([facility] [596717] Admitting Physician: Butch Frank Attending Physician: Butch Frank Estimated Length of Stay: 3-4 Midnights Discharge Plan[de-identified] Extended Care Facility (e.g. Adult Home, Nursing Home, etc.)

## 2017-05-28 NOTE — ROUTINE PROCESS
Bedside and Verbal shift change report given to [phone]  [phone] Vladimire (oncoming nurse) by Lane Rodriguez RN (offgoing nurse). Report included the following information SBAR, Kardex, MAR and Recent Results. SITUATION:    Code Status: DNR   Reason for Admission: Sepsis ([facility]   Dehydration   Cellulitis of arm, left   Renal insufficiency   Breast cancer ([facility]   Sepsis ([facility]   Renal insufficiency   Dehydration   Breast cancer metastasized to [facility])    [facility] day: 0   Problem List:       Hospital Problems  Date Reviewed: 1/19/2017          Codes Class Noted POA    Dehydration ICD-10-CM: E86.0  ICD-9-CM: 276.51  5/28/2017 Unknown        Breast cancer ([facility] ICD-10-CM: C50.919  ICD-9-CM: 174.9  5/28/2017 Unknown        Renal insufficiency ICD-10-CM: N28.9  ICD-9-CM: 593.9  5/28/2017 Unknown        Sepsis ([facility] ICD-10-CM: A41.9  ICD-9-CM: 038.9, 995.91  5/28/2017 Unknown        Cellulitis of arm, left ICD-10-CM: L03.114  ICD-9-CM: 682.3  5/28/2017 Unknown        Breast cancer metastasized to [facility]) ICD-10-CM: C50.919, C79.51  ICD-9-CM: 174.9, 198.5  5/28/2017 Unknown              BACKGROUND:    Past Medical History: No past medical history on file.       Patient taking anticoagulants no     ASSESSMENT:    Changes in Assessment Throughout Shift: none     Patient has Central Line: no Reasons if yes:    Patient has Negro Cath: no Reasons if yes:       Last Vitals:     Vitals:    05/28/17 1000 05/28/17 1100 05/28/17 1104 05/28/17 1200   BP: (!) 97/35  (!) 88/41 90/45   Pulse: (!) 122 (!) 125 (!) 124 (!) 123   Resp: 27 25 28 15   Temp:    (!) 100.6 °F (38.1 °C)   SpO2: 100%  96% 97%   Weight:       Height:            IV and DRAINS (will only show if present)   Peripheral IV 05/28/17 Right Hand-Site Assessment: Clean, dry, & intact     WOUND (if present)   Wound Type:  none   Dressing present Dressing Present : Yes   Wound Concerns/Notes:  none     PAIN    Pain Assessment                   Patient Stated Pain Goal: Unable to verbalize/indicatate pain  o Interventions for Pain:  none  o Intervention effective: yes  o Time of last intervention: 1500   o Reassessment Completed: yes      Last 3 Weights:  Last 3 Recorded Weights in this Encounter    05/28/17 0050 05/28/17 0800   Weight: 90.7 kg (200 lb) 77.4 kg (170 lb 10.2 oz)     Weight change:      INTAKE/OUPUT    Current Shift: 05/28 0701 - 05/28 1900  In: 460 [I.V.:460]  Out: -     Last three shifts: 05/26 1901 - 05/28 0700  In: 3300 [I.V.:3300]  Out: 150 [Urine:150]     LAB RESULTS     Recent Labs      05/28/17 0103   WBC  17.7*   HGB  14.0   HCT  44.4   PLT  148        Recent Labs      05/28/17 0103   NA  141   K  4.9   GLU  169*   BUN  71*   CREA  1.44*   CA  10.4*   INR  4.1*       RECOMMENDATIONS AND DISCHARGE PLANNING     1. Pending tests/procedures/ Plan of Care or Other Needs:      2. Discharge plan for patient and Needs/Barriers:     3. Estimated Discharge Date:  Posted on Whiteboard in Patients Room: no      4. The patient's care plan was reviewed with the oncoming nurse. \"HEALS\" SAFETY CHECK      Fall Risk    Total Score: 2    Safety Measures: Safety Measures: Bed/Chair-Wheels locked, Bed in low position, Call light within reach, Emergency bedside equipment    A safety check occurred in the patient's room between off going nurse and oncoming nurse listed above.     The safety check included the below items  Area Items   H  High Alert Medications - Verify all high alert medication drips (heparin, PCA, etc.)   E  Equipment - Suction is set up for ALL patients (with yanker)  - Red plugs utilized for all equipment (IV pumps, etc.)  - WOWs wiped down at end of shift.  - Room stocked with oxygen, suction, and other unit-specific supplies   A  Alarms - Bed alarm is set for fall risk patients  - Ensure chair alarm is in place and activated if patient is up in a chair   L  Lines - Check IV for any infiltration  - Negro bag is empty if patient has a Negro - Tubing and IV bags are labeled   S  Safety   - Room is clean, patient is clean, and equipment is clean. - Hallways are clear from equipment besides carts. - Fall bracelet on for fall risk patients  - Ensure room is clear and free of clutter  - Suction is set up for ALL patients (with yanker)  - Hallways are clear from equipment besides carts.    - Isolation precautions followed, supplies available outside room, sign posted     Tariq Mccabe RN

## 2017-05-28 NOTE — ED PROVIDER NOTES
HPI Comments: 12:50 AM Ashley Hyman is a 68 y.o. female under [facility], who presents to the ED via EMS for the evaluation of AMS. Nursing home staff reports that the pt was unresponsive while at the home and febrile (Tmax 103). Hx limited due to mental status change. PCP: No primary care provider on file. The history is provided by the EMS personnel. No past medical history on file. No past surgical history on file. No family history on file. Social History     Social History    Marital status: UNKNOWN     Spouse name: N/A    Number of children: N/A    Years of education: N/A     Occupational History    Not on file. Social History Main Topics    Smoking status: Not on file    Smokeless tobacco: Not on file    Alcohol use Not on file    Drug use: Not on file    Sexual activity: Not on file     Other Topics Concern    Not on file     Social History Narrative         ALLERGIES: Morphine    Review of Systems   Unable to perform ROS: Mental status change       Vitals:    05/28/17 0050   BP: 115/61   Pulse: (!) 133   Resp: 30   Temp: (!) 102.9 °F (39.4 °C)   SpO2: 97%   Weight: 90.7 kg (200 lb)   Height: 5' 3\" (1.6 m)        97% on RA, indicating adequate oxygenation. Physical Exam   Constitutional: No distress. Frail and elderly appearing   HENT:   Head: Normocephalic. Right Ear: External ear normal.   Left Ear: External ear normal.   Mouth/Throat: No oropharyngeal exudate. Eyes: Conjunctivae are normal. Pupils are equal, round, and reactive to light. Right eye exhibits no discharge. Left eye exhibits no discharge. No scleral icterus. Neck: Normal range of motion. Neck supple. No JVD present. No tracheal deviation present. No thyromegaly present. Cardiovascular: Regular rhythm, normal heart sounds and intact distal pulses. Tachycardia present. Exam reveals no gallop and no friction rub. No murmur heard.   Pulmonary/Chest: Effort normal and breath sounds normal. No stridor. Tachypnea noted. No respiratory distress. She has no wheezes. She has no rales. She exhibits no tenderness. Abdominal: Soft. Bowel sounds are normal. She exhibits no distension and no mass. There is no tenderness. There is no rebound and no guarding. Obese     Musculoskeletal: Normal range of motion. She exhibits no edema or tenderness. Lymphadenopathy:     She has no cervical adenopathy. Neurological: She displays normal reflexes. No cranial nerve deficit. She exhibits normal muscle tone. Coordination normal.   Skin: Skin is warm and dry. No rash noted. She is not diaphoretic. No erythema. No pallor. Cellulitic, edematous and erythremic from L arm to finger tips. Tender over volar surface. Nursing note and vitals reviewed. MDM  Number of Diagnoses or Management Options  Breast cancer metastasized to lung, right:   Dehydration:   Sepsis affecting skin:   Diagnosis management comments: Pt advance directive and patient's medical status was discussed with family, Heraclio Rosenberg and Maggie Phelps. They state after review of the advance directive, they want pt to received IV fluids and IV antibiotics to prolong life. They do not want pt to be intubated, CPR started, or dialysis to prolong life.        Amount and/or Complexity of Data Reviewed  Clinical lab tests: ordered and reviewed  Tests in the radiology section of CPT®: ordered and reviewed    Risk of Complications, Morbidity, and/or Mortality  Presenting problems: high  Diagnostic procedures: high  Management options: moderate    Patient Progress  Patient progress: improved    ED Course       Procedures    Medications ordered:   Medications   sodium chloride 0.9 % bolus infusion 3,000 mL (3,000 mL IntraVENous New Bag 5/28/17 0103)   cefTRIAXone (ROCEPHIN) 1 g in 0.9% sodium chloride (MBP/ADV) 50 mL MBP (not administered)   vancomycin (VANCOCIN) 1,000 mg in 0.9% sodium chloride (MBP/ADV) 250 mL adv (not administered) Lab findings:  Recent Results (from the past 12 hour(s))   EKG, 12 LEAD, INITIAL    Collection Time: 05/28/17 12:49 AM   Result Value Ref Range    Ventricular Rate 131 BPM    Atrial Rate 131 BPM    P-R Interval 150 ms    QRS Duration 118 ms    Q-T Interval 288 ms    QTC Calculation (Bezet) 425 ms    Calculated P Axis 61 degrees    Calculated R Axis -27 degrees    Calculated T Axis 138 degrees    Diagnosis       Sinus tachycardia with premature supraventricular complexes  Inferior infarct , age undetermined  Anterolateral infarct , age undetermined  Abnormal ECG  When compared with ECG of 22-JAN-2017 17:55,  premature supraventricular complexes are now present  Left bundle branch block is no longer present  Anterior infarct is now present  Anterolateral infarct is now present  Inferior infarct is now present          EKG interpretation per Nathaly Whitaker MD :  12:50 AM Sinus tachycardia rate of 131. RBBB    Progress notes, Consult notes or additional Procedure notes:   Consult:  Discussed care with Dr. Nicholas Winston discussion; including history of patients chief complaint, available diagnostic results, and treatment course. Agrees to admit the pt. Dispo:  Patient was admitted. Diagnosis:   1. Sepsis affecting skin        Patient's Medications   Start Taking    No medications on file   Continue Taking    ACETAMINOPHEN (TYLENOL) 650 MG SUPPOSITORY    Insert 650 mg into rectum every four (4) hours as needed for Fever. BISACODYL (DULCOLAX) 10 MG SUPPOSITORY    Insert 10 mg into rectum daily as needed (For constipation). CYCLOBENZAPRINE (FLEXERIL) 10 MG TABLET    Take 10 mg by mouth three (3) times daily as needed for Muscle Spasm(s). CYCLOBENZAPRINE (FLEXERIL) 5 MG TABLET    Take 2 Tabs by mouth three (3) times daily as needed for Muscle Spasm(s). DEXAMETHASONE (DECADRON) 4 MG TABLET    Take 1 Tab by mouth two (2) times daily (with meals).     ENOXAPARIN (LOVENOX) INJECTION    130 mg by SubCUTAneous route every twenty-four (24) hours. FAMOTIDINE (PEPCID) 20 MG TABLET    Take 1 Tab by mouth two (2) times a day. GUAIFENESIN-DEXTROMETHORPHAN SR (MUCINEX DM) 600-30 MG PER TABLET    Take 1 Tab by mouth every twelve (12) hours as needed. INSULIN LISPRO (HUMALOG) 100 UNIT/ML INJECTION    SubCUTAneous route Before breakfast, lunch, dinner and at bedtime. For Blood Sugar (mg/dL) of:     Less than 150 =   0 units           150 -199 =   2 units  200 -249 =   4 units  250 -299 =   6 units  300 -349 =   8 units  350 and above =  10 units    LORAZEPAM (ATIVAN) 0.5 MG TABLET    Take 1 Tab by mouth every six (6) hours as needed. Max Daily Amount: 2 mg. MENTHOL-ZINC OXIDE (CALMOSEPTINE) 0.44-20.6 % OINT    Apply 1 Each to affected area three (3) times daily. MULTIVITAMIN, TX-IRON-CA-MIN (THERA-M W/ IRON) 9 MG IRON-400 MCG TAB TABLET    Take 1 Tab by mouth daily. OXYCODONE-ACETAMINOPHEN (PERCOCET) 5-325 MG PER TABLET    Take 1-2 Tabs by mouth every four (4) hours as needed. Max Daily Amount: 12 Tabs. POLYETHYLENE GLYCOL (MIRALAX) 17 GRAM PACKET    Take 17 g by mouth daily. SCOPOLAMINE (TRANSDERM-SCOP) 1.5 MG (1 MG OVER 3 DAYS) PT3D    Administer 1.5 mg in left ear every seventy-two (72) hours. Rotate sites every 72 hours    SODIUM CHLORIDE (SALINE NASAL MIST) 0.65 % NASAL SPRAY    1 [place] by Both Nostrils route daily. These Medications have changed    No medications on file   Stop Taking    No medications on file         SCRIBE ATTESTATION STATEMENT  Documented by: Mark Anthony Ohara. Ritu Moreno for, and in the presence of, Devante Mosquera MD 12:49 AM   Signed by Jamilah Hickman, 5/28/2017 12:49 AM     PROVIDER ATTESTATION STATEMENT  I personally performed the services described in the documentation, reviewed the documentation, as recorded by the scribe in my presence, and it accurately and completely records my words and actions.   Devante Mosquera MD

## 2017-05-29 NOTE — PROGRESS NOTES
responded to the death of  Lee Lamar, who was a 68 y.o.,female,     The  provided the following Interventions:  Provided crisis spiritual support and grief interventions. Cousin Stacie Vasquez and Ms. Tonya Lewis daughter Jerod Valentin were in room. They had spent time with patient today even though they said patient was not responsive. Ms. Dalila Segal and Ms. Megha Elliotty said patient is not  and has no children. Ms. Dalila Segal said they will go to the local court tomorrow and obtain legal rights to proceed with the burial arrangements. Offered assurance of prayers on behalf of the patient. Chart reviewed. Plan:  Chaplains will continue to follow and will provide spiritual care and grief support for the family. Manuel Padgett MDiv.   Board Certified [employer] [phone]

## 2017-05-29 NOTE — PROGRESS NOTES
[facility]  Progress Note    Patient: Sissy Ambriz Age: 68 y.o. : 1940 MR#: [id number] SSN: [id number]  Date/Time: 2017 12:53 PM    Subjective:     Sleeping. Appears comfortable with some mildly increased WOB. Seen with POA and cousin @ bedside, who reiterate goals for comfort care. Assessment/Plan:   1. Comfort care. DNR. 2. Admitting dx includes severe sepsis due to PNA, acute metabolic encephalopathy, lactic acidosis. Additional Notes:      Case discussed with:  []Patient  [x]Family  [x]Nursing  []Case Management  DVT Prophylaxis:  []Lovenox  []Hep SQ  []SCDs  []Coumadin   []On Heparin gtt    Objective:   VS:   Visit Vitals    BP 90/42 (BP 1 Location: Right arm, BP Patient Position: At rest)    Pulse (!) 120    Temp (!) 101.4 °F (38.6 °C)    Resp 16    Ht 5' 3\" (1.6 m)    Wt 77.4 kg (170 lb 10.2 oz)    SpO2 94%    Breastfeeding No    BMI 30.23 kg/m2      Tmax/24hrs: Temp (24hrs), Av.4 °F (38 °C), Min:99 °F (37.2 °C), Max:101.4 °F (38.6 °C)  No intake or output data in the 24 hours ending 17 1253    General:  Sleeping, does not rouse to verbal or light tactile stim. NAD with some mildly increased WOB. Cardiovascular:  Tachy, regular. Pulmonary:  CTA B with tachypnea and decreased BS BLLLF. GI:  Soft, ND, NABS, no apparent TTP. Extremities:  Cool BLE with some mottling. Additional:      Labs:    No results found for this or any previous visit (from the past 24 hour(s)).     Signed By: Comfort Phillips MD     May 29, 2017 12:53 PM

## 2017-05-29 NOTE — DISCHARGE SUMMARY
[address]  [address] SUMMARY    Name:  Tripp Pa  MR#:  [id number]  :  1940  Account #:  [de-identified]  Date of Adm:  2017  Date of Discharge:  2017      TIME OF DEATH: 1439    CAUSE OF DEATH: Severe sepsis due to pneumonia. HOSPITAL COURSE: Please refer to my admission H and P.    Briefly, the patient was a 49-year-old female with a history significant  for metastatic breast cancer, acute recurrent pulmonary embolism,  who was under hospice care at a local nursing facility when she was  brought to the emergency department for fever. She was apparently  unresponsive at that time. She was subsequently admitted to our service, case was discussed  with the patient's power of , who wished the patient to be on  COMFORT CARE only. We maintained her on our service on comfort  measures. Power of  as well as the patient's cousin were  present at bedside during my evaluation today. I was notified by the  nursing staff that the patient had passed away. She was pronounced  by the house officer at the aforementioned date and time.         Rich Yoo MD PP / Tyrell Bean  D:  2017   14:54  T:  2017   15:09  Job #:  [id number]    [facility] [address]

## 2017-05-29 NOTE — PROGRESS NOTES
Death [address] [facility]      Patient lying in bed, mouth open, eyes closed. Pupils fixed and equal bilaterally. No response to verbal or painful stimuli. No heart or lung sounds auscultated. No carotid or peripheral pulses.     Death officially pronounced at [id number], 5/29/2017        Essence Lee MD  PGY-2  [address] [facility]

## 2017-05-29 NOTE — HOSPICE
Hospice nurse in to meet with POA for signing of hospice revocation form as patient is now admitted for aggressive care. POA states that she would be interested in resuming hospice services if patient survives this hospitalization and returns to nursing home. Hospice will stand by for assist with discharge planning as needed. Thank you for the opportunity to assist with this case.

## 2017-05-29 NOTE — ROUTINE PROCESS
Bedside and Verbal shift change report given to Mingo Arango (oncoming nurse) by Mayito Jamison RN   (offgoing nurse). Report included the following information SBAR and ED Summary.

## 2017-05-30 LAB
BACTERIA SPEC CULT: ABNORMAL
BACTERIA SPEC CULT: ABNORMAL
GRAM STN SPEC: ABNORMAL
GRAM STN SPEC: ABNORMAL
SERVICE CMNT-IMP: ABNORMAL
SERVICE CMNT-IMP: ABNORMAL

## 2017-05-31 LAB
BACTERIA SPEC CULT: ABNORMAL
BACTERIA SPEC CULT: ABNORMAL
GRAM STN SPEC: ABNORMAL
SERVICE CMNT-IMP: ABNORMAL

## 2020-09-27 NOTE — ROUTINE PROCESS
Bedside and Verbal shift change report given to Manjinder East (oncoming nurse) by Omid aDs RN (offgoing nurse). Report included the following information SBAR, Kardex, MAR and Recent Results. SITUATION:    Code Status: DNR   Reason for Admission: Neck mass    [place] day: 3   Problem List:       Hospital Problems  Date Reviewed: 1/19/2017          Codes Class Noted POA    Malignant neoplasm of upper-outer quadrant of left female breast ([facility] [address] ICD-10-CM: C50.412  ICD-9-CM: 174.4  1/19/2017 Yes        Bone metastasis ([facility] [address] ICD-10-CM: C79.51  ICD-9-CM: 198.5  1/19/2017 Yes        Spinal cord compression due to malignant neoplasm metastatic to spine [facility]) ICD-10-CM: G95.20, C79.51  ICD-9-CM: 336.9, 198.5  1/19/2017 Yes        Malignant neoplasm metastatic to right lung [facility]) ICD-10-CM: C78.01  ICD-9-CM: 197.0  1/19/2017 Yes        Weakness of both arms ICD-10-CM: M62.81  ICD-9-CM: 729.89  1/19/2017 Yes        Neck mass ICD-10-CM: R22.1  ICD-9-CM: 784.2  1/17/2017 Yes              BACKGROUND:    Past Medical History: No past medical history on file. Patient taking anticoagulants no     ASSESSMENT:    Changes in Assessment Throughout Shift: IV's infiltrated, new IV accessed achieved by charge nurse, Sherrill Fisher Patient has Central Line: no Reasons if yes:    Patient has Negro Cath: no Reasons if yes:       Last Vitals:     Vitals:    01/19/17 2150 01/19/17 2300 01/19/17 2336 01/20/17 0401   BP: 136/76 138/67 138/63 123/58   Pulse: 92 93 93 86   Resp: 18 18 18 18   Temp: 98.7 °F (37.1 °C) 99.9 °F (37.7 °C) 100.1 °F (37.8 °C) 96.9 °F (36.1 °C)   SpO2: 94% 95% 94% 94%   Weight:       Height:            IV and DRAINS (will only show if present)   [REMOVED] Peripheral IV 01/19/17 Left; Inner Arm-Site Assessment: Clean, dry, & intact  [REMOVED] Peripheral IV 01/19/17 Left Hand-Site Assessment: Clean, dry, & intact  [REMOVED] Peripheral IV 01/19/17 Right Arm-Site Assessment: Clean, dry, & intact  [REMOVED] Peripheral IV 01/17/17 Left Antecubital-Site Assessment: Clean, dry, & intact     WOUND (if present)   Wound Type:  Left breast tumor, Cancer   Dressing present Dressing Present : No   Wound Concerns/Notes:  Dressing clean dry intact,      PAIN    Pain Assessment    Pain Intensity 1: 0 (01/20/17 0401)    Pain Location 1: Neck    Pain Intervention(s) 1: Medication (see MAR)    Patient Stated Pain Goal: 0  o Interventions for Pain:  Pain meds prn  o Intervention effective: yes  o Time of last intervention: 2332   o Reassessment Completed: yes      Last 3 Weights:  Last 3 Recorded Weights in this Encounter    01/17/17 1713   Weight: 90.7 kg (200 lb)     Weight change:      INTAKE/OUPUT    Current Shift:      Last three shifts: 01/18 1901 - 01/20 0700  In: 852.9 [P.O.:210]  Out: 1150 [Urine:1150]     LAB RESULTS     Recent Labs      01/20/17   0503  01/19/17   0353  01/17/17   2146   WBC  12.3  11.8  12.8   HGB  9.0*  8.9*  11.1*   HCT  27.5*  27.3*  32.9*   PLT  380  396  502*        Recent Labs      01/20/17   0503  01/19/17   0353  01/18/17   1800  01/18/17   0912   NA  136  134*   --   134*   K  3.8  3.6   --   3.7   GLU  116*  119*   --   87   BUN  13  16   --   21*   CREA  0.67  0.79   --   0.68   CA  9.3  8.9   --   9.5   MG  2.0  1.9   --   1.9   INR  1.4*  2.3*  2.4*   --        RECOMMENDATIONS AND DISCHARGE PLANNING     1. Pending tests/procedures/ Plan of Care or Other Needs: breast biopsy, decisions to be made after biopsy results      Discharge plan for patient and Needs/Barriers: dc home after procedures  2. Estimated Discharge Date: 01/27/17 Posted on Whiteboard in Patients Room: no      4. The patient's care plan was reviewed with the oncoming nurse.        \"HEALS\" SAFETY CHECK      Fall Risk    Total Score: 3    Safety Measures: Safety Measures: Bed/Chair-Wheels locked, Bed in low position, Call light within reach    A safety check occurred in the patient's room between off going nurse and oncoming nurse listed above. The safety check included the below items  Area Items   H  High Alert Medications - Verify all high alert medication drips (heparin, PCA, etc.)   E  Equipment - Suction is set up for ALL patients (with madhav)  - Red plugs utilized for all equipment (IV pumps, etc.)  - WOWs wiped down at end of shift.  - Room stocked with oxygen, suction, and other unit-specific supplies   A  Alarms - Bed alarm is set for fall risk patients  - Ensure chair alarm is in place and activated if patient is up in a chair   L  Lines - Check IV for any infiltration  - Negro bag is empty if patient has a Negro   - Tubing and IV bags are labeled   S  Safety   - Room is clean, patient is clean, and equipment is clean. - Hallways are clear from equipment besides carts. - Fall bracelet on for fall risk patients  - Ensure room is clear and free of clutter  - Suction is set up for ALL patients (with madhav)  - Hallways are clear from equipment besides carts.    - Isolation precautions followed, supplies available outside room, sign posted     Denise Bowie RN Vascular surgery consult called per CHF for prior occlusion of right subclavian.    No evidence of DVT on Venous duplex study
